# Patient Record
Sex: FEMALE | Race: WHITE | NOT HISPANIC OR LATINO | Employment: OTHER | URBAN - METROPOLITAN AREA
[De-identification: names, ages, dates, MRNs, and addresses within clinical notes are randomized per-mention and may not be internally consistent; named-entity substitution may affect disease eponyms.]

---

## 2019-01-11 ENCOUNTER — HOSPITAL ENCOUNTER (OUTPATIENT)
Dept: RADIOLOGY | Facility: MEDICAL CENTER | Age: 59
End: 2019-01-11

## 2019-01-23 ENCOUNTER — HOSPITAL ENCOUNTER (OUTPATIENT)
Dept: RADIOLOGY | Facility: MEDICAL CENTER | Age: 59
End: 2019-01-23
Attending: FAMILY MEDICINE
Payer: COMMERCIAL

## 2019-01-23 DIAGNOSIS — R92.8 ABNORMAL FINDING ON BREAST IMAGING: ICD-10-CM

## 2019-01-23 LAB — PATHOLOGY CONSULT NOTE: NORMAL

## 2019-01-23 PROCEDURE — 88305 TISSUE EXAM BY PATHOLOGIST: CPT

## 2019-01-23 PROCEDURE — 19083 BX BREAST 1ST LESION US IMAG: CPT

## 2019-01-23 PROCEDURE — 88360 TUMOR IMMUNOHISTOCHEM/MANUAL: CPT

## 2019-01-24 ENCOUNTER — TELEPHONE (OUTPATIENT)
Dept: RADIOLOGY | Facility: MEDICAL CENTER | Age: 59
End: 2019-01-24

## 2019-01-28 ENCOUNTER — HOSPITAL ENCOUNTER (OUTPATIENT)
Dept: RADIOLOGY | Facility: MEDICAL CENTER | Age: 59
End: 2019-01-28

## 2019-01-29 ENCOUNTER — TELEPHONE (OUTPATIENT)
Dept: HEMATOLOGY ONCOLOGY | Facility: MEDICAL CENTER | Age: 59
End: 2019-01-29

## 2019-01-29 ENCOUNTER — TELEPHONE (OUTPATIENT)
Dept: RADIOLOGY | Facility: MEDICAL CENTER | Age: 59
End: 2019-01-29

## 2019-01-29 NOTE — TELEPHONE ENCOUNTER
1st attempt to contact the patient- Left voicemail for patient requesting a return call to schedule New Oncology Genetic appointment. (Remind patient to bring a list/ or questionnaire, of her cancer related family history)  NP/[facility]/ Breast Ca/ Jorgito Marmolejo

## 2019-01-30 ENCOUNTER — PATIENT OUTREACH (OUTPATIENT)
Dept: OTHER | Facility: MEDICAL CENTER | Age: 59
End: 2019-01-30

## 2019-01-30 NOTE — LETTER
[facility]   [address]  [place] [address]  Phone: [phone] - Fax: [phone]              Address:  [place] [address]     Date: 01/30/19  Medical Record Number: [id number]    Dear Melanie,      It was nice talking with you today. As we discussed, I am your Cancer Nurse Navigator, a certified oncology nurse. My role is to assess any needs you may have and help with education, guidance and support. I am available to you and your family from diagnosis through your survivorship.       I am available to address your needs during your journey with the following services:     Care Coordination  I can assist you in facilitating communication between your cancer care treatment team to ensure timely treatment and follow-up.  I can also assist with transition of care back to your primary care provider, or other specialist, as needed.  My goal is to bridge gaps for you throughout the course of your active treatment.       Education Services  Understanding the recommended treatment course by your physician is key. I can provide educational resources personalized to your cancer diagnosis to help you understand your diagnosis and treatment. Please let me know if you would like to receive information about your diagnosis and treatment plan.  I am here to help.     Support Services/Resource Information  [facility] we offer a full scope of support services.  I can assist you with referral information to:  · Cancer Clinical Trials & Research  · Nutrition counseling  · Support groups  · Complementary Therapies such as Healing Touch and Mind-Body Techniques Meditation  · Patient Financial Advocates  ·   · [facility], an [employer] affiliate office, our volunteers can assist you with accessing our App Annieing library, support services information, head coverings and comfort items  · Community and national resources,  included eligibility based annamaria assistance and pharmaceutical access programs, if you are in need of additional information.     [facility] offers services that include:  · Behavioral Health  · Genetic counseling & testing  · Acupuncture  · Lymphedema prevention/treatment program  · Palliative care services.       Our care team includes a Survivorship Nurse Navigator, who meets with you after your treatment is completed to review your survivorship care plan and treatment summary.      I hope you have an excellent patient experience.  Please feel free to share with me your comments regarding the care you have received- we value your feedback.    Sincerely,     Hiwot Patiño RN, OCN, [facility]  Cancer Nurse Navigator    Office: [phone]   Direct line: [phone]  Email:  [email or web address]

## 2019-01-31 NOTE — PROGRESS NOTES
"Oncology nurse navigator telephone outreach.   DX: left breast invasive ductal carcinoma   POC: pending. She states she had a surgical consult with Dr. Marmolejo on 1/28/19 and will have an appointment for genetic counseling on 2/4/19.   BARRIERS ASSESSMENT:  TRANSPORTATION: Pt lives in [place]. She states she has a \"good car\" but it is a 5 hour drive each way. She states she can get chemo if needed close to home but is concerned about managing radiation therapy if needed.  FINANCIAL: pt denied barrier at this time  INSURANCE: [employer]   SUPPORT SYSTEM: her , adult children. She states her  and her son will be coming to support her during her appointments.    PSYCHOLOGICAL: pt states she is feeling overwhelmed and scared. She is anxious to find out what the plan of care will ultimately be.   COMMUNICATION: no barrier noted   FAMILY CARE: a new grandchild. Pt states she does not have care responsibilities.  Pt is retired .            INTERVENTION:  Introduced self and role of oncology nurse navigator. Support & services offered. Invited patient to Newly Diagnosed Breast Cancer Orientation Class. Explained how to sign up for classes virtually.      Arranged to meet pt when she is in [place] for her genetics appointment.      Mailed [facility] patient resource guide along with newly diagnosed breast cancer orientation class flyer and support services calendar. Pt will review the information and let me know if interested in any of the programs.    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 After speaking to pt, the ONN called Dr. Rothman's office and informed his MAKalpana.     Provided with ONN contact information and encouraged to call with needs or questions.  Will continue to follow as needed.  "

## 2019-02-01 NOTE — PROGRESS NOTES
"Prechart clinical data and referral information reviewed 2/1  Counseling and testing information prepared     \"I spent 30 minutes 2/1 from 0800 to 0830  reviewing past records, prior to  visit.\"          "

## 2019-02-04 ENCOUNTER — NON-PROVIDER VISIT (OUTPATIENT)
Dept: HEMATOLOGY ONCOLOGY | Facility: MEDICAL CENTER | Age: 59
End: 2019-02-04
Payer: COMMERCIAL

## 2019-02-04 ENCOUNTER — PATIENT OUTREACH (OUTPATIENT)
Dept: OTHER | Facility: MEDICAL CENTER | Age: 59
End: 2019-02-04

## 2019-02-04 ENCOUNTER — OFFICE VISIT (OUTPATIENT)
Dept: HEMATOLOGY ONCOLOGY | Facility: MEDICAL CENTER | Age: 59
End: 2019-02-04
Payer: COMMERCIAL

## 2019-02-04 VITALS
BODY MASS INDEX: 37.73 KG/M2 | HEART RATE: 93 BPM | HEIGHT: 64 IN | SYSTOLIC BLOOD PRESSURE: 122 MMHG | RESPIRATION RATE: 16 BRPM | OXYGEN SATURATION: 94 % | TEMPERATURE: 98.6 F | WEIGHT: 221 LBS | DIASTOLIC BLOOD PRESSURE: 84 MMHG

## 2019-02-04 VITALS
HEART RATE: 93 BPM | RESPIRATION RATE: 16 BRPM | HEIGHT: 64 IN | OXYGEN SATURATION: 94 % | TEMPERATURE: 98.6 F | WEIGHT: 221.12 LBS | BODY MASS INDEX: 37.75 KG/M2 | SYSTOLIC BLOOD PRESSURE: 122 MMHG | DIASTOLIC BLOOD PRESSURE: 84 MMHG

## 2019-02-04 DIAGNOSIS — C50.412 MALIGNANT NEOPLASM OF UPPER-OUTER QUADRANT OF LEFT FEMALE BREAST, UNSPECIFIED ESTROGEN RECEPTOR STATUS (HCC): ICD-10-CM

## 2019-02-04 DIAGNOSIS — Z80.43 FAMILY HISTORY OF TESTICULAR CANCER: ICD-10-CM

## 2019-02-04 DIAGNOSIS — Z80.0 FAMILY HISTORY OF MALIGNANT NEOPLASM OF GASTROINTESTINAL TRACT: ICD-10-CM

## 2019-02-04 DIAGNOSIS — Z15.01 BREAST CANCER GENETIC SUSCEPTIBILITY: ICD-10-CM

## 2019-02-04 DIAGNOSIS — Z80.0 FAMILY HISTORY OF COLON CANCER: ICD-10-CM

## 2019-02-04 DIAGNOSIS — Z80.3 FAMILY HISTORY OF MALIGNANT NEOPLASM OF BREAST: ICD-10-CM

## 2019-02-04 DIAGNOSIS — Z15.01 GENETIC SUSCEPTIBILITY TO MALIGNANT NEOPLASM OF BREAST: ICD-10-CM

## 2019-02-04 DIAGNOSIS — Z80.3 FAMILY HISTORY OF BREAST CANCER: ICD-10-CM

## 2019-02-04 DIAGNOSIS — Z80.43 FAMILY HISTORY OF MALIGNANT NEOPLASM OF TESTIS: ICD-10-CM

## 2019-02-04 PROCEDURE — 99000 SPECIMEN HANDLING OFFICE-LAB: CPT | Performed by: MEDICAL GENETICS

## 2019-02-04 PROCEDURE — 36415 COLL VENOUS BLD VENIPUNCTURE: CPT | Performed by: MEDICAL GENETICS

## 2019-02-04 PROCEDURE — 99358 PROLONG SERVICE W/O CONTACT: CPT | Performed by: MEDICAL GENETICS

## 2019-02-04 PROCEDURE — 99243 OFF/OP CNSLTJ NEW/EST LOW 30: CPT | Mod: 25 | Performed by: MEDICAL GENETICS

## 2019-02-04 RX ORDER — SPIRONOLACTONE 25 MG/1
25 TABLET ORAL EVERY MORNING
COMMUNITY
End: 2020-07-13

## 2019-02-04 ASSESSMENT — PAIN SCALES - GENERAL
PAINLEVEL: NO PAIN
PAINLEVEL: NO PAIN

## 2019-02-04 NOTE — PROGRESS NOTES
Met with pt, her  and her son after Dr. Owen consult. Pt states she has a lot of anxiety about what her treatment plan will be. She has had close friends and family members who were treated for breast cancer and is anxious to get started with some sort of treatment. Currently she states she is waiting for result of genetic test to determine extent of surgery. Pt lives in [place] and states she has reliable transportation but may need to stay in [place] periodically. Provided pt with lodging form. Pt states she has strong emotional support from family and friends. Discussed mind body techniques classes, healing touch, [employer] classes. Provided pt with list of rural counseling resources. Provided pt with list of coping tips and caregiver resources. Brought pt to Resource center and introduced her to staff, showed her what resources are available there.

## 2019-02-04 NOTE — NON-PROVIDER
Melanie Leyva is a 58 y.o. female here for a non-provider visit for: Lab Draws  on 2/4/2019 at 12:35 PM    Procedure Performed: Venipuncture     Anatomical site: Right Antecubital Area (AC)    Equipment used: 21g    Labs drawn: [place]ay    Ordering Provider: Dr. Jorgito Owen    Burt By: Liz Britt, Med Ass't   No complications.  was present the entire time the patients labs were bring drawn.

## 2019-02-04 NOTE — PROGRESS NOTES
GENETIC RISK ASSESSMENT    Melanie Leyva is a 58 y.o. year old patient who was referred by Jatin for cancer genetic risk assessment.    Chief Complaint:   Chief Complaint   Patient presents with   • New Patient      Breast Ca/ Jorgito Marmolejo       HPI: The patient was well until 1 month ago at which time a suspicious physical exam caused the patient to be referred for imaging). A suspicious (mammogram study identified a (left) breast mass. A biopsy  was performed and a specimen was submitted to pathology.     A diagnosis of  carcinoma was made.   The patient's family history is positive for multiple family members (5) with breast cancer (3 < age 50) and testicular and colon cancer  Review of personal and family histories suggested that a cancer genetic risk assessment was in order.    Review of Systems (ROS):  Constitutional normla   Eyes normal   ENT normal   Respiratory  Normal   Cardiovascular normal   Gastrointestinal normal   Genitourinary normal   Musculoskeletal normal   Skin normal   Neurological normla   Endocrine normal   Psychiatric normla  Hematologic/lymphatic normal   Allergic/Immunologic iodin3  All other systems reviewed and are negative.    History (Past/Family)  Family History:   No family history on file.   3 generation pedigree built   Yes   [place] empiric risk calculation No   Lyon II empiric risk calculation  No    Social History:       Social History     Social History   • Marital status:      Spouse name: N/A   • Number of children: N/A   • Years of education: N/A     Social History Main Topics   • Smoking status: Not on file   • Smokeless tobacco: Not on file   • Alcohol use Not on file   • Drug use: Unknown   • Sexual activity: Not on file     Other Topics Concern   • Not on file     Social History Narrative   • No narrative on file       Patient Past History:  No past medical history on file.        NCCN Testing Criteria Met                            Yes    Physical  Exam  Constitutional    There were no vitals taken for this visit.        General appearance: normal   Head Circumference:   ([place])  Eyes    Conjunctiva: normal   Pupils: reactive     ENMT    External inspection: normal    Assessment of Hearing: normal    Lips/cheeks/gums: no lesions  Neck   External exam: normal    Thyroid: no masses  Respiratory   Auscultation: clear    Cardiovascular   Auscultation: RRR    Edema : none  Chest   Breasts (exam): not done   Palpation:   GI   External exam and palpation: normal      Pelvic (female): not done   External exam (male):   Lymphatic   Palpation in 2 areas: normal     Musculoskeletal   Gait: normal    Digits and Nails: no lesions  Skin   Inspection: normal    Palpation: no masses                  Fibrofolliculoma: absent                  Trichodiscoma: absent                   Akrochordon: absent                   CAfe-au-lait:  absent                  Hypopigmentation: absent                  Mucosal freckling:  absent  Neurologic   Cranial nerves: intact   DTR’s: 2+       Assessment and Plan:  Patient with recent diagnosis of breast cancer and STRONG family history of breast, colon, testicular and lung cancer    40 Minutes (FACE TO FACE) >50% of time spent in Counseling and coordination of care discussing risks and benefits of DNA sequence evaluation    Ambry panel ordered

## 2019-02-13 ENCOUNTER — TELEPHONE (OUTPATIENT)
Dept: HEMATOLOGY ONCOLOGY | Facility: MEDICAL CENTER | Age: 59
End: 2019-02-13

## 2019-02-13 NOTE — TELEPHONE ENCOUNTER
Shani from [facility] requested Genetic testing results. Test performed on 02/04/19. Informed Shani takes up to a month to receive results back. Shani also pointed out that Dr. Marmolejo's name was spelled wrong on Dr. Owen's 02/04/2019 note. I pulled up the note and had Shani spell the last name for me . The spellings matched. Shani will go back into Epic and view  the spelling of the name again.

## 2019-03-12 ENCOUNTER — DOCUMENTATION (OUTPATIENT)
Dept: OTHER | Facility: MEDICAL CENTER | Age: 59
End: 2019-03-12

## 2019-03-12 NOTE — PROGRESS NOTES
Contacted Dr. Marmolejo's office to determine status of POC for breast cancer. Per BAUDILIO Acuna, nothing is currently scheduled, awaiting pt consult with plastic surgery. Kalpana states she will look into status of pt appointment with plastic surgery and will try to expedite combined surgery scheduling.

## 2019-03-19 ENCOUNTER — DOCUMENTATION (OUTPATIENT)
Dept: OTHER | Facility: MEDICAL CENTER | Age: 59
End: 2019-03-19

## 2019-03-19 ENCOUNTER — PREP FOR PROCEDURE (OUTPATIENT)
Dept: SCHEDULING | Facility: MEDICAL CENTER | Age: 59
End: 2019-03-19

## 2019-03-19 PROBLEM — C50.912 BREAST CANCER, LEFT BREAST (HCC): Status: ACTIVE | Noted: 2019-03-19

## 2019-03-26 ENCOUNTER — APPOINTMENT (OUTPATIENT)
Dept: ADMISSIONS | Facility: MEDICAL CENTER | Age: 59
End: 2019-03-26
Attending: SURGERY
Payer: COMMERCIAL

## 2019-03-26 RX ORDER — OMEPRAZOLE 40 MG/1
40 CAPSULE, DELAYED RELEASE ORAL EVERY MORNING
COMMUNITY
End: 2021-07-08

## 2019-03-26 RX ORDER — ATORVASTATIN CALCIUM 40 MG/1
40 TABLET, FILM COATED ORAL NIGHTLY
COMMUNITY

## 2019-03-26 RX ORDER — DILTIAZEM HYDROCHLORIDE 240 MG/1
240 CAPSULE, COATED, EXTENDED RELEASE ORAL EVERY MORNING
COMMUNITY
End: 2021-07-08

## 2019-03-27 ENCOUNTER — PREP FOR PROCEDURE (OUTPATIENT)
Dept: SCHEDULING | Facility: MEDICAL CENTER | Age: 59
End: 2019-03-27

## 2019-04-01 ENCOUNTER — PATIENT OUTREACH (OUTPATIENT)
Dept: OTHER | Facility: MEDICAL CENTER | Age: 59
End: 2019-04-01

## 2019-04-01 NOTE — PROGRESS NOTES
Incoming call from pt. She stated she would like to minimize trips from [place] as much as possible, asked about coordinating med onc consult with surgical follow up appt on 4/10/19, and scheduled appt with plastic surgeon with immunology appt on 4/25/19. This ONN contacted Kalpana at Dr. Marmolejo's office regarding med onc referral, she stated she will check with MD and call back. This ONN contacted Kathy at Dr. Fuentes's office and was able to arrange appt which did not conflict with immunology appt on 4/25. Notified pt.

## 2019-04-02 ENCOUNTER — ANESTHESIA EVENT (OUTPATIENT)
Dept: SURGERY | Facility: MEDICAL CENTER | Age: 59
End: 2019-04-02
Payer: COMMERCIAL

## 2019-04-02 ENCOUNTER — ANESTHESIA (OUTPATIENT)
Dept: SURGERY | Facility: MEDICAL CENTER | Age: 59
End: 2019-04-02
Payer: COMMERCIAL

## 2019-04-02 ENCOUNTER — HOSPITAL ENCOUNTER (OUTPATIENT)
Facility: MEDICAL CENTER | Age: 59
End: 2019-04-03
Attending: SURGERY | Admitting: SURGERY
Payer: COMMERCIAL

## 2019-04-02 ENCOUNTER — APPOINTMENT (OUTPATIENT)
Dept: RADIOLOGY | Facility: MEDICAL CENTER | Age: 59
End: 2019-04-02
Attending: SURGERY
Payer: COMMERCIAL

## 2019-04-02 DIAGNOSIS — C50.512 MALIGNANT NEOPLASM OF LOWER-OUTER QUADRANT OF LEFT FEMALE BREAST, UNSPECIFIED ESTROGEN RECEPTOR STATUS (HCC): ICD-10-CM

## 2019-04-02 PROBLEM — K21.9 GERD (GASTROESOPHAGEAL REFLUX DISEASE): Status: ACTIVE | Noted: 2019-04-02

## 2019-04-02 PROBLEM — I10 HYPERTENSION: Status: ACTIVE | Noted: 2019-04-02

## 2019-04-02 PROCEDURE — A6402 STERILE GAUZE <= 16 SQ IN: HCPCS | Performed by: SURGERY

## 2019-04-02 PROCEDURE — 160009 HCHG ANES TIME/MIN: Performed by: SURGERY

## 2019-04-02 PROCEDURE — 88307 TISSUE EXAM BY PATHOLOGIST: CPT | Mod: 59

## 2019-04-02 PROCEDURE — 500389 HCHG DRAIN, RESERVOIR SUCT JP 100CC: Performed by: SURGERY

## 2019-04-02 PROCEDURE — 160035 HCHG PACU - 1ST 60 MINS PHASE I: Performed by: SURGERY

## 2019-04-02 PROCEDURE — 700102 HCHG RX REV CODE 250 W/ 637 OVERRIDE(OP)

## 2019-04-02 PROCEDURE — 700105 HCHG RX REV CODE 258: Performed by: ANESTHESIOLOGY

## 2019-04-02 PROCEDURE — 160029 HCHG SURGERY MINUTES - 1ST 30 MINS LEVEL 4: Performed by: SURGERY

## 2019-04-02 PROCEDURE — 700101 HCHG RX REV CODE 250: Performed by: ANESTHESIOLOGY

## 2019-04-02 PROCEDURE — A9541 TC99M SULFUR COLLOID: HCPCS | Mod: LT

## 2019-04-02 PROCEDURE — 700111 HCHG RX REV CODE 636 W/ 250 OVERRIDE (IP)

## 2019-04-02 PROCEDURE — 700102 HCHG RX REV CODE 250 W/ 637 OVERRIDE(OP): Performed by: ANESTHESIOLOGY

## 2019-04-02 PROCEDURE — 700111 HCHG RX REV CODE 636 W/ 250 OVERRIDE (IP): Performed by: SURGERY

## 2019-04-02 PROCEDURE — 700101 HCHG RX REV CODE 250

## 2019-04-02 PROCEDURE — 96374 THER/PROPH/DIAG INJ IV PUSH: CPT

## 2019-04-02 PROCEDURE — 501838 HCHG SUTURE GENERAL: Performed by: SURGERY

## 2019-04-02 PROCEDURE — 700111 HCHG RX REV CODE 636 W/ 250 OVERRIDE (IP): Performed by: ANESTHESIOLOGY

## 2019-04-02 PROCEDURE — G0378 HOSPITAL OBSERVATION PER HR: HCPCS

## 2019-04-02 PROCEDURE — 502594 HCHG SCISSOR HANDLE, HARMONIC ACE: Performed by: SURGERY

## 2019-04-02 PROCEDURE — 160041 HCHG SURGERY MINUTES - EA ADDL 1 MIN LEVEL 4: Performed by: SURGERY

## 2019-04-02 PROCEDURE — 160048 HCHG OR STATISTICAL LEVEL 1-5: Performed by: SURGERY

## 2019-04-02 PROCEDURE — 700101 HCHG RX REV CODE 250: Performed by: SURGERY

## 2019-04-02 PROCEDURE — A9270 NON-COVERED ITEM OR SERVICE: HCPCS

## 2019-04-02 PROCEDURE — 88342 IMHCHEM/IMCYTCHM 1ST ANTB: CPT

## 2019-04-02 PROCEDURE — A9270 NON-COVERED ITEM OR SERVICE: HCPCS | Performed by: SURGERY

## 2019-04-02 PROCEDURE — 700102 HCHG RX REV CODE 250 W/ 637 OVERRIDE(OP): Performed by: SURGERY

## 2019-04-02 PROCEDURE — 160002 HCHG RECOVERY MINUTES (STAT): Performed by: SURGERY

## 2019-04-02 PROCEDURE — 500371 HCHG DRAIN, BLAKE 10MM: Performed by: SURGERY

## 2019-04-02 PROCEDURE — 502588 HCHG PACK, MINOR: Performed by: SURGERY

## 2019-04-02 PROCEDURE — 501445 HCHG STAPLER, SKIN DISP: Performed by: SURGERY

## 2019-04-02 PROCEDURE — 88331 PATH CONSLTJ SURG 1 BLK 1SPC: CPT | Mod: 91

## 2019-04-02 PROCEDURE — A9270 NON-COVERED ITEM OR SERVICE: HCPCS | Performed by: ANESTHESIOLOGY

## 2019-04-02 RX ORDER — OMEPRAZOLE 20 MG/1
20 CAPSULE, DELAYED RELEASE ORAL DAILY
Status: DISCONTINUED | OUTPATIENT
Start: 2019-04-03 | End: 2019-04-03 | Stop reason: HOSPADM

## 2019-04-02 RX ORDER — HYDROMORPHONE HYDROCHLORIDE 1 MG/ML
0.4 INJECTION, SOLUTION INTRAMUSCULAR; INTRAVENOUS; SUBCUTANEOUS
Status: DISCONTINUED | OUTPATIENT
Start: 2019-04-02 | End: 2019-04-02 | Stop reason: HOSPADM

## 2019-04-02 RX ORDER — OXYCODONE HCL 5 MG/5 ML
10 SOLUTION, ORAL ORAL
Status: COMPLETED | OUTPATIENT
Start: 2019-04-02 | End: 2019-04-02

## 2019-04-02 RX ORDER — ALPRAZOLAM 0.25 MG/1
TABLET ORAL
Status: COMPLETED
Start: 2019-04-02 | End: 2019-04-02

## 2019-04-02 RX ORDER — DILTIAZEM HYDROCHLORIDE 240 MG/1
240 CAPSULE, COATED, EXTENDED RELEASE ORAL
Status: DISCONTINUED | OUTPATIENT
Start: 2019-04-03 | End: 2019-04-03 | Stop reason: HOSPADM

## 2019-04-02 RX ORDER — MEPERIDINE HYDROCHLORIDE 25 MG/ML
12.5 INJECTION INTRAMUSCULAR; INTRAVENOUS; SUBCUTANEOUS
Status: DISCONTINUED | OUTPATIENT
Start: 2019-04-02 | End: 2019-04-02 | Stop reason: HOSPADM

## 2019-04-02 RX ORDER — DIPHENHYDRAMINE HYDROCHLORIDE 50 MG/ML
12.5 INJECTION INTRAMUSCULAR; INTRAVENOUS
Status: DISCONTINUED | OUTPATIENT
Start: 2019-04-02 | End: 2019-04-02 | Stop reason: HOSPADM

## 2019-04-02 RX ORDER — SODIUM CHLORIDE AND POTASSIUM CHLORIDE 150; 450 MG/100ML; MG/100ML
INJECTION, SOLUTION INTRAVENOUS CONTINUOUS
Status: DISCONTINUED | OUTPATIENT
Start: 2019-04-02 | End: 2019-04-03 | Stop reason: HOSPADM

## 2019-04-02 RX ORDER — LIDOCAINE AND PRILOCAINE 25; 25 MG/G; MG/G
CREAM TOPICAL
Status: COMPLETED
Start: 2019-04-02 | End: 2019-04-02

## 2019-04-02 RX ORDER — OXYCODONE HCL 5 MG/5 ML
SOLUTION, ORAL ORAL
Status: COMPLETED
Start: 2019-04-02 | End: 2019-04-02

## 2019-04-02 RX ORDER — ISOSULFAN BLUE 50 MG/5ML
INJECTION, SOLUTION SUBCUTANEOUS
Status: DISPENSED
Start: 2019-04-02 | End: 2019-04-03

## 2019-04-02 RX ORDER — KETOROLAC TROMETHAMINE 30 MG/ML
INJECTION, SOLUTION INTRAMUSCULAR; INTRAVENOUS PRN
Status: DISCONTINUED | OUTPATIENT
Start: 2019-04-02 | End: 2019-04-02 | Stop reason: SURG

## 2019-04-02 RX ORDER — OXYCODONE HYDROCHLORIDE AND ACETAMINOPHEN 5; 325 MG/1; MG/1
1-2 TABLET ORAL EVERY 4 HOURS PRN
Status: DISCONTINUED | OUTPATIENT
Start: 2019-04-02 | End: 2019-04-03 | Stop reason: HOSPADM

## 2019-04-02 RX ORDER — HYDROMORPHONE HYDROCHLORIDE 1 MG/ML
0.2 INJECTION, SOLUTION INTRAMUSCULAR; INTRAVENOUS; SUBCUTANEOUS
Status: DISCONTINUED | OUTPATIENT
Start: 2019-04-02 | End: 2019-04-02 | Stop reason: HOSPADM

## 2019-04-02 RX ORDER — SODIUM CHLORIDE, SODIUM LACTATE, POTASSIUM CHLORIDE, CALCIUM CHLORIDE 600; 310; 30; 20 MG/100ML; MG/100ML; MG/100ML; MG/100ML
INJECTION, SOLUTION INTRAVENOUS
Status: DISCONTINUED | OUTPATIENT
Start: 2019-04-02 | End: 2019-04-02 | Stop reason: SURG

## 2019-04-02 RX ORDER — MORPHINE SULFATE 4 MG/ML
3 INJECTION, SOLUTION INTRAMUSCULAR; INTRAVENOUS
Status: DISCONTINUED | OUTPATIENT
Start: 2019-04-02 | End: 2019-04-03 | Stop reason: HOSPADM

## 2019-04-02 RX ORDER — HYDROMORPHONE HYDROCHLORIDE 1 MG/ML
0.1 INJECTION, SOLUTION INTRAMUSCULAR; INTRAVENOUS; SUBCUTANEOUS
Status: DISCONTINUED | OUTPATIENT
Start: 2019-04-02 | End: 2019-04-02 | Stop reason: HOSPADM

## 2019-04-02 RX ORDER — ONDANSETRON 2 MG/ML
4 INJECTION INTRAMUSCULAR; INTRAVENOUS EVERY 4 HOURS PRN
Status: DISCONTINUED | OUTPATIENT
Start: 2019-04-02 | End: 2019-04-03 | Stop reason: HOSPADM

## 2019-04-02 RX ORDER — OXYCODONE HCL 5 MG/5 ML
5 SOLUTION, ORAL ORAL
Status: COMPLETED | OUTPATIENT
Start: 2019-04-02 | End: 2019-04-02

## 2019-04-02 RX ORDER — SCOLOPAMINE TRANSDERMAL SYSTEM 1 MG/1
1 PATCH, EXTENDED RELEASE TRANSDERMAL
Status: DISCONTINUED | OUTPATIENT
Start: 2019-04-02 | End: 2019-04-03 | Stop reason: HOSPADM

## 2019-04-02 RX ORDER — CEFAZOLIN SODIUM 1 G/3ML
INJECTION, POWDER, FOR SOLUTION INTRAMUSCULAR; INTRAVENOUS PRN
Status: DISCONTINUED | OUTPATIENT
Start: 2019-04-02 | End: 2019-04-02 | Stop reason: SURG

## 2019-04-02 RX ORDER — SODIUM CHLORIDE, SODIUM LACTATE, POTASSIUM CHLORIDE, CALCIUM CHLORIDE 600; 310; 30; 20 MG/100ML; MG/100ML; MG/100ML; MG/100ML
INJECTION, SOLUTION INTRAVENOUS CONTINUOUS
Status: DISCONTINUED | OUTPATIENT
Start: 2019-04-02 | End: 2019-04-02 | Stop reason: HOSPADM

## 2019-04-02 RX ORDER — ISOSULFAN BLUE 50 MG/5ML
INJECTION, SOLUTION SUBCUTANEOUS
Status: DISCONTINUED | OUTPATIENT
Start: 2019-04-02 | End: 2019-04-02 | Stop reason: HOSPADM

## 2019-04-02 RX ORDER — ONDANSETRON 2 MG/ML
4 INJECTION INTRAMUSCULAR; INTRAVENOUS
Status: COMPLETED | OUTPATIENT
Start: 2019-04-02 | End: 2019-04-02

## 2019-04-02 RX ORDER — SODIUM CHLORIDE, SODIUM LACTATE, POTASSIUM CHLORIDE, CALCIUM CHLORIDE 600; 310; 30; 20 MG/100ML; MG/100ML; MG/100ML; MG/100ML
INJECTION, SOLUTION INTRAVENOUS CONTINUOUS
Status: ACTIVE | OUTPATIENT
Start: 2019-04-02 | End: 2019-04-03

## 2019-04-02 RX ORDER — HALOPERIDOL 5 MG/ML
1 INJECTION INTRAMUSCULAR
Status: DISCONTINUED | OUTPATIENT
Start: 2019-04-02 | End: 2019-04-02 | Stop reason: HOSPADM

## 2019-04-02 RX ORDER — DEXAMETHASONE SODIUM PHOSPHATE 4 MG/ML
INJECTION, SOLUTION INTRA-ARTICULAR; INTRALESIONAL; INTRAMUSCULAR; INTRAVENOUS; SOFT TISSUE PRN
Status: DISCONTINUED | OUTPATIENT
Start: 2019-04-02 | End: 2019-04-02 | Stop reason: SURG

## 2019-04-02 RX ORDER — ONDANSETRON 2 MG/ML
INJECTION INTRAMUSCULAR; INTRAVENOUS PRN
Status: DISCONTINUED | OUTPATIENT
Start: 2019-04-02 | End: 2019-04-02 | Stop reason: SURG

## 2019-04-02 RX ADMIN — ONDANSETRON 4 MG: 2 INJECTION INTRAMUSCULAR; INTRAVENOUS at 18:28

## 2019-04-02 RX ADMIN — OXYCODONE HYDROCHLORIDE 5 MG: 5 SOLUTION ORAL at 19:03

## 2019-04-02 RX ADMIN — SCOPALAMINE 1 PATCH: 1 PATCH, EXTENDED RELEASE TRANSDERMAL at 21:36

## 2019-04-02 RX ADMIN — CEFAZOLIN 2 G: 330 INJECTION, POWDER, FOR SOLUTION INTRAMUSCULAR; INTRAVENOUS at 14:38

## 2019-04-02 RX ADMIN — FENTANYL CITRATE 50 MCG: 50 INJECTION, SOLUTION INTRAMUSCULAR; INTRAVENOUS at 15:10

## 2019-04-02 RX ADMIN — KETOROLAC TROMETHAMINE 30 MG: 30 INJECTION, SOLUTION INTRAMUSCULAR at 17:35

## 2019-04-02 RX ADMIN — FENTANYL CITRATE 50 MCG: 50 INJECTION, SOLUTION INTRAMUSCULAR; INTRAVENOUS at 17:20

## 2019-04-02 RX ADMIN — PROPOFOL 200 MG: 10 INJECTION, EMULSION INTRAVENOUS at 14:40

## 2019-04-02 RX ADMIN — ALPRAZOLAM 0.25 MG: 0.25 TABLET ORAL at 11:03

## 2019-04-02 RX ADMIN — MORPHINE SULFATE 3 MG: 4 INJECTION INTRAVENOUS at 21:33

## 2019-04-02 RX ADMIN — FENTANYL CITRATE 50 MCG: 50 INJECTION, SOLUTION INTRAMUSCULAR; INTRAVENOUS at 16:20

## 2019-04-02 RX ADMIN — LIDOCAINE AND PRILOCAINE 1 APPLICATION: 25; 25 CREAM TOPICAL at 11:02

## 2019-04-02 RX ADMIN — LIDOCAINE HYDROCHLORIDE 100 MG: 20 INJECTION, SOLUTION INTRAVENOUS at 14:40

## 2019-04-02 RX ADMIN — Medication 5 MG: at 19:03

## 2019-04-02 RX ADMIN — SODIUM CHLORIDE, SODIUM LACTATE, POTASSIUM CHLORIDE, CALCIUM CHLORIDE: 600; 310; 30; 20 INJECTION, SOLUTION INTRAVENOUS at 10:55

## 2019-04-02 RX ADMIN — OXYCODONE AND ACETAMINOPHEN 2 TABLET: 5; 325 TABLET ORAL at 23:05

## 2019-04-02 RX ADMIN — SODIUM CHLORIDE, POTASSIUM CHLORIDE, SODIUM LACTATE AND CALCIUM CHLORIDE: 600; 310; 30; 20 INJECTION, SOLUTION INTRAVENOUS at 14:40

## 2019-04-02 RX ADMIN — POTASSIUM CHLORIDE AND SODIUM CHLORIDE: 450; 150 INJECTION, SOLUTION INTRAVENOUS at 21:36

## 2019-04-02 RX ADMIN — DEXAMETHASONE SODIUM PHOSPHATE 8 MG: 4 INJECTION, SOLUTION INTRAMUSCULAR; INTRAVENOUS at 14:40

## 2019-04-02 RX ADMIN — FENTANYL CITRATE 50 MCG: 50 INJECTION, SOLUTION INTRAMUSCULAR; INTRAVENOUS at 16:50

## 2019-04-02 RX ADMIN — SODIUM CHLORIDE, POTASSIUM CHLORIDE, SODIUM LACTATE AND CALCIUM CHLORIDE: 600; 310; 30; 20 INJECTION, SOLUTION INTRAVENOUS at 16:30

## 2019-04-02 RX ADMIN — ONDANSETRON 4 MG: 2 INJECTION INTRAMUSCULAR; INTRAVENOUS at 17:35

## 2019-04-02 ASSESSMENT — COGNITIVE AND FUNCTIONAL STATUS - GENERAL
DRESSING REGULAR LOWER BODY CLOTHING: A LITTLE
MOVING TO AND FROM BED TO CHAIR: A LITTLE
MOBILITY SCORE: 18
TOILETING: A LITTLE
HELP NEEDED FOR BATHING: A LOT
MOVING FROM LYING ON BACK TO SITTING ON SIDE OF FLAT BED: A LITTLE
TURNING FROM BACK TO SIDE WHILE IN FLAT BAD: A LITTLE
DRESSING REGULAR UPPER BODY CLOTHING: A LOT
STANDING UP FROM CHAIR USING ARMS: A LITTLE
DAILY ACTIVITIY SCORE: 17
CLIMB 3 TO 5 STEPS WITH RAILING: A LITTLE
WALKING IN HOSPITAL ROOM: A LITTLE
PERSONAL GROOMING: A LITTLE
SUGGESTED CMS G CODE MODIFIER MOBILITY: CK
SUGGESTED CMS G CODE MODIFIER DAILY ACTIVITY: CK

## 2019-04-02 ASSESSMENT — PAIN SCALES - GENERAL: PAIN_LEVEL: 5

## 2019-04-02 ASSESSMENT — LIFESTYLE VARIABLES
EVER_SMOKED: NEVER
ALCOHOL_USE: NO

## 2019-04-02 ASSESSMENT — PATIENT HEALTH QUESTIONNAIRE - PHQ9
1. LITTLE INTEREST OR PLEASURE IN DOING THINGS: NOT AT ALL
SUM OF ALL RESPONSES TO PHQ9 QUESTIONS 1 AND 2: 0
2. FEELING DOWN, DEPRESSED, IRRITABLE, OR HOPELESS: NOT AT ALL

## 2019-04-02 NOTE — ANESTHESIA PREPROCEDURE EVALUATION
Relevant Problems   No relevant active problems       Physical Exam    Airway   Mallampati: II       Cardiovascular    Dental    Pulmonary    Abdominal   (+) obese     Neurological              Anesthesia Plan    ASA 3   ASA physical status 3 criteria: morbid obesity - BMI greater than or equal to 40    Plan - general       Airway plan will be LMA        Induction: intravenous    Postoperative Plan: Postoperative administration of opioids is intended.    Pertinent diagnostic labs and testing reviewed    Informed Consent:    Anesthetic plan and risks discussed with patient.

## 2019-04-02 NOTE — ANESTHESIA PROCEDURE NOTES
Airway  Date/Time: 4/2/2019 2:40 PM  Performed by: GANSERT, TOBEY B  Authorized by: GANSERT, TOBEY B     Location:  OR  Urgency:  Elective  Indications for Airway Management:  Anesthesia  Spontaneous Ventilation: absent    Sedation Level:  Deep  Preoxygenated: Yes    Final Airway Type:  Supraglottic airway  Final Supraglottic Airway:  Standard LMA  SGA Size:  4  Number of Attempts at Approach:  1

## 2019-04-03 VITALS
RESPIRATION RATE: 18 BRPM | TEMPERATURE: 98.3 F | OXYGEN SATURATION: 92 % | BODY MASS INDEX: 39.49 KG/M2 | WEIGHT: 222.88 LBS | SYSTOLIC BLOOD PRESSURE: 101 MMHG | HEIGHT: 63 IN | DIASTOLIC BLOOD PRESSURE: 66 MMHG | HEART RATE: 94 BPM

## 2019-04-03 LAB
ANION GAP SERPL CALC-SCNC: 9 MMOL/L (ref 0–11.9)
BUN SERPL-MCNC: 16 MG/DL (ref 8–22)
CALCIUM SERPL-MCNC: 8.2 MG/DL (ref 8.5–10.5)
CHLORIDE SERPL-SCNC: 99 MMOL/L (ref 96–112)
CO2 SERPL-SCNC: 25 MMOL/L (ref 20–33)
CREAT SERPL-MCNC: 1.04 MG/DL (ref 0.5–1.4)
GLUCOSE SERPL-MCNC: 162 MG/DL (ref 65–99)
PATHOLOGY CONSULT NOTE: NORMAL
POTASSIUM SERPL-SCNC: 4.1 MMOL/L (ref 3.6–5.5)
SODIUM SERPL-SCNC: 133 MMOL/L (ref 135–145)

## 2019-04-03 PROCEDURE — 700101 HCHG RX REV CODE 250: Performed by: SURGERY

## 2019-04-03 PROCEDURE — G0378 HOSPITAL OBSERVATION PER HR: HCPCS

## 2019-04-03 PROCEDURE — 700102 HCHG RX REV CODE 250 W/ 637 OVERRIDE(OP): Performed by: SURGERY

## 2019-04-03 PROCEDURE — 36415 COLL VENOUS BLD VENIPUNCTURE: CPT

## 2019-04-03 PROCEDURE — 80048 BASIC METABOLIC PNL TOTAL CA: CPT

## 2019-04-03 PROCEDURE — A9270 NON-COVERED ITEM OR SERVICE: HCPCS | Performed by: SURGERY

## 2019-04-03 RX ORDER — CEPHALEXIN 500 MG/1
500 CAPSULE ORAL 3 TIMES DAILY
Qty: 30 CAP | Refills: 0 | Status: ON HOLD | OUTPATIENT
Start: 2019-04-03 | End: 2019-04-16

## 2019-04-03 RX ORDER — OXYCODONE HYDROCHLORIDE AND ACETAMINOPHEN 5; 325 MG/1; MG/1
1-2 TABLET ORAL EVERY 4 HOURS PRN
Qty: 35 TAB | Refills: 0 | Status: SHIPPED | OUTPATIENT
Start: 2019-04-03 | End: 2019-04-09

## 2019-04-03 RX ORDER — CEPHALEXIN 500 MG/1
500 CAPSULE ORAL 3 TIMES DAILY
Qty: 30 CAP | Refills: 0 | Status: SHIPPED | OUTPATIENT
Start: 2019-04-03 | End: 2019-04-13

## 2019-04-03 RX ADMIN — OXYCODONE AND ACETAMINOPHEN 2 TABLET: 5; 325 TABLET ORAL at 03:12

## 2019-04-03 RX ADMIN — OMEPRAZOLE 20 MG: 20 CAPSULE, DELAYED RELEASE ORAL at 07:11

## 2019-04-03 RX ADMIN — OXYCODONE AND ACETAMINOPHEN 2 TABLET: 5; 325 TABLET ORAL at 07:11

## 2019-04-03 RX ADMIN — POTASSIUM CHLORIDE AND SODIUM CHLORIDE: 450; 150 INJECTION, SOLUTION INTRAVENOUS at 08:19

## 2019-04-03 ASSESSMENT — ENCOUNTER SYMPTOMS
VOMITING: 0
NAUSEA: 0

## 2019-04-03 NOTE — PROGRESS NOTES
Surgical Progress Note    Author: Jorgito Marmolejo Date & Time created: 4/3/2019   8:28 AM     Interval Events:  Pain adequately controlled.  Urine output not recorded, despite order.  Feels ready for discharge home.    Review of Systems   Gastrointestinal: Negative for nausea and vomiting.     Hemodynamics:  Temp (24hrs), Av.4 °C (97.6 °F), Min:36.2 °C (97.2 °F), Max:36.7 °C (98 °F)  Temperature: 36.7 °C (98 °F)  Pulse  Av.5  Min: 75  Max: 93   Blood Pressure: 108/65, NIBP: 142/70     Respiratory:    Respiration: 17, Pulse Oximetry: 92 %           Neuro:  GCS = 15       Fluids:    Intake/Output Summary (Last 24 hours) at 19 08  Last data filed at 19 0815   Gross per 24 hour   Intake             3130 ml   Output              391 ml   Net             2739 ml     Weight: 101.1 kg (222 lb 14.2 oz)  Current Diet Order   Procedures   • Diet Order Regular     Physical Exam   Constitutional: She is oriented to person, place, and time. She appears well-developed and well-nourished. No distress.   HENT:   Head: Normocephalic.   Eyes: No scleral icterus.   Cardiovascular: Normal rate, regular rhythm and normal heart sounds.    Pulmonary/Chest: Effort normal and breath sounds normal. No respiratory distress. She exhibits tenderness (at mastectomy sites).   ERON drains each recorded with 80 ml out post op   Abdominal: Soft. Bowel sounds are normal. There is no tenderness.   Neurological: She is alert and oriented to person, place, and time.   Skin: Skin is warm and dry.   Psychiatric: She has a normal mood and affect. Her behavior is normal.     Labs:  Recent Results (from the past 24 hour(s))   Histology Request    Collection Time: 19  7:54 AM   Result Value Ref Range    Pathology Request Sent to Histo      Medical Decision Making, by Problem:  Active Hospital Problems    Diagnosis   • Hypertension [I10]   • GERD (gastroesophageal reflux disease) [K21.9]   • Breast cancer, left breast (HCC)  [C50.912]     Added automatically from request for surgery [id number]       Plan:  Home today.  Patient and family to be taught drain care and recording prior to discharge home.    Quality Measures:  Quality-Core Measures   Reviewed items::  Medications reviewed  Carlin catheter::  No Carlin  DVT prophylaxis pharmacological::  Enoxaparin (Lovenox)  Ulcer Prophylaxis::  Yes      Discussed patient condition with RN and Patient

## 2019-04-03 NOTE — PROGRESS NOTES
Pt discharged to home via car with relative.  Pt is AO x 4  IV pulled out  Reviewed discharge instructions with the pt. Pt verbalized understanding  Prescriptions were given prior to surgery per MD and educated pt on drain care, pt demonstrated understanding.  Personal belongings with the pt upon leaving.

## 2019-04-03 NOTE — CARE PLAN
Problem: Communication  Goal: The ability to communicate needs accurately and effectively will improve  Outcome: PROGRESSING AS EXPECTED  Report given before transfer. Pt updated on plan of care. Pt verbalizes understanding. Pt encouraged to voice needs and concerns. Communication board in use. Call light within reach. Pt calls appropriately. Hourly rounding in place. Pt has no needs or concerns at this time.     Problem: Knowledge Deficit  Goal: Knowledge of disease process/condition, treatment plan, diagnostic tests, and medications will improve  Outcome: PROGRESSING AS EXPECTED  Reviewed POC including safety, mobility, pain management, medication administration, DVT prophylaxis, and discharge.

## 2019-04-03 NOTE — DISCHARGE INSTRUCTIONS
Discharge Instructions    Discharged to home by car with relative. Discharged via wheelchair, hospital escort: Yes.  Special equipment needed: Not Applicable    Be sure to schedule a follow-up appointment with your primary care doctor or any specialists as instructed.     Discharge Plan:   Diet Plan: Discussed  Activity Level: Discussed  Confirmed Follow up Appointment: Appointment Scheduled  Confirmed Symptoms Management: Discussed  Medication Reconciliation Updated: Yes  Influenza Vaccine Indication: Not indicated: Previously immunized this influenza season and > 8 years of age    I understand that a diet low in cholesterol, fat, and sodium is recommended for good health. Unless I have been given specific instructions below for another diet, I accept this instruction as my diet prescription.   Other diet: Regular    Special Instructions: None    · Is patient discharged on Warfarin / Coumadin?   Mastectomy With or Without Reconstruction  Care After  Please read the instructions outlined below and refer to this sheet in the next few weeks. These discharge instructions provide you with general information on caring for yourself after you leave the hospital. Your caregiver may also give you specific instructions. While your treatment has been planned according to the most current medical practices available, unavoidable complications occasionally occur. If you have any problems or questions after discharge, please call your caregiver.  POST-OPERATIVE EXERCISES  · Lie in bed with your arm at your side. Raise your arm straight up and back, as if reaching for the headboard.  · Lying in bed, clasp your hands behind your head and push your elbows into the mattress.  · Raise your shoulders and rotate them forward, down, and back in a circular motion to loosen your chest, shoulder, and upper back muscles.  · Lying with your elbow bent and your arm on the bed at a 90 degree angle to your body, rotate your shoulder forward and  "bring your forearm down toward your feet. Then bring your forearm back up.  · With your arm raised parallel to the floor, clench and unclench your fist.  · Standing with your palm flat against a wall, \"walk\" your fingers up the wall.  SEEK MEDICAL CARE IF:   · There is redness, swelling, or increasing pain in the wound.  · There is pus coming from the wound.  · There is drainage from a wound lasting longer than one day.  · An unexplained oral temperature above 102° F (38.9° C) develops.  · You notice a foul smell coming from the wound or dressing.  · There is a breaking open of a wound (edges not staying together) after the sutures have been removed.  · You develop dizzy episodes or fainting while standing.  · You develop persistent nausea or vomiting.  SEEK IMMEDIATE MEDICAL CARE IF:   · You develop a rash.  · You have difficulty breathing, or develop any reaction or side effects to medications given.  Document Released: 07/07/2006 Document Revised: 03/11/2013 Document Reviewed: 11/19/2008  ExitCare® Patient Information ©2014 [employer], [facility].      Bulb Drain Home Care  A bulb drain consists of a thin rubber tube and a soft, round bulb that creates a gentle suction. The rubber tube is placed in the area where you had surgery. A bulb is attached to the end of the tube that is outside the body. The bulb drain removes excess fluid that normally builds up in a surgical wound after surgery. The color and amount of fluid will vary. Immediately after surgery, the fluid is bright red and is a little thicker than water. It may gradually change to a yellow or pink color and become more thin and water-like. When the amount decreases to about 1 or 2 tbsp in 24 hours, your health care provider will usually remove it.  DAILY CARE  · Keep the bulb flat (compressed) at all times, except while emptying it. The flatness creates suction. You can flatten the bulb by squeezing it firmly in the middle and then closing the cap.  · Keep " sites where the tube enters the skin dry and covered with a bandage (dressing).  · Secure the tube 1-2 in (2.5-5.1 cm) below the insertion sites to keep it from pulling on your stitches. The tube is stitched in place and will not slip out.  · Secure the bulb as directed by your health care provider.  · For the first 3 days after surgery, there usually is more fluid in the bulb. Empty the bulb whenever it becomes half full because the bulb does not create enough suction if it is too full. The bulb could also overflow. Write down how much fluid you remove each time you empty your drain. Add up the amount removed in 24 hours.  · Empty the bulb at the same time every day once the amount of fluid decreases and you only need to empty it once a day. Write down the amounts and the 24-hour totals to give to your health care provider. This helps your health care provider know when the tubes can be removed.  EMPTYING THE BULB DRAIN  Before emptying the bulb, get a measuring cup, a piece of paper and a pen, and wash your hands.  · Gently run your fingers down the tube (stripping) to empty any drainage from the tubing into the bulb. This may need to be done several times a day to clear the tubing of clots and tissue.  · Open the bulb cap to release suction, which causes it to inflate. Do not touch the inside of the cap.  · Gently run your fingers down the tube (stripping) to empty any drainage from the tubing into the bulb.  · Hold the cap out of the way, and pour fluid into the measuring cup.    · Squeeze the bulb to provide suction.   · Replace the cap.    · Check the tape that holds the tube to your skin. If it is becoming loose, you can remove the loose piece of tape and apply a new one. Then, pin the bulb to your shirt.    · Write down the amount of fluid you emptied out. Write down the date and each time you emptied your bulb drain. (If there are 2 bulbs, note the amount of drainage from each bulb and keep the totals  separate. Your health care provider will want to know the total amounts for each drain and which tube is draining more.)    · Flush the fluid down the toilet and wash your hands.    · Call your health care provider once you have less than 2 tbsp of fluid collecting in the bulb drain every 24 hours.  If there is drainage around the tube site, change dressings and keep the area dry. Cleanse around tube with sterile saline and place dry gauze around site. This gauze should be changed when it is soiled. If it stays clean and unsoiled, it should still be changed daily.   SEEK MEDICAL CARE IF:  · Your drainage has a bad smell or is cloudy.    · You have a fever.    · Your drainage is increasing instead of decreasing.    · Your tube fell out.    · You have redness or swelling around the tube site.    · You have drainage from a surgical wound.    · Your bulb drain will not stay flat after you empty it.    MAKE SURE YOU:   · Understand these instructions.  · Will watch your condition.  · Will get help right away if you are not doing well or get worse.     This information is not intended to replace advice given to you by your health care provider. Make sure you discuss any questions you have with your health care provider.     Document Released: 12/15/2001 Document Revised: 01/08/2016 Document Reviewed: 05/22/2013  [employer] Patient Education ©2016 [employer]        Depression / Suicide Risk    As you are discharged from this [facility] facility, it is important to learn how to keep safe from harming yourself.    Recognize the warning signs:  · Abrupt changes in personality, positive or negative- including increase in energy   · Giving away possessions  · Change in eating patterns- significant weight changes-  positive or negative  · Change in sleeping patterns- unable to sleep or sleeping all the time   · Unwillingness or inability to communicate  · Depression  · Unusual sadness, discouragement and  loneliness  · Talk of wanting to die  · Neglect of personal appearance   · Rebelliousness- reckless behavior  · Withdrawal from people/activities they love  · Confusion- inability to concentrate     If you or a loved one observes any of these behaviors or has concerns about self-harm, here's what you can do:  · Talk about it- your feelings and reasons for harming yourself  · Remove any means that you might use to hurt yourself (examples: pills, rope, extension cords, firearm)  · Get professional help from the community (Mental Health, Substance Abuse, psychological counseling)  · Do not be alone:Call your Safe Contact- someone whom you trust who will be there for you.  · Call your local CRISIS HOTLINE 696-1696 or 379-539-4774  · Call your local Children's Mobile Crisis Response Team [place] (457) 525-1684 or www.NurseGrid  · Call the toll free National Suicide Prevention Hotlines   · National Suicide Prevention Lifeline 573-827-GGFY (9467)  · The [place] Hope Line Network 800-SUICIDE (910-4255)      ACTIVITY: Rest and take it easy for the first 24 hours.  A responsible adult is recommended to remain with you during that time.  It is normal to feel sleepy.  We encourage you to not do anything that requires balance, judgment or coordination.    MILD FLU-LIKE SYMPTOMS ARE NORMAL. YOU MAY EXPERIENCE GENERALIZED MUSCLE ACHES, THROAT IRRITATION, HEADACHE AND/OR SOME NAUSEA.    FOR 24 HOURS DO NOT:  Drive, operate machinery or run household appliances.  Drink beer or alcoholic beverages.   Make important decisions or sign legal documents.    SPECIAL INSTRUCTIONS:     DIET: To avoid nausea, slowly advance diet as tolerated, avoiding spicy or greasy foods for the first day.  Add more substantial food to your diet according to your physician's instructions.  Babies can be fed formula or breast milk as soon as they are hungry.  INCREASE FLUIDS AND FIBER TO AVOID CONSTIPATION.    SURGICAL DRESSING/BATHING:     REON  drain care when discharged home:   Record drain output from each drain at least two times per day.   Call [facility] ([phone]) for a nursing appointment to remove drain(s) when the output from either or both drains is less than or equal to 30cc per day for two consecutive days.   No showers or baths until both drains out.   Continue prescribed antibiotics until both drains out.   No lifting restrictionss.   Use over the counter laxative of choice if constipated.   Remove dressing(s) on 4/5/19.   Follow up with Dr. Marmolejo ([facility], [phone]) as previously appointed, or in 10-14 days.    FOLLOW-UP APPOINTMENT:  A follow-up appointment should be arranged with your doctor; call to schedule.    You should CALL YOUR PHYSICIAN if you develop:  Fever greater than 101 degrees F.  Pain not relieved by medication, or persistent nausea or vomiting.  Excessive bleeding (blood soaking through dressing) or unexpected drainage from the wound.  Extreme redness or swelling around the incision site, drainage of pus or foul smelling drainage.  Inability to urinate or empty your bladder within 8 hours.  Problems with breathing or chest pain.    You should call 911 if you develop problems with breathing or chest pain.  If you are unable to contact your doctor or surgical center, you should go to the nearest emergency room or urgent care center.    Physician's telephone #: Dr Marmolejo [phone]    If any questions arise, call your doctor.  If your doctor is not available, please feel free to call the Surgical Center at [phone].  The Center is open Monday through Friday from 7AM to 7PM.  You can also call the [email or web address] HOTLINE open 24 hours/day, 7 days/week and speak to a nurse at [phone], or toll free at [phone].    A registered nurse may call you a few days after your surgery to see how you are doing after your procedure.    MEDICATIONS: Resume taking daily medication.  Take  prescribed pain medication with food.  If no medication is prescribed, you may take non-aspirin pain medication if needed.  PAIN MEDICATION CAN BE VERY CONSTIPATING.  Take a stool softener or laxative such as senokot, pericolace, or milk of magnesia if needed.    Prescription given for percocet.      If your physician has prescribed pain medication that includes Acetaminophen (Tylenol), do not take additional Acetaminophen (Tylenol) while taking the prescribed medication.

## 2019-04-03 NOTE — CARE PLAN
Problem: Communication  Goal: The ability to communicate needs accurately and effectively will improve  Outcome: PROGRESSING AS EXPECTED    Intervention: Educate patient and significant other/support system about the plan of care, procedures, treatments, medications and allow for questions  Updated pt about POC, answered questions. Pt verbalized understanding.      Problem: Safety  Goal: Will remain free from falls  Outcome: PROGRESSING AS EXPECTED    Intervention: Implement fall precautions  Educated pt about use of call light when needing to get OOB. Pt verbalized understanding. Bed locked and in lowest position, pt wearing threaded footwear.

## 2019-04-03 NOTE — OR SURGEON
Immediate Post OP Note    PreOp Diagnosis: Left breast invasive ductal carcinoma    PostOp Diagnosis: Left breast invasive ductal carcinoma    Procedure(s):  1.  Right simple mastectomy  2.  Left mastectomy with left axillary sentinel lymph node biopsy      Wound class clean    Surgeon(s):  Jorgito Marmolejo M.D.    Anesthesiologist/Type of Anesthesia:  Anesthesiologist: Tobey Gansert, M.D./General    Surgical Staff:  Assistant: CADY Tyler  Circulator: Shad Friedman R.N.  Scrub Person: Sohail Yeung    Specimens removed if any:  ID Type Source Tests Collected by Time Destination   A : Right Breast , short stitch super, long stitch lateral Tissue Breast PATHOLOGY SPECIMEN Jorgito Marmolejo M.D. 4/2/2019  3:53 PM    B : Left Breast, short stitch superior, long stitch lateral Tissue Breast PATHOLOGY SPECIMEN Jorgito Marmolejo M.D. 4/2/2019  5:17 PM    C : [place] node # 1 Other Other PATHOLOGY SPECIMEN Jorigto Marmolejo M.D. 4/2/2019  5:16 PM    D :  sentinel node # 2 Other Other PATHOLOGY SPECIMEN Jorgito Marmolejo M.D. 4/2/2019  5:20 PM    E : sentinel node # 3 Other Other PATHOLOGY SPECIMEN Jorgito Marmolejo M.D. 4/2/2019  5:20 PM        Estimated Blood Loss: 100 ml    Findings: Obesity.  [place] lymph nodes 1-4 without evidence of metastatic disease by pathologist report to me in OR    Complications: None        4/2/2019 6:06 PM Jorgito Marmolejo M.D.

## 2019-04-03 NOTE — ANESTHESIA POSTPROCEDURE EVALUATION
Patient: Melanie Leyva    Procedure Summary     Date:  04/02/19 Room / Location:  [facility] ROOM 22 / SURGERY SAME DAY [facility]    Anesthesia Start:  1438 Anesthesia Stop:  1808    Procedures:       MASTECTOMY (Bilateral Breast)      BIOPSY, LYMPH NODE, SENTINEL- POSSIBLE COMPLETION (Left Breast) Diagnosis:       Breast cancer, left breast (HCC)      (Breast cancer, left breast (HCC) [[id number]])    Surgeon:  Jorgito Marmolejo M.D. Responsible Provider:  Tobey Gansert, M.D.    Anesthesia Type:  general ASA Status:  3          Final Anesthesia Type: general  Last vitals  BP   NIBP: 142/70    Temp   36.6 °C (97.8 °F)    Pulse   Pulse: 76   Resp   16    SpO2   93 %      Anesthesia Post Evaluation    Patient location during evaluation: PACU  Patient participation: complete - patient participated  Level of consciousness: awake and alert  Pain score: 5    Airway patency: patent  Anesthetic complications: no  Cardiovascular status: hemodynamically stable  Respiratory status: acceptable  Hydration status: euvolemic    PONV: none           Nurse Pain Score: 5 (NPRS)

## 2019-04-03 NOTE — PROGRESS NOTES
2 RN skin check complete with Deanna PIERCE.  SCDs in place   Skin assessed under devices  No confirmed pressure ulcers found   Surgical dressing to bilateral chest. Skin intact.

## 2019-04-03 NOTE — ANESTHESIA TIME REPORT
Anesthesia Start and Stop Event Times     Date Time Event    4/2/2019 1438 Anesthesia Start     1808 Anesthesia Stop        Responsible Staff  04/02/19    Name Role Begin End    Tobey Gansert, M.D. Anesth 1438 1808        Preop Diagnosis (Free Text):  Pre-op Diagnosis     Breast cancer, left breast (HCC) [[id number]]        Preop Diagnosis (Codes):  Diagnosis Information     Diagnosis Code(s): Breast cancer, left breast (HCC) [C50.912]        Post op Diagnosis  Breast cancer (HCC)      Premium Reason  A. 3PM - 7AM    Comments:

## 2019-04-03 NOTE — OR NURSING
1808  Pt arrived to PACU from OR asleep on 6L mask asleep.  VS stable.  Drsg C,D&I no drainage noted.    1822 Pt given Oxycodone for pain 5/10.  Taking PO, vs stable on 2L NC.   talking with Dr Marmolejo.      1824  , Chong brought to bs. Pt threw up oxycodone.      1828  zofran given for nausea.  Aromatherapy given to pt as well as ice pack applied to chest.    1835  Fentanyl 50mcg given for pain    1840  Dr Marmolejo talking with pt and  at bs    1850  PT oob to br, able to void.  Denies nausea now and pain tolerable    1900   Pt back in bed.  pt given 50mcg fentanyl    1903 Pt given 5mg oxycodone.  Taking ginger ale.      1918  VS stable on 2L NC.  Pt sleeping intermittently    1950  Report given to STAN Zaman on ortho floor.  Waiting for transport, pt meets PACU d/c criteria.    2010  Pt transported to room with  and son.  Her belongings went with her.

## 2019-04-05 ENCOUNTER — PATIENT OUTREACH (OUTPATIENT)
Dept: OTHER | Facility: MEDICAL CENTER | Age: 59
End: 2019-04-05

## 2019-04-05 NOTE — PROGRESS NOTES
Oncology Nurse Navigator telephone outreach.States did not receive camisole, she went and bought one. Pt states she has been in moderately high amounts of pain, she rated it 5/10. She stated she is taking her pain medications as directed but is not able to sleep. Denied fever, states drainage is slightly decreased. States she is resting with arms elevated on pillows has also been using bags of frozen peas as cold therapy.  Encouraged pt to call surgeon's office to report persistent pain. Pt agreed.  This ONN also called Kalpana at [facility] to report symptoms, LM.

## 2019-04-16 ENCOUNTER — ANESTHESIA (OUTPATIENT)
Dept: SURGERY | Facility: MEDICAL CENTER | Age: 59
DRG: 857 | End: 2019-04-16
Payer: COMMERCIAL

## 2019-04-16 ENCOUNTER — ANESTHESIA EVENT (OUTPATIENT)
Dept: SURGERY | Facility: MEDICAL CENTER | Age: 59
DRG: 857 | End: 2019-04-16
Payer: COMMERCIAL

## 2019-04-16 ENCOUNTER — HOSPITAL ENCOUNTER (INPATIENT)
Facility: MEDICAL CENTER | Age: 59
LOS: 6 days | DRG: 857 | End: 2019-04-22
Attending: SURGERY | Admitting: SURGERY
Payer: COMMERCIAL

## 2019-04-16 DIAGNOSIS — T81.49XA WOUND INFECTION AFTER SURGERY: ICD-10-CM

## 2019-04-16 LAB
ANION GAP SERPL CALC-SCNC: 9 MMOL/L (ref 0–11.9)
BASOPHILS # BLD AUTO: 0.4 % (ref 0–1.8)
BASOPHILS # BLD: 0.04 K/UL (ref 0–0.12)
BUN SERPL-MCNC: 19 MG/DL (ref 8–22)
CALCIUM SERPL-MCNC: 8.8 MG/DL (ref 8.5–10.5)
CHLORIDE SERPL-SCNC: 98 MMOL/L (ref 96–112)
CO2 SERPL-SCNC: 25 MMOL/L (ref 20–33)
CREAT SERPL-MCNC: 1.19 MG/DL (ref 0.5–1.4)
EOSINOPHIL # BLD AUTO: 0.16 K/UL (ref 0–0.51)
EOSINOPHIL NFR BLD: 1.5 % (ref 0–6.9)
ERYTHROCYTE [DISTWIDTH] IN BLOOD BY AUTOMATED COUNT: 47.2 FL (ref 35.9–50)
GLUCOSE SERPL-MCNC: 107 MG/DL (ref 65–99)
HCT VFR BLD AUTO: 35.6 % (ref 37–47)
HGB BLD-MCNC: 11.8 G/DL (ref 12–16)
IMM GRANULOCYTES # BLD AUTO: 0.06 K/UL (ref 0–0.11)
IMM GRANULOCYTES NFR BLD AUTO: 0.6 % (ref 0–0.9)
LYMPHOCYTES # BLD AUTO: 1.3 K/UL (ref 1–4.8)
LYMPHOCYTES NFR BLD: 12.1 % (ref 22–41)
MCH RBC QN AUTO: 29.3 PG (ref 27–33)
MCHC RBC AUTO-ENTMCNC: 33.1 G/DL (ref 33.6–35)
MCV RBC AUTO: 88.3 FL (ref 81.4–97.8)
MONOCYTES # BLD AUTO: 1.57 K/UL (ref 0–0.85)
MONOCYTES NFR BLD AUTO: 14.6 % (ref 0–13.4)
NEUTROPHILS # BLD AUTO: 7.64 K/UL (ref 2–7.15)
NEUTROPHILS NFR BLD: 70.8 % (ref 44–72)
NRBC # BLD AUTO: 0 K/UL
NRBC BLD-RTO: 0 /100 WBC
PLATELET # BLD AUTO: 431 K/UL (ref 164–446)
PMV BLD AUTO: 9.4 FL (ref 9–12.9)
POTASSIUM SERPL-SCNC: 5.6 MMOL/L (ref 3.6–5.5)
RBC # BLD AUTO: 4.03 M/UL (ref 4.2–5.4)
SODIUM SERPL-SCNC: 132 MMOL/L (ref 135–145)
WBC # BLD AUTO: 10.8 K/UL (ref 4.8–10.8)

## 2019-04-16 PROCEDURE — 80048 BASIC METABOLIC PNL TOTAL CA: CPT

## 2019-04-16 PROCEDURE — 160048 HCHG OR STATISTICAL LEVEL 1-5: Performed by: SURGERY

## 2019-04-16 PROCEDURE — 306543 TUBING,VASO CONTAINER 60IN VAC: Performed by: SURGERY

## 2019-04-16 PROCEDURE — 700111 HCHG RX REV CODE 636 W/ 250 OVERRIDE (IP): Performed by: ANESTHESIOLOGY

## 2019-04-16 PROCEDURE — 700111 HCHG RX REV CODE 636 W/ 250 OVERRIDE (IP)

## 2019-04-16 PROCEDURE — 700105 HCHG RX REV CODE 258: Performed by: SURGERY

## 2019-04-16 PROCEDURE — 501445 HCHG STAPLER, SKIN DISP: Performed by: SURGERY

## 2019-04-16 PROCEDURE — 87205 SMEAR GRAM STAIN: CPT

## 2019-04-16 PROCEDURE — 160002 HCHG RECOVERY MINUTES (STAT): Performed by: SURGERY

## 2019-04-16 PROCEDURE — 87186 SC STD MICRODIL/AGAR DIL: CPT | Mod: 91

## 2019-04-16 PROCEDURE — 0HBTXZZ EXCISION OF RIGHT BREAST, EXTERNAL APPROACH: ICD-10-PCS | Performed by: SURGERY

## 2019-04-16 PROCEDURE — 85025 COMPLETE CBC W/AUTO DIFF WBC: CPT

## 2019-04-16 PROCEDURE — 500440 HCHG DRESSING, STERILE ROLL (KERLIX): Performed by: SURGERY

## 2019-04-16 PROCEDURE — 160028 HCHG SURGERY MINUTES - 1ST 30 MINS LEVEL 3: Performed by: SURGERY

## 2019-04-16 PROCEDURE — 700105 HCHG RX REV CODE 258: Performed by: ANESTHESIOLOGY

## 2019-04-16 PROCEDURE — 87070 CULTURE OTHR SPECIMN AEROBIC: CPT

## 2019-04-16 PROCEDURE — 160035 HCHG PACU - 1ST 60 MINS PHASE I: Performed by: SURGERY

## 2019-04-16 PROCEDURE — 160039 HCHG SURGERY MINUTES - EA ADDL 1 MIN LEVEL 3: Performed by: SURGERY

## 2019-04-16 PROCEDURE — 160036 HCHG PACU - EA ADDL 30 MINS PHASE I: Performed by: SURGERY

## 2019-04-16 PROCEDURE — 500423 HCHG DRESSING, ABD COMBINE: Performed by: SURGERY

## 2019-04-16 PROCEDURE — 302129 PCA PLUS: Performed by: SURGERY

## 2019-04-16 PROCEDURE — 160009 HCHG ANES TIME/MIN: Performed by: SURGERY

## 2019-04-16 PROCEDURE — 770006 HCHG ROOM/CARE - MED/SURG/GYN SEMI*

## 2019-04-16 PROCEDURE — 700101 HCHG RX REV CODE 250: Performed by: SURGERY

## 2019-04-16 PROCEDURE — 700101 HCHG RX REV CODE 250

## 2019-04-16 PROCEDURE — 87075 CULTR BACTERIA EXCEPT BLOOD: CPT

## 2019-04-16 PROCEDURE — 700102 HCHG RX REV CODE 250 W/ 637 OVERRIDE(OP): Performed by: ANESTHESIOLOGY

## 2019-04-16 PROCEDURE — 87077 CULTURE AEROBIC IDENTIFY: CPT | Mod: 91

## 2019-04-16 PROCEDURE — 700101 HCHG RX REV CODE 250: Performed by: ANESTHESIOLOGY

## 2019-04-16 PROCEDURE — A9270 NON-COVERED ITEM OR SERVICE: HCPCS | Performed by: ANESTHESIOLOGY

## 2019-04-16 PROCEDURE — 700111 HCHG RX REV CODE 636 W/ 250 OVERRIDE (IP): Performed by: SURGERY

## 2019-04-16 PROCEDURE — 501838 HCHG SUTURE GENERAL: Performed by: SURGERY

## 2019-04-16 RX ORDER — MEPERIDINE HYDROCHLORIDE 25 MG/ML
INJECTION INTRAMUSCULAR; INTRAVENOUS; SUBCUTANEOUS PRN
Status: DISCONTINUED | OUTPATIENT
Start: 2019-04-16 | End: 2019-04-16 | Stop reason: SURG

## 2019-04-16 RX ORDER — ONDANSETRON 2 MG/ML
INJECTION INTRAMUSCULAR; INTRAVENOUS PRN
Status: DISCONTINUED | OUTPATIENT
Start: 2019-04-16 | End: 2019-04-16 | Stop reason: SURG

## 2019-04-16 RX ORDER — DILTIAZEM HYDROCHLORIDE 120 MG/1
240 CAPSULE, COATED, EXTENDED RELEASE ORAL
Status: DISCONTINUED | OUTPATIENT
Start: 2019-04-16 | End: 2019-04-22 | Stop reason: HOSPADM

## 2019-04-16 RX ORDER — ONDANSETRON 2 MG/ML
4 INJECTION INTRAMUSCULAR; INTRAVENOUS EVERY 4 HOURS PRN
Status: DISCONTINUED | OUTPATIENT
Start: 2019-04-16 | End: 2019-04-22 | Stop reason: HOSPADM

## 2019-04-16 RX ORDER — METOPROLOL TARTRATE 1 MG/ML
1 INJECTION, SOLUTION INTRAVENOUS
Status: DISCONTINUED | OUTPATIENT
Start: 2019-04-16 | End: 2019-04-16 | Stop reason: HOSPADM

## 2019-04-16 RX ORDER — OMEPRAZOLE 20 MG/1
20 CAPSULE, DELAYED RELEASE ORAL DAILY
Status: DISCONTINUED | OUTPATIENT
Start: 2019-04-16 | End: 2019-04-22 | Stop reason: HOSPADM

## 2019-04-16 RX ORDER — SODIUM CHLORIDE, SODIUM LACTATE, POTASSIUM CHLORIDE, CALCIUM CHLORIDE 600; 310; 30; 20 MG/100ML; MG/100ML; MG/100ML; MG/100ML
INJECTION, SOLUTION INTRAVENOUS
Status: DISCONTINUED | OUTPATIENT
Start: 2019-04-16 | End: 2019-04-16 | Stop reason: SURG

## 2019-04-16 RX ORDER — MIDAZOLAM HYDROCHLORIDE 1 MG/ML
1 INJECTION INTRAMUSCULAR; INTRAVENOUS
Status: DISCONTINUED | OUTPATIENT
Start: 2019-04-16 | End: 2019-04-16 | Stop reason: HOSPADM

## 2019-04-16 RX ORDER — ONDANSETRON 2 MG/ML
4 INJECTION INTRAMUSCULAR; INTRAVENOUS
Status: DISCONTINUED | OUTPATIENT
Start: 2019-04-16 | End: 2019-04-16 | Stop reason: HOSPADM

## 2019-04-16 RX ORDER — MORPHINE SULFATE 4 MG/ML
2 INJECTION, SOLUTION INTRAMUSCULAR; INTRAVENOUS
Status: DISCONTINUED | OUTPATIENT
Start: 2019-04-16 | End: 2019-04-17

## 2019-04-16 RX ORDER — MEPERIDINE HYDROCHLORIDE 25 MG/ML
25 INJECTION INTRAMUSCULAR; INTRAVENOUS; SUBCUTANEOUS
Status: COMPLETED | OUTPATIENT
Start: 2019-04-16 | End: 2019-04-16

## 2019-04-16 RX ORDER — SODIUM CHLORIDE, SODIUM LACTATE, POTASSIUM CHLORIDE, CALCIUM CHLORIDE 600; 310; 30; 20 MG/100ML; MG/100ML; MG/100ML; MG/100ML
INJECTION, SOLUTION INTRAVENOUS CONTINUOUS
Status: DISCONTINUED | OUTPATIENT
Start: 2019-04-16 | End: 2019-04-16 | Stop reason: HOSPADM

## 2019-04-16 RX ORDER — BUPIVACAINE HYDROCHLORIDE AND EPINEPHRINE 5; 5 MG/ML; UG/ML
INJECTION, SOLUTION EPIDURAL; INTRACAUDAL; PERINEURAL
Status: DISPENSED
Start: 2019-04-16 | End: 2019-04-17

## 2019-04-16 RX ORDER — SPIRONOLACTONE 50 MG/1
25 TABLET, FILM COATED ORAL
Status: DISCONTINUED | OUTPATIENT
Start: 2019-04-16 | End: 2019-04-22 | Stop reason: HOSPADM

## 2019-04-16 RX ORDER — OXYCODONE HCL 5 MG/5 ML
5 SOLUTION, ORAL ORAL
Status: COMPLETED | OUTPATIENT
Start: 2019-04-16 | End: 2019-04-16

## 2019-04-16 RX ORDER — DIPHENHYDRAMINE HYDROCHLORIDE 50 MG/ML
12.5 INJECTION INTRAMUSCULAR; INTRAVENOUS
Status: DISCONTINUED | OUTPATIENT
Start: 2019-04-16 | End: 2019-04-16 | Stop reason: HOSPADM

## 2019-04-16 RX ORDER — DEXTROSE MONOHYDRATE, SODIUM CHLORIDE, AND POTASSIUM CHLORIDE 50; 1.49; 4.5 G/1000ML; G/1000ML; G/1000ML
INJECTION, SOLUTION INTRAVENOUS CONTINUOUS
Status: DISCONTINUED | OUTPATIENT
Start: 2019-04-16 | End: 2019-04-17

## 2019-04-16 RX ORDER — OXYCODONE HCL 5 MG/5 ML
10 SOLUTION, ORAL ORAL
Status: COMPLETED | OUTPATIENT
Start: 2019-04-16 | End: 2019-04-16

## 2019-04-16 RX ORDER — HALOPERIDOL 5 MG/ML
1 INJECTION INTRAMUSCULAR
Status: DISCONTINUED | OUTPATIENT
Start: 2019-04-16 | End: 2019-04-16 | Stop reason: HOSPADM

## 2019-04-16 RX ORDER — CEFAZOLIN SODIUM 1 G/3ML
INJECTION, POWDER, FOR SOLUTION INTRAMUSCULAR; INTRAVENOUS PRN
Status: DISCONTINUED | OUTPATIENT
Start: 2019-04-16 | End: 2019-04-16 | Stop reason: SURG

## 2019-04-16 RX ADMIN — PROPOFOL 20 MG: 10 INJECTION, EMULSION INTRAVENOUS at 16:11

## 2019-04-16 RX ADMIN — ROCURONIUM BROMIDE 5 MG: 10 INJECTION, SOLUTION INTRAVENOUS at 16:11

## 2019-04-16 RX ADMIN — MORPHINE SULFATE 2 MG: 4 INJECTION INTRAVENOUS at 12:25

## 2019-04-16 RX ADMIN — POTASSIUM CHLORIDE, DEXTROSE MONOHYDRATE AND SODIUM CHLORIDE: 150; 5; 450 INJECTION, SOLUTION INTRAVENOUS at 20:37

## 2019-04-16 RX ADMIN — CEFAZOLIN 2 G: 330 INJECTION, POWDER, FOR SOLUTION INTRAMUSCULAR; INTRAVENOUS at 16:10

## 2019-04-16 RX ADMIN — POTASSIUM CHLORIDE, DEXTROSE MONOHYDRATE AND SODIUM CHLORIDE: 150; 5; 450 INJECTION, SOLUTION INTRAVENOUS at 13:46

## 2019-04-16 RX ADMIN — OXYCODONE HYDROCHLORIDE 5 MG: 5 SOLUTION ORAL at 17:06

## 2019-04-16 RX ADMIN — AMPICILLIN SODIUM AND SULBACTAM SODIUM 3 G: 2; 1 INJECTION, POWDER, FOR SOLUTION INTRAMUSCULAR; INTRAVENOUS at 13:46

## 2019-04-16 RX ADMIN — MEPERIDINE HYDROCHLORIDE 25 MG: 25 INJECTION INTRAMUSCULAR; INTRAVENOUS; SUBCUTANEOUS at 17:43

## 2019-04-16 RX ADMIN — MEPERIDINE HYDROCHLORIDE 25 MG: 25 INJECTION INTRAMUSCULAR; INTRAVENOUS; SUBCUTANEOUS at 16:38

## 2019-04-16 RX ADMIN — SUCCINYLCHOLINE CHLORIDE 60 MG: 20 INJECTION, SOLUTION INTRAMUSCULAR; INTRAVENOUS at 16:13

## 2019-04-16 RX ADMIN — ALFENTANIL HYDROCHLORIDE 1000 MCG: 500 INJECTION, SOLUTION INTRAVENOUS at 16:11

## 2019-04-16 RX ADMIN — SODIUM CHLORIDE, POTASSIUM CHLORIDE, SODIUM LACTATE AND CALCIUM CHLORIDE: 600; 310; 30; 20 INJECTION, SOLUTION INTRAVENOUS at 16:10

## 2019-04-16 RX ADMIN — PROPOFOL 100 MG: 10 INJECTION, EMULSION INTRAVENOUS at 16:13

## 2019-04-16 RX ADMIN — MORPHINE SULFATE: 50 INJECTION, SOLUTION, CONCENTRATE INTRAVENOUS at 20:18

## 2019-04-16 RX ADMIN — ONDANSETRON 4 MG: 2 INJECTION INTRAMUSCULAR; INTRAVENOUS at 16:38

## 2019-04-16 RX ADMIN — AMPICILLIN SODIUM AND SULBACTAM SODIUM 3 G: 2; 1 INJECTION, POWDER, FOR SOLUTION INTRAMUSCULAR; INTRAVENOUS at 23:22

## 2019-04-16 ASSESSMENT — COGNITIVE AND FUNCTIONAL STATUS - GENERAL
SUGGESTED CMS G CODE MODIFIER DAILY ACTIVITY: CJ
MOBILITY SCORE: 23
DRESSING REGULAR UPPER BODY CLOTHING: A LITTLE
DAILY ACTIVITIY SCORE: 21
HELP NEEDED FOR BATHING: A LITTLE
SUGGESTED CMS G CODE MODIFIER MOBILITY: CI
DRESSING REGULAR LOWER BODY CLOTHING: A LITTLE
TURNING FROM BACK TO SIDE WHILE IN FLAT BAD: A LITTLE

## 2019-04-16 ASSESSMENT — LIFESTYLE VARIABLES
EVER_SMOKED: YES
ALCOHOL_USE: NO

## 2019-04-16 ASSESSMENT — PATIENT HEALTH QUESTIONNAIRE - PHQ9
2. FEELING DOWN, DEPRESSED, IRRITABLE, OR HOPELESS: NOT AT ALL
1. LITTLE INTEREST OR PLEASURE IN DOING THINGS: NOT AT ALL
SUM OF ALL RESPONSES TO PHQ9 QUESTIONS 1 AND 2: 0

## 2019-04-16 ASSESSMENT — PAIN SCALES - GENERAL: PAIN_LEVEL: 0

## 2019-04-16 NOTE — ANESTHESIA PROCEDURE NOTES
Airway  Date/Time: 4/16/2019 4:14 PM  Performed by: VIANEY MCCORMACK  Authorized by: VIANYE MCCORMACK     Location:  OR  Urgency:  Elective  Indications for Airway Management:  Anesthesia  Spontaneous Ventilation: absent    Sedation Level:  Deep  Preoxygenated: Yes    Patient Position:  Sniffing  Final Airway Type:  Supraglottic airway  SGA Size:  4  Number of Attempts at Approach:  1

## 2019-04-16 NOTE — PROGRESS NOTES
Pt to pre op holding via [facility] with transport. Surgical consent signed and on chart.

## 2019-04-16 NOTE — ANESTHESIA TIME REPORT
Anesthesia Start and Stop Event Times     Date Time Event    4/16/2019 1610 Anesthesia Start     1646 Anesthesia Stop        Responsible Staff  04/16/19    Name Role Begin End    Lance Yi M.D. Anesth 1610 1646        Preop Diagnosis (Free Text):  Pre-op Diagnosis     POST OPERATIVE WOUND INFECTION RIGHT MASTECTOMY SITE        Preop Diagnosis (Codes):  Diagnosis Information     Diagnosis Code(s):         Post op Diagnosis  Post op infection      Premium Reason  A. 3PM - 7AM    Comments: I & D , washout breast right only

## 2019-04-16 NOTE — ANESTHESIA QCDR
2019 [facility] Clinical Data Registry (for Quality Improvement)     Postoperative nausea/vomiting risk protocol (Adult = 18 yrs and Pediatric 3-17 yrs)- (430 and 463)  General inhalation anesthetic (NOT TIVA) with PONV risk factors: Yes  Provision of anti-emetic therapy with at least 2 different classes of agents: Yes   Patient DID NOT receive anti-emetic therapy and reason is documented in Medical Record:  N/A    Multimodal Pain Management- (AQI59)  Patient undergoing Elective Surgery (i.e. Outpatient, or ASC, or Prescheduled Surgery prior to Hospital Admission): No  Use of Multimodal Pain Management, two or more drugs and/or interventions, NOT including systemic opioids: N/A  Exception: Documented allergy to multiple classes of analgesics: N/A    PACU assessment of acute postoperative pain prior to Anesthesia Care End- Applies to Patients Age = 18- (ABG7)  Initial PACU pain score is which of the following: < 7/10  Patient unable to report pain score: N/A    Post-anesthetic transfer of care checklist/protocol to PACU/ICU- (426 and 427)  Upon conclusion of case, patient transferred to which of the following locations: PACU/Non-ICU  Use of transfer checklist/protocol: Yes  Exclusion: Service Performed in Patient Hospital Room (and thus did not require transfer): N/A    PACU Reintubation- (AQI31)  General anesthesia requiring endotracheal intubation (ETT) along with subsequent extubation in OR or PACU: No  Required reintubation in the PACU: N/A  Extubation was a planned trial documented in the medical record prior to removal of the original airway device: N/A    Unplanned admission to ICU related to anesthesia service up through end of PACU care- (MD51)  Unplanned admission to ICU (not initially anticipated at anesthesia start time): No

## 2019-04-16 NOTE — ANESTHESIA PREPROCEDURE EVALUATION
Relevant Problems   (+) GERD (gastroesophageal reflux disease)   (+) Hypertension       Physical Exam    Airway   Mallampati: II  TM distance: >3 FB  Neck ROM: full       Cardiovascular - normal exam  Rhythm: regular  Rate: normal  (-) murmur     Dental - normal exam         Pulmonary - normal exam  Breath sounds clear to auscultation     Abdominal    Neurological - normal exam                 Anesthesia Plan    ASA 2       Plan - general             Induction: intravenous    Postoperative Plan: Postoperative administration of opioids is intended.    Pertinent diagnostic labs and testing reviewed    Informed Consent:    Anesthetic plan and risks discussed with patient.    Use of blood products discussed with: patient whom consented to blood products.

## 2019-04-16 NOTE — PROGRESS NOTES
2 RN skin check:   Left breast with scabbed healing incision.  Right breast with large dark and hard area.  All other bony prominences are intact.

## 2019-04-16 NOTE — PROGRESS NOTES
Pt received as direct admit from Dr. Marmolejo's office. Plan of care for shift discussed with pt and her . Oriented to unit. IS provided. Instructions given on use. Will obtain scd machine. Call light within reach.

## 2019-04-16 NOTE — OR SURGEON
Immediate Post OP Note    PreOp Diagnosis: Post operative wound infection, right simple mastectomy site    PostOp Diagnosis:Post operative wound infection, right simple mastectomy site     Procedure(s):  Incision, drainage, and debridement of right mastectomy site wound infection - Wound Class: Dirty    Surgeon(s):  Jorgito Marmolejo M.D.    Anesthesiologist/Type of Anesthesia:  Anesthesiologist: Lance Yi M.D./General    Surgical Staff:  Circulator: Teressa Ruvalcaba R.N.; Mayra Santana R.N.  Scrub Person: Sohail Yeung    Specimens removed if any:  ID Type Source Tests Collected by Time Destination   1 : Right Mastectomy Site Other Other AEROBIC/ANAEROBIC CULTURE (SURGERY) Jorgito Marmolejo M.D. 4/16/2019  4:26 PM        Estimated Blood Loss: < 5 ml    Findings: Necrotic tissue, likely old blood, and pus within the right mastectomy site.  Necrotic skin superior aspect of mastectomy incision.    Complications: None        4/16/2019 4:43 PM Jorgito Marmolejo M.D.

## 2019-04-16 NOTE — ANESTHESIA POSTPROCEDURE EVALUATION
Patient: Melanie Leyva    Procedure Summary     Date:  04/16/19 Room / Location:  [facility] ROOM 21 / SURGERY SAME DAY [facility]    Anesthesia Start:  1610 Anesthesia Stop:  1646    Procedure:  INCISION AND DRAINAGE - MASTECTOMY SITE (Breast) Diagnosis:  (POST OPERATIVE WOUND INFECTION RIGHT MASTECTOMY SITE)    Surgeon:  Jorgito Marmolejo M.D. Responsible Provider:  Lance Yi M.D.    Anesthesia Type:  general ASA Status:  2          Final Anesthesia Type: general  Last vitals  BP   Blood Pressure: 108/62    Temp   37.1 °C (98.8 °F)    Pulse   Pulse: 89   Resp   18    SpO2   96 %      Anesthesia Post Evaluation    Patient location during evaluation: PACU  Patient participation: complete - patient participated  Level of consciousness: awake and alert  Pain score: 0    Airway patency: patent  Anesthetic complications: no  Cardiovascular status: hemodynamically stable  Respiratory status: acceptable  Hydration status: euvolemic    PONV: none           Nurse Pain Score: 4 (NPRS)

## 2019-04-16 NOTE — CARE PLAN
Problem: Safety  Goal: Will remain free from injury  Pt oriented to unit and plan.     Problem: Infection  Goal: Will remain free from infection  CHG wipes completed prior to surgery and pt brushed her teeth

## 2019-04-16 NOTE — OR NURSING
1645  Pt arrived to PACU from OR    1700 Dr Marmolejo at bs talking with pt and .  Pt states she is allergic to tape on current bandage.  Dr Marmolejo states ok to remove bandage now and replace with ace bandage    1707  Pt given 5mg oxycodone for pain.    1715  Bandage removed per Dr Marmolejo, packing and kerlix remaining - bandage replaced with ace wrap.  Skin around wound C,D&I.  VS stable on RA.  Pt states pain is tolerable, meets d/c criteria.    1743  Pt given demerol 25 mg for pain.    1750  Report given to STAN Menendez on GSU.  Pt states pain is now only 3/10 and tolerable.  VS stable on RA.    1828  PT transported to room

## 2019-04-16 NOTE — PROGRESS NOTES
Direct admit from MD office. Accepted by Dr. Marmolejo for post op wound infection.  ADT signed & held, needs to be released upon pt arrival.  Written orders received, faxed to unit and scanned to media tab.  Pt coming by POV.

## 2019-04-17 LAB
GRAM STN SPEC: NORMAL
SIGNIFICANT IND 70042: NORMAL
SITE SITE: NORMAL
SOURCE SOURCE: NORMAL

## 2019-04-17 PROCEDURE — 700111 HCHG RX REV CODE 636 W/ 250 OVERRIDE (IP): Performed by: SURGERY

## 2019-04-17 PROCEDURE — 700105 HCHG RX REV CODE 258: Performed by: SURGERY

## 2019-04-17 PROCEDURE — 700105 HCHG RX REV CODE 258

## 2019-04-17 PROCEDURE — 700101 HCHG RX REV CODE 250: Performed by: SURGERY

## 2019-04-17 PROCEDURE — 770006 HCHG ROOM/CARE - MED/SURG/GYN SEMI*

## 2019-04-17 PROCEDURE — 700102 HCHG RX REV CODE 250 W/ 637 OVERRIDE(OP): Performed by: SURGERY

## 2019-04-17 PROCEDURE — A9270 NON-COVERED ITEM OR SERVICE: HCPCS | Performed by: SURGERY

## 2019-04-17 RX ORDER — MORPHINE SULFATE 4 MG/ML
2-4 INJECTION, SOLUTION INTRAMUSCULAR; INTRAVENOUS
Status: DISCONTINUED | OUTPATIENT
Start: 2019-04-17 | End: 2019-04-22 | Stop reason: HOSPADM

## 2019-04-17 RX ORDER — OXYCODONE HYDROCHLORIDE AND ACETAMINOPHEN 5; 325 MG/1; MG/1
1-2 TABLET ORAL EVERY 4 HOURS PRN
Status: DISCONTINUED | OUTPATIENT
Start: 2019-04-17 | End: 2019-04-22 | Stop reason: HOSPADM

## 2019-04-17 RX ORDER — SODIUM CHLORIDE 9 MG/ML
INJECTION, SOLUTION INTRAVENOUS
Status: COMPLETED
Start: 2019-04-17 | End: 2019-04-17

## 2019-04-17 RX ADMIN — OXYCODONE HYDROCHLORIDE AND ACETAMINOPHEN 2 TABLET: 5; 325 TABLET ORAL at 20:57

## 2019-04-17 RX ADMIN — OXYCODONE HYDROCHLORIDE AND ACETAMINOPHEN 2 TABLET: 5; 325 TABLET ORAL at 14:54

## 2019-04-17 RX ADMIN — SPIRONOLACTONE 25 MG: 50 TABLET ORAL at 05:34

## 2019-04-17 RX ADMIN — AMPICILLIN SODIUM AND SULBACTAM SODIUM 3 G: 2; 1 INJECTION, POWDER, FOR SOLUTION INTRAMUSCULAR; INTRAVENOUS at 16:42

## 2019-04-17 RX ADMIN — AMPICILLIN SODIUM AND SULBACTAM SODIUM 3 G: 2; 1 INJECTION, POWDER, FOR SOLUTION INTRAMUSCULAR; INTRAVENOUS at 11:07

## 2019-04-17 RX ADMIN — MORPHINE SULFATE 4 MG: 4 INJECTION INTRAVENOUS at 22:32

## 2019-04-17 RX ADMIN — OXYCODONE HYDROCHLORIDE AND ACETAMINOPHEN 2 TABLET: 5; 325 TABLET ORAL at 09:27

## 2019-04-17 RX ADMIN — MORPHINE SULFATE 4 MG: 4 INJECTION INTRAVENOUS at 16:42

## 2019-04-17 RX ADMIN — POTASSIUM CHLORIDE, DEXTROSE MONOHYDRATE AND SODIUM CHLORIDE: 150; 5; 450 INJECTION, SOLUTION INTRAVENOUS at 05:34

## 2019-04-17 RX ADMIN — SODIUM CHLORIDE 500 ML: 9 INJECTION, SOLUTION INTRAVENOUS at 10:37

## 2019-04-17 RX ADMIN — DILTIAZEM HYDROCHLORIDE 240 MG: 120 CAPSULE, COATED, EXTENDED RELEASE ORAL at 05:34

## 2019-04-17 RX ADMIN — OMEPRAZOLE 20 MG: 20 CAPSULE, DELAYED RELEASE ORAL at 05:34

## 2019-04-17 RX ADMIN — AMPICILLIN SODIUM AND SULBACTAM SODIUM 3 G: 2; 1 INJECTION, POWDER, FOR SOLUTION INTRAMUSCULAR; INTRAVENOUS at 05:34

## 2019-04-17 RX ADMIN — AMPICILLIN SODIUM AND SULBACTAM SODIUM 3 G: 2; 1 INJECTION, POWDER, FOR SOLUTION INTRAMUSCULAR; INTRAVENOUS at 23:38

## 2019-04-17 ASSESSMENT — ENCOUNTER SYMPTOMS
VOMITING: 0
NAUSEA: 0
ABDOMINAL PAIN: 0

## 2019-04-17 NOTE — FACE TO FACE
Face to Face Supporting Documentation - Home Health    The encounter with this patient was in whole or in part the primary reason for home health admission.    Date of encounter:   Patient:                    MRN:                       YOB: 2021  Melanie Leyva  [id number]  1960     Home health to see patient for:  Skilled Nursing care for assessment, interventions & education and Wound Care    Skilled need for:  Surgical Aftercare Wound vac for right mastectomy site    Skilled nursing interventions to include:  Wound Care    Homebound status evidenced by:  Have a condition such that leaving his or her home is medically contraindicated. Leaving home requires a considerable and taxing effort. There is a normal inability to leave the home.    Community Physician to provide follow up care: Satinder Mora D.O.     Optional Interventions? No      I certify the face to face encounter for this home health care referral meets the CMS requirements and the encounter/clinical assessment with the patient was, in whole, or in part, for the medical condition(s) listed above, which is the primary reason for home health care. Based on my clinical findings: the service(s) are medically necessary, support the need for home health care, and the homebound criteria are met.  I certify that this patient has had a face to face encounter by myself.  Jorgito Marmolejo M.D. - NPI: [id number]

## 2019-04-17 NOTE — PROGRESS NOTES
Dressing changed to right breast wound. Dressing removed and then pt allowed to shower. Wound then packed with saline moistened kerlix, abd pads to top of wound and ace wrap. Pt tolerated procedure well. Pt provided percocet prior to dressing change. Pain well controlled at this time.

## 2019-04-17 NOTE — PROGRESS NOTES
Pt aox 4. No visible signs of distress. VSS.  Tolerating medication regimen without any signs or symptoms of adverse reactions.  Pt reports 3/10 pain, medicated per MAR.  Tolerating diet without any n/v. + BS, - BM  Ambulatory standby assist.  Surgical dressing became saturated x1 this shift, ACE wrap and roll gauze changed with ABD placed beneath.  On room air, no complaints of SOB.  Bed locked and in low position.  Call light within reach and able to make all needs be known.  Will continue to monitor.

## 2019-04-17 NOTE — OP REPORT
DATE OF SERVICE:  04/16/2019    PREOPERATIVE DIAGNOSES:  Postoperative wound infection, right simple   mastectomy site.    POSTOPERATIVE DIAGNOSES:  Postoperative wound infection, right simple   mastectomy site.    INDICATION FOR SURGERY:  This is a 58-year-old female who underwent a right   simple mastectomy and a left mastectomy with sentinel lymph node biopsy for   invasive ductal carcinoma of the left breast on 04/02/2019.  She was seen in   my office 3 days ago and had both of her drains removed.  At that time, she   had no evidence of an infection.  Two days ago; however, she spiked a fever   and yesterday she began draining purulent material out of the right breast   mastectomy incision.  She was also noted to have some necrotic skin at that   site as well.  She was seen in my office today and a large amount of purulent   material was drained out in the office, but it was felt that she needed an   incision and drainage and debridement of her right mastectomy site for   definitive treatment and was brought to the operating room this evening for   that procedure.    PROCEDURE PERFORMED:  Incision, drainage and debridement of right mastectomy   site wound infection.    SURGEON:  Jorgito Marmolejo MD    ASSISTANT:  None.    ANESTHESIOLOGIST:  Lance Yi MD    ANESTHESIA:  General anesthesia with LMA.    FINDINGS:  Necrotic tissue consistent with old degenerating blood in the right   mastectomy site.  Purulent fluid within the right mastectomy site.  Necrotic   skin on the superior aspect of the mastectomy incision.    PROCEDURE NARRATIVE:  Signed informed consent was on the chart at the time of   the procedure.  The patient was brought to the operating room and placed in   the supine position.  General anesthesia with LMA was induced.  A timeout was   performed.  The patient was noted to be receiving Unasyn as a scheduled   antibiotic.  The skin over both the right and left mastectomy sites was   prepped  and draped in a sterile fashion.  Using a heavy scissor, the   mastectomy site was opened by cutting the sutures.  This was only opened at   the right mastectomy site.  There was purulent fluid filling the lateral   aspect of the wound.  This was sampled and sent for Gram stain, culture and   sensitivity.  The purulent fluid and the necrotic tissue, which was consistent   with old degenerating blood was suctioned out and manually removed from the   right mastectomy site.  Three liters of warm normal saline were used with a   Pulsavac to clean the wound.  Some very minimal bleeding from the already   budding granulation tissue was addressed with careful Bovie cautery.  Once I   was satisfied that the wound was cleaned up, I did address the necrotic skin   on the superior aspect of the incision.  This was carefully sharply debrided   off of the surrounding healthy tissue.  Hemostasis was obtained with very   judicious use of the Bovie cautery.  The wound was reinspected and no further   bleeding was appreciated.  The wound was packed with 2 separate Kerlix gauzes   that had been moistened with normal saline and run almost completely dry.    This was then covered with an ABD pad and held in place with a small amount of   tape.    FLUIDS:  200 mL crystalloid.    ESTIMATED BLOOD LOSS:  Less than 5 mL    DRAINS:  None.    SPECIMENS:  Mastectomy site fluid to the lab for Gram stain, culture and   sensitivity.    COMPLICATIONS:  None.    DISPOSITION:  The patient was in stable condition to the postanesthesia care   unit.       ____________________________________     MD JON Rodriguez / LIOR    DD:  04/16/2019 17:00:44  DT:  04/16/2019 17:17:00    D#:  [id number]  Job#:  [id number]

## 2019-04-17 NOTE — PROGRESS NOTES
Surgical Progress Note    Author: Jorgito Marmolejo Date & Time created: 2019   7:20 AM     Interval Events:  Pain well controlled.  Ambulating.  Tolerating diet.  Dressing reinforced once last night    Review of Systems   Gastrointestinal: Negative for abdominal pain, nausea and vomiting.     Hemodynamics:  Temp (24hrs), Av.8 °C (98.3 °F), Min:36.6 °C (97.9 °F), Max:37.1 °C (98.8 °F)  Temperature: 36.7 °C (98 °F)  Pulse  Av.2  Min: 74  Max: 96   Blood Pressure: 127/65, NIBP: 112/80     Respiratory:    Respiration: 18, Pulse Oximetry: 93 %           Neuro:  GCS = 15       Fluids:    Intake/Output Summary (Last 24 hours) at 19 0720  Last data filed at 19 0538   Gross per 24 hour   Intake           2179.5 ml   Output             1125 ml   Net           1054.5 ml     Weight: 97.1 kg (214 lb 1.1 oz)  Current Diet Order   Procedures   • Diet Order Regular     Physical Exam   Constitutional: She is oriented to person, place, and time. She appears well-developed and well-nourished. No distress.   HENT:   Head: Normocephalic.   Eyes: Pupils are equal, round, and reactive to light.   Cardiovascular: Normal rate, regular rhythm and normal heart sounds.    Pulmonary/Chest: Effort normal and breath sounds normal. No respiratory distress. She exhibits tenderness.   Right mastectomy site packing in place, clean   Abdominal: Soft. There is no tenderness.   Neurological: She is alert and oriented to person, place, and time.   Skin: Skin is warm and dry.   Psychiatric: She has a normal mood and affect. Her behavior is normal.     Labs:  Recent Results (from the past 24 hour(s))   CBC WITH DIFFERENTIAL    Collection Time: 19 12:18 PM   Result Value Ref Range    WBC 10.8 4.8 - 10.8 K/uL    RBC 4.03 (L) 4.20 - 5.40 M/uL    Hemoglobin 11.8 (L) 12.0 - 16.0 g/dL    Hematocrit 35.6 (L) 37.0 - 47.0 %    MCV 88.3 81.4 - 97.8 fL    MCH 29.3 27.0 - 33.0 pg    MCHC 33.1 (L) 33.6 - 35.0 g/dL    RDW 47.2 35.9 -  50.0 fL    Platelet Count 431 164 - 446 K/uL    MPV 9.4 9.0 - 12.9 fL    Neutrophils-Polys 70.80 44.00 - 72.00 %    Lymphocytes 12.10 (L) 22.00 - 41.00 %    Monocytes 14.60 (H) 0.00 - 13.40 %    Eosinophils 1.50 0.00 - 6.90 %    Basophils 0.40 0.00 - 1.80 %    Immature Granulocytes 0.60 0.00 - 0.90 %    Nucleated RBC 0.00 /100 WBC    Neutrophils (Absolute) 7.64 (H) 2.00 - 7.15 K/uL    Lymphs (Absolute) 1.30 1.00 - 4.80 K/uL    Monos (Absolute) 1.57 (H) 0.00 - 0.85 K/uL    Eos (Absolute) 0.16 0.00 - 0.51 K/uL    Baso (Absolute) 0.04 0.00 - 0.12 K/uL    Immature Granulocytes (abs) 0.06 0.00 - 0.11 K/uL    NRBC (Absolute) 0.00 K/uL   Basic Metabolic Panel    Collection Time: 04/16/19 12:18 PM   Result Value Ref Range    Sodium 132 (L) 135 - 145 mmol/L    Potassium 5.6 (H) 3.6 - 5.5 mmol/L    Chloride 98 96 - 112 mmol/L    Co2 25 20 - 33 mmol/L    Glucose 107 (H) 65 - 99 mg/dL    Bun 19 8 - 22 mg/dL    Creatinine 1.19 0.50 - 1.40 mg/dL    Calcium 8.8 8.5 - 10.5 mg/dL    Anion Gap 9.0 0.0 - 11.9   ESTIMATED GFR    Collection Time: 04/16/19 12:18 PM   Result Value Ref Range    GFR If  56 (A) >60 mL/min/1.73 m 2    GFR If Non  46 (A) >60 mL/min/1.73 m 2   GRAM STAIN    Collection Time: 04/16/19  4:26 PM   Result Value Ref Range    Significant Indicator .     Source WND     Site right mastectomy     Gram Stain Result Many WBCs.  Rare Gram negative rods.        Medical Decision Making, by Problem:  Post op wound infection, right mastectomy site - now status post drainage and debridement of right mastectomy site, doing well.  Immunoglobulin deficiency  GERD  HTN    Plan:  Start moist to dry dressing changes today, then consider wound vac tomorrow.  Discharge planning for home wound vac later this week.    Quality Measures:  Quality-Core Measures   Reviewed items::  Medications reviewed  Carlin catheter::  No Carlin  DVT prophylaxis pharmacological::  Not indicated at this time, ambulatory  DVT  prophylaxis - mechanical:  SCDs  Ulcer Prophylaxis::  Not indicated  Antibiotics:  Treating active infection/contamination beyond 24 hours perioperative coverage      Discussed patient condition with RN and Patient

## 2019-04-18 ENCOUNTER — PATIENT OUTREACH (OUTPATIENT)
Dept: OTHER | Facility: MEDICAL CENTER | Age: 59
End: 2019-04-18

## 2019-04-18 PROCEDURE — 770006 HCHG ROOM/CARE - MED/SURG/GYN SEMI*

## 2019-04-18 PROCEDURE — 700111 HCHG RX REV CODE 636 W/ 250 OVERRIDE (IP): Performed by: SURGERY

## 2019-04-18 PROCEDURE — 700105 HCHG RX REV CODE 258: Performed by: SURGERY

## 2019-04-18 PROCEDURE — 700102 HCHG RX REV CODE 250 W/ 637 OVERRIDE(OP): Performed by: SURGERY

## 2019-04-18 PROCEDURE — A9270 NON-COVERED ITEM OR SERVICE: HCPCS | Performed by: SURGERY

## 2019-04-18 PROCEDURE — 97606 NEG PRS WND THER DME>50 SQCM: CPT

## 2019-04-18 PROCEDURE — 700105 HCHG RX REV CODE 258: Performed by: INTERNAL MEDICINE

## 2019-04-18 PROCEDURE — 700111 HCHG RX REV CODE 636 W/ 250 OVERRIDE (IP): Performed by: INTERNAL MEDICINE

## 2019-04-18 RX ORDER — IMMUNE GLOBULIN 50 MG/ML
10 SOLUTION INTRAVENOUS ONCE
Status: COMPLETED | OUTPATIENT
Start: 2019-04-18 | End: 2019-04-18

## 2019-04-18 RX ORDER — IMMUNE GLOBULIN 50 MG/ML
10 SOLUTION INTRAVENOUS ONCE
Status: DISCONTINUED | OUTPATIENT
Start: 2019-04-18 | End: 2019-04-18

## 2019-04-18 RX ORDER — METHYLPREDNISOLONE SODIUM SUCCINATE 125 MG/2ML
125 INJECTION, POWDER, LYOPHILIZED, FOR SOLUTION INTRAMUSCULAR; INTRAVENOUS ONCE
Status: COMPLETED | OUTPATIENT
Start: 2019-04-18 | End: 2019-04-18

## 2019-04-18 RX ORDER — BISACODYL 10 MG
10 SUPPOSITORY, RECTAL RECTAL DAILY
Status: DISCONTINUED | OUTPATIENT
Start: 2019-04-18 | End: 2019-04-22 | Stop reason: HOSPADM

## 2019-04-18 RX ORDER — IMMUNE GLOBULIN 50 MG/ML
30 SOLUTION INTRAVENOUS ONCE
Status: DISCONTINUED | OUTPATIENT
Start: 2019-04-18 | End: 2019-04-18

## 2019-04-18 RX ORDER — DIPHENHYDRAMINE HYDROCHLORIDE 50 MG/ML
25 INJECTION INTRAMUSCULAR; INTRAVENOUS ONCE
Status: COMPLETED | OUTPATIENT
Start: 2019-04-18 | End: 2019-04-18

## 2019-04-18 RX ADMIN — OXYCODONE HYDROCHLORIDE AND ACETAMINOPHEN 2 TABLET: 5; 325 TABLET ORAL at 21:38

## 2019-04-18 RX ADMIN — DIPHENHYDRAMINE HYDROCHLORIDE 25 MG: 50 INJECTION INTRAMUSCULAR; INTRAVENOUS at 15:48

## 2019-04-18 RX ADMIN — AMPICILLIN SODIUM AND SULBACTAM SODIUM 3 G: 2; 1 INJECTION, POWDER, FOR SOLUTION INTRAMUSCULAR; INTRAVENOUS at 05:40

## 2019-04-18 RX ADMIN — BISACODYL 10 MG: 10 SUPPOSITORY RECTAL at 21:42

## 2019-04-18 RX ADMIN — MORPHINE SULFATE 4 MG: 4 INJECTION INTRAVENOUS at 10:45

## 2019-04-18 RX ADMIN — AMPICILLIN SODIUM AND SULBACTAM SODIUM 3 G: 2; 1 INJECTION, POWDER, FOR SOLUTION INTRAMUSCULAR; INTRAVENOUS at 10:45

## 2019-04-18 RX ADMIN — DILTIAZEM HYDROCHLORIDE 240 MG: 120 CAPSULE, COATED, EXTENDED RELEASE ORAL at 05:40

## 2019-04-18 RX ADMIN — OXYCODONE HYDROCHLORIDE AND ACETAMINOPHEN 2 TABLET: 5; 325 TABLET ORAL at 05:40

## 2019-04-18 RX ADMIN — IMMUNE GLOBULIN 10 G: 50 SOLUTION INTRAVENOUS at 15:55

## 2019-04-18 RX ADMIN — IMMUNE GLOBULIN 10 G: 50 SOLUTION INTRAVENOUS at 19:01

## 2019-04-18 RX ADMIN — SPIRONOLACTONE 25 MG: 50 TABLET ORAL at 05:40

## 2019-04-18 RX ADMIN — PIPERACILLIN AND TAZOBACTAM 4.5 G: 4; .5 INJECTION, POWDER, LYOPHILIZED, FOR SOLUTION INTRAVENOUS; PARENTERAL at 11:45

## 2019-04-18 RX ADMIN — METHYLPREDNISOLONE SODIUM SUCCINATE 125 MG: 125 INJECTION, POWDER, FOR SOLUTION INTRAMUSCULAR; INTRAVENOUS at 15:48

## 2019-04-18 RX ADMIN — OXYCODONE HYDROCHLORIDE AND ACETAMINOPHEN 2 TABLET: 5; 325 TABLET ORAL at 17:23

## 2019-04-18 RX ADMIN — OMEPRAZOLE 20 MG: 20 CAPSULE, DELAYED RELEASE ORAL at 05:40

## 2019-04-18 RX ADMIN — PIPERACILLIN AND TAZOBACTAM 4.5 G: 4; .5 INJECTION, POWDER, LYOPHILIZED, FOR SOLUTION INTRAVENOUS; PARENTERAL at 21:28

## 2019-04-18 RX ADMIN — IMMUNE GLOBULIN 10 G: 50 SOLUTION INTRAVENOUS at 18:01

## 2019-04-18 RX ADMIN — PIPERACILLIN AND TAZOBACTAM 4.5 G: 4; .5 INJECTION, POWDER, LYOPHILIZED, FOR SOLUTION INTRAVENOUS; PARENTERAL at 13:16

## 2019-04-18 ASSESSMENT — ENCOUNTER SYMPTOMS
VOMITING: 0
CONSTIPATION: 1
NAUSEA: 0

## 2019-04-18 NOTE — DISCHARGE PLANNING
Agency/Facility Name: [facility]   Spoke To: Hussein  Outcome: Patient declined due to insufficient staffing.     Agency/Facility Name: [facility]   Spoke To: Vicki  Outcome: Unable to accept as they do not contract with patients insurance. Private pay would be $180/visit.    CECILIA Flores notified.

## 2019-04-18 NOTE — PROGRESS NOTES
Report received from NOC shift RN.   A/O X 4. Room air.   VSS.     No current labs available.     Right upper chest dressing is saturated this am.   Wound consult in place and patient to have wound vac placed at 1200 today.   Pt to shower and get pain medication prior to wound vac placement.     Pt does not report pain at this time.     BS hypoactive X 4.   -BM. -flatus. +void.     PIV on right hand, flushed, patent. SL.     Pt is up self.     Call light and  at bedside.   No additional needs.

## 2019-04-18 NOTE — WOUND TEAM
"[facility]  Inpatient Services  Initial Wound and Skin Care Evaluation    Admission Date: 4/16/2019     Consult Date: 04/18/2019 @ 1135   [facility], [facility], SH: Reviewed    Unit where seen by Wound Team: [id number]     WOUND CONSULT RELATED TO:  Placement of NPWT dressing     SUBJECTIVE:  \"I live in [place]\"      Self Report / Pain Level:  4/10 with IV pre-medication       OBJECTIVE:  Patient lying supine in bed, dressing had just fallen out in shower.    WOUND TYPE, LOCATION, CHARACTERISTICS (Pressure Injuries: location, stage, POA or date identified)             Incision 04/02/19 (Active)     Negative Pressure Wound Therapy Breast Right (Active)   NPWT Pump Mode / Pressure Setting Continuous;125 mmHg    Dressing Type Medium;Black foam    Number of Foam Pieces Used 5      Wound 04/16/19 Full Thickness Wound Breast right, complicated open surgical (Active)   Wound Image      Site Assessment Brown;Red;Yellow    Rin-wound Assessment Red;Intact    Margins Unattached edges;Defined edges    Wound Length (cm) 4 cm    Wound Width (cm) 17.5 cm    Wound Depth (cm) 4 cm    Wound Surface Area (cm^2) 70 cm^2    Tunneling 0 cm    Undermining 5 cm    Closure Secondary intention    Drainage Amount Small    Drainage Description Serosanguineous    Non-staged Wound Description Full thickness    Treatments Cleansed    Cleansing Approved Wound Cleanser    Periwound Protectant Benzoin;Drape    Dressing Options Wound Vac    Dressing Cleansing/Solutions Not Applicable    Dressing Changed New    Dressing Status Clean;Dry;Intact    Dressing Change Frequency Tuesday, Thursday, Saturday    NEXT Dressing Change  04/20/19    NEXT Weekly Photo (Inpatient Only) 04/25/19    Odor None    Exposed Structures None    Tissue Type and Percentage 80% red, pink, 20% yellow, brown      Vascular: n/a    Lab Values:    WBC:       WBC   Date/Time Value Ref Range Status   04/16/2019 12:18 PM 10.8 4.8 - 10.8 K/uL Final     A1C:    No results found for: HBA1C "        Culture: Obtained 04/16/2019, Results:    Significant Indicator  (Positive)   POS (POS)    Source   WND    Site   right mastectomy    Culture Result Wound  (Abnormal)   -     Gram Stain Result   Many WBCs.   Rare Gram negative rods.     Culture Result Wound  (Abnormal)      Escherichia coli   Light growth     Culture Result Wound  (Abnormal)      Klebsiella pneumoniae   Light growth     Culture Result Wound  (Abnormal)      Group D Enterococcus species   Light growth   Combination therapy with ampicillin, penicillin, or   vancomycin (for susceptible strains) plus an aminoglycoside   is usually indicated for serious enterococcal infections,   such as endocarditis unless high-level resistance to both   gentamicin and streptomycin is documented; such combinations   are predicted to result in synergistic killing of the   Enterococcus.         Anaerobic Culture (Order [id number]) - Reflex for Order [id number]   Component Results     Component   Significant Indicator   NEG    Source   WND    Site   right mastectomy    Anaerobic Culture, Culture Res   Culture in progress.          INTERVENTIONS BY WOUND TEAM:  Wound cleansed with wound cleanser and gauze. Cipriano wound with moist wash cloth, wound photographed and measured. Benzoin and drape to cipriano wound. 5 pieces of black foam to fill undermined area and depth of wound, trac pad applied and NPWT started at 125 mm Hg continuous, no leaks noted.    Dressing selection:  NPWT         Interdisciplinary consultation: Patient, Bedside RN (James), Dr. Marmolejo, per surgeon, cancer was only found in left breast, with clear margins, no cancer found in right.     EVALUATION: Patient is a 59y/o female admitted with post op wound infection of right mastectomy site. Surgeon had debrided wound on 04/16 in the OR, patient had been having wet to dry gauze dressing changes. Requested to place NPWT today. Wound should improve with NPWT to promote granular tissue growth and manage  exudate.    Factors affecting wound healing: obesity, previous cancer diagnosis, per patient she is immunosuppressed and receives Ig G treatements    Goals: Steady decrease in wound area and depth weekly.    NURSING PLAN OF CARE ORDERS (X):    Dressing changes: See Dressing Care orders: X  Skin care: See Skin Care orders:   Rectal tube care: See Rectal Tube Care orders:   Other orders:    RSKIN: CURRENT (X) ORDERED (O):   Q shift Silvino:  X  Q shift pressure point assessments:  X  Pressure redistribution mattress   X         SEAN          Bariatric SEAN         Bariatric foam           Heel float boots patient is independent with bed mobility         Float Heels off Bed with Pillows               Barrier wipes         Barrier Cream         Barrier paste          Sacral silicone dressing         Silicone O2 tubing         Anchorfast         Cannula fixation Device (Tender )          Gray Foam Ear protectors           Trach with Optifoam split foam                 Waffle cushion        Waffle Overlay         Rectal tube or BMS         Antifungal tx      Interdry          Reposition q 2 hours  See above      Up to chair        Ambulate      PT/OT        Dietician        Diabetes Education      PO  X   TF     TPN     NPO   # days   Other        WOUND TEAM PLAN OF CARE (X):   NPWT change 3 x week:  X      Dressing changes by wound team:       Follow up as needed:       Other (explain):     Anticipated discharge plans (X):   SNF:           Home Care:           Outpatient Wound Center:            Self Care:            Other:  Case management setting up NPWT dressing changes in [place], possibly at [facility]

## 2019-04-18 NOTE — CONSULTS
DATE OF SERVICE:  04/18/2019    INFECTIOUS DISEASE CONSULTATION    REFERRING PHYSICIAN:  Jorgito Marmolejo MD    REASON FOR CONSULTATION:  Evaluation and antibiotic management of a   58-year-old female with known common variable immune deficiency and   postoperative right breast abscess following bilateral mastectomy for breast   cancer.    HISTORY OF PRESENT ILLNESS:  The patient is a 58-year-old female who underwent   bilateral mastectomy for invasive ductal carcinoma of the left breast on   04/02.  She was discharged home on 04/03 with Keflex 500 mg p.o. t.i.d.  She   was seen last week in followup and the drains were removed.  She states that   the drains were removed and she was told at that time to stop all antibiotics.    She was doing well until Sunday when she began having more drainage from the   right breast, was seen in the emergency room early in [place] and was then   advised to come to [place].  She saw Dr. Marmolejo that Monday and he   subsequently admitted her and took her to surgery that evening, underwent an   incision and drainage of the right breast.  She was placed on cefazolin and   then subsequently placed on Unasyn postoperatively.  [facility] Infectious   Diseases has been asked to assist with antibiotic management.  Cultures are   currently growing lactose fermenting gram-negative higinio and group D   enterococcus.    PAST MEDICAL HISTORY AND PAST SURGICAL HISTORY:  History of GERD,   hypertension, common variable immune deficiency.  She has been on monthly IVIG   since 2011.  She was due to have her next dose on Tuesday.    CURRENT MEDICATIONS:  Include Unasyn and subsequently changed to meropenem   this morning, diltiazem, morphine, Prilosec, ondansetron, and spironolactone.    ALLERGIES:  She is not allergic to any antibiotics.  Her  is at his   bedside.    PHYSICAL EXAMINATION:  VITAL SIGNS:  Her temperature is currently 36.9, blood pressure is 114/86,   heart rate of 75, respiratory  rate of 16.  HEENT:  Extraocular movements are intact.  Pupils are equal, round and   reactive to light.  Oropharynx is clear.  NECK:  Supple.  HEART:  Regular rate.  CHEST:  She has surgical incisions on bilateral breasts.  There is a wound VAC   on the right side.  Very minimal fluid is in the VAC canister.  ABDOMEN:  Moderately obese, but soft, nontender.  EXTREMITIES:  Without any edema.    LABORATORY DATA:  Her white count is currently 10.8, hemoglobin of 11.8,   hematocrit of 35.6, platelets of 431.  Sodium is 133, potassium 4.1, chloride   of 99, CO2 of 25, BUN of 16, creatinine of 1.04, glucose of 162.  Cultures are   growing 2 lactose fermenting gram-negative rods.  Further identification is   pending as well as a group D enterococcus.  Susceptibilities and final   identification is still pending.    IMPRESSION:  1.  Right-sided breast abscess following mastectomy, acute polymicrobial with   2 gram-negative rods and group D enterococcus.  Further identification is   pending.  2.  Invasive ductal carcinoma of the left breast, status post bilateral   mastectomies.  3.  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  4.  Gastroesophageal reflux disease.  5.  Hypertension.    DISCUSSION:  At this point, the patient has been changed from Unasyn to Zosyn.    She has got 2 lactose fermenting gram-negative rods.  I have expanded her   negative coverage until the final identification is pending.  She has a wound   VAC in place and pending further identification, will remain on IV antibiotics   until we can see if she can be transitioned to an oral medication.  She has a   wound VAC in place and according to the case coordinator, we are still   awaiting authorization.  She lives out in [place], so there are home health   agencies available as well as the local hospital, [facility].    Thank you for allowing us to assist in the management of this patient.    Greater than 55  minutes was spent obtaining history, performing physical exam,   reviewing laboratories, discussing with microbiology, case coordination with   Dr. Marmolejo.  Greater than 50% of the time was spent face-to-face with the   patient and her  coordinating care as well as counseling.       ____________________________________     MD BRE CONWAY / LIOR    DD:  04/18/2019 13:07:04  DT:  04/18/2019 13:38:39    D#:  [id number]  Job#:  [id number]    cc: Jorgito Marmolejo MD, CANDELARIO GREGORIO DO

## 2019-04-18 NOTE — PROGRESS NOTES
Surgical Progress Note    Author: Jorgito Marmolejo Date & Time created: 2019   8:18 AM     Interval Events:  Pain well controlled.  No BM since admit.  Tolerating moist to dry dressing changes    Review of Systems   Gastrointestinal: Positive for constipation. Negative for nausea and vomiting.     Hemodynamics:  Temp (24hrs), Av.5 °C (97.7 °F), Min:36.1 °C (96.9 °F), Max:36.9 °C (98.4 °F)  Temperature: 36.9 °C (98.4 °F)  Pulse  Av.5  Min: 74  Max: 99   Blood Pressure: 138/80     Respiratory:    Respiration: 16, Pulse Oximetry: 95 %           Neuro:  GCS = 15       Fluids:    Intake/Output Summary (Last 24 hours) at 19 0818  Last data filed at 19 0520   Gross per 24 hour   Intake             2040 ml   Output             1450 ml   Net              590 ml        Current Diet Order   Procedures   • Diet Order Regular     Physical Exam   Constitutional: She is oriented to person, place, and time. She appears well-developed and well-nourished. No distress.   Cardiovascular: Normal rate.    Pulmonary/Chest: Effort normal. No respiratory distress.   Right mastectomy site wound packing clean.  No surrounding erythema, or induration at recent surgical site.  No erythema at left mastectomy site.      Wound culture shows  1.  Lactose fermenting Gram negative higinio   Light growth       Group D Enterococcus species   Light growth          Abdominal: Soft. She exhibits no distension. There is no tenderness.   Neurological: She is alert and oriented to person, place, and time.   Skin: Skin is warm and dry.   Psychiatric: She has a normal mood and affect. Her behavior is normal.     Labs:  No results found for this or any previous visit (from the past 24 hour(s)).  Medical Decision Making, by Problem:  Post op wound infection, right mastectomy site - now status post drainage and debridement of right mastectomy site, doing well, POD # 2  Immunoglobulin deficiency  GERD  HTN    Plan:  Will consult ID for  their recommendations for antibiotic therapy going forward.  Will have wound vac placed today.\  AM labs tomorrow to include CBC with dif, CMP  Dulcolax suppository this morning  Consider home tomorrow    Quality Measures:  Quality-Core Measures    Discussed patient condition with Family, RN and Patient

## 2019-04-18 NOTE — PROGRESS NOTES
Pt aox 4. No visible signs of distress. VSS.  Tolerating medication regimen without any signs or symptoms of adverse reactions.  Pt reports 8/10 pain, medicated per MAR.  Tolerating diet without any n/v. + BS, - BM  Ambulatory self, steady gait.  Breast dressing changed with wet to dry dressing.  On room air, no complaints of SOB.  Bed locked and in low position.  Call light within reach and able to make all needs be known.

## 2019-04-18 NOTE — PROGRESS NOTES
Med Rec Updated and Complete per Pt at bedside with permission to do so in front of family/visitor  Allergies Reviewed  No PO ABX last 30 days.    Pt knows medication HX well.    Revisions were made to the Med Rec regarding the following medications:    Atorvastatin and Omeprazole.    PharmD notified.

## 2019-04-18 NOTE — PROGRESS NOTES
Oncology Nurse Navigator in person outreach. Pt currently inpatient following I&D procedure. Provided emotional support. Provided treatment bag.

## 2019-04-18 NOTE — CARE PLAN
Problem: Communication  Goal: The ability to communicate needs accurately and effectively will improve    Intervention: [place] patient and significant other/support system to call light to alert staff of needs  Pt oriented to unit, call light, and room. All questions and concerns addressed at this time.      Problem: Venous Thromboembolism (VTW)/Deep Vein Thrombosis (DVT) Prevention:  Goal: Patient will participate in Venous Thrombosis (VTE)/Deep Vein Thrombosis (DVT)Prevention Measures  Outcome: PROGRESSING AS EXPECTED  SCDs on at all times while in bed.

## 2019-04-18 NOTE — DISCHARGE PLANNING
Anticipated Discharge Disposition: Home with HH and wound vac    Action: SW met with pt at bedside.  Pt is agreeable to use [facility] which services the [place] are and is contracted with her insurance.     Barriers to Discharge: HH acceptance and wound vac    Plan: F/U on HH.  Send wound vac application

## 2019-04-18 NOTE — DISCHARGE PLANNING
Received Choice form at 0997  Agency/Facility Name: [facility]   Referral sent per Choice form @ 2381     Patient second choice is [facility].

## 2019-04-18 NOTE — DISCHARGE PLANNING
Anticipated Discharge Disposition: Home with out pt wound care    Action: Pt has been declined by [facility].  Pt is agreeable to do out pt wound care at [facility] in [place].  KAYY spoke to the  at [facility], she is requesting that the MD write an order for dressing changes and wound care, 2-3 per week for 12 weeks.  As well as the diagnosis code.  KAYY spoke to MD's .     KAYY spoke to COLLETTE Iraheta, cx are still pending.  Uncertain if pt will be orals versus IV ABX.    Barriers to Discharge: Order for out pt wound care and type of ABX.     Plan: F/U with order for out pt wound care and type of ABX.

## 2019-04-19 LAB
ALBUMIN SERPL BCP-MCNC: 3.2 G/DL (ref 3.2–4.9)
ALBUMIN/GLOB SERPL: 0.8 G/DL
ALP SERPL-CCNC: 101 U/L (ref 30–99)
ALT SERPL-CCNC: 37 U/L (ref 2–50)
ANION GAP SERPL CALC-SCNC: 8 MMOL/L (ref 0–11.9)
AST SERPL-CCNC: 22 U/L (ref 12–45)
BACTERIA SPEC ANAEROBE CULT: NORMAL
BACTERIA WND AEROBE CULT: ABNORMAL
BASOPHILS # BLD AUTO: 0.4 % (ref 0–1.8)
BASOPHILS # BLD: 0.03 K/UL (ref 0–0.12)
BILIRUB SERPL-MCNC: 0.3 MG/DL (ref 0.1–1.5)
BUN SERPL-MCNC: 11 MG/DL (ref 8–22)
CALCIUM SERPL-MCNC: 9.1 MG/DL (ref 8.5–10.5)
CHLORIDE SERPL-SCNC: 101 MMOL/L (ref 96–112)
CO2 SERPL-SCNC: 27 MMOL/L (ref 20–33)
CREAT SERPL-MCNC: 1 MG/DL (ref 0.5–1.4)
EOSINOPHIL # BLD AUTO: 0 K/UL (ref 0–0.51)
EOSINOPHIL NFR BLD: 0 % (ref 0–6.9)
ERYTHROCYTE [DISTWIDTH] IN BLOOD BY AUTOMATED COUNT: 46.7 FL (ref 35.9–50)
GLOBULIN SER CALC-MCNC: 3.9 G/DL (ref 1.9–3.5)
GLUCOSE SERPL-MCNC: 165 MG/DL (ref 65–99)
GRAM STN SPEC: ABNORMAL
HCT VFR BLD AUTO: 36 % (ref 37–47)
HGB BLD-MCNC: 11.2 G/DL (ref 12–16)
IMM GRANULOCYTES # BLD AUTO: 0.11 K/UL (ref 0–0.11)
IMM GRANULOCYTES NFR BLD AUTO: 1.5 % (ref 0–0.9)
LYMPHOCYTES # BLD AUTO: 0.64 K/UL (ref 1–4.8)
LYMPHOCYTES NFR BLD: 9 % (ref 22–41)
MCH RBC QN AUTO: 27.7 PG (ref 27–33)
MCHC RBC AUTO-ENTMCNC: 31.1 G/DL (ref 33.6–35)
MCV RBC AUTO: 89.1 FL (ref 81.4–97.8)
MONOCYTES # BLD AUTO: 0.42 K/UL (ref 0–0.85)
MONOCYTES NFR BLD AUTO: 5.9 % (ref 0–13.4)
NEUTROPHILS # BLD AUTO: 5.93 K/UL (ref 2–7.15)
NEUTROPHILS NFR BLD: 83.2 % (ref 44–72)
NRBC # BLD AUTO: 0 K/UL
NRBC BLD-RTO: 0 /100 WBC
PLATELET # BLD AUTO: 541 K/UL (ref 164–446)
PMV BLD AUTO: 8.8 FL (ref 9–12.9)
POTASSIUM SERPL-SCNC: 4.3 MMOL/L (ref 3.6–5.5)
PROT SERPL-MCNC: 7.1 G/DL (ref 6–8.2)
RBC # BLD AUTO: 4.04 M/UL (ref 4.2–5.4)
SIGNIFICANT IND 70042: ABNORMAL
SIGNIFICANT IND 70042: NORMAL
SITE SITE: ABNORMAL
SITE SITE: NORMAL
SODIUM SERPL-SCNC: 136 MMOL/L (ref 135–145)
SOURCE SOURCE: ABNORMAL
SOURCE SOURCE: NORMAL
WBC # BLD AUTO: 7.1 K/UL (ref 4.8–10.8)

## 2019-04-19 PROCEDURE — 770006 HCHG ROOM/CARE - MED/SURG/GYN SEMI*

## 2019-04-19 PROCEDURE — 700105 HCHG RX REV CODE 258: Performed by: INTERNAL MEDICINE

## 2019-04-19 PROCEDURE — 85025 COMPLETE CBC W/AUTO DIFF WBC: CPT

## 2019-04-19 PROCEDURE — 700111 HCHG RX REV CODE 636 W/ 250 OVERRIDE (IP): Performed by: SURGERY

## 2019-04-19 PROCEDURE — 36415 COLL VENOUS BLD VENIPUNCTURE: CPT

## 2019-04-19 PROCEDURE — 700102 HCHG RX REV CODE 250 W/ 637 OVERRIDE(OP): Performed by: SURGERY

## 2019-04-19 PROCEDURE — 80053 COMPREHEN METABOLIC PANEL: CPT

## 2019-04-19 PROCEDURE — 700111 HCHG RX REV CODE 636 W/ 250 OVERRIDE (IP): Performed by: INTERNAL MEDICINE

## 2019-04-19 PROCEDURE — A9270 NON-COVERED ITEM OR SERVICE: HCPCS | Performed by: SURGERY

## 2019-04-19 RX ADMIN — PIPERACILLIN AND TAZOBACTAM 4.5 G: 4; .5 INJECTION, POWDER, LYOPHILIZED, FOR SOLUTION INTRAVENOUS; PARENTERAL at 05:24

## 2019-04-19 RX ADMIN — SPIRONOLACTONE 25 MG: 50 TABLET ORAL at 05:23

## 2019-04-19 RX ADMIN — OXYCODONE HYDROCHLORIDE AND ACETAMINOPHEN 2 TABLET: 5; 325 TABLET ORAL at 23:14

## 2019-04-19 RX ADMIN — PIPERACILLIN AND TAZOBACTAM 4.5 G: 4; .5 INJECTION, POWDER, LYOPHILIZED, FOR SOLUTION INTRAVENOUS; PARENTERAL at 13:51

## 2019-04-19 RX ADMIN — MORPHINE SULFATE 2 MG: 4 INJECTION INTRAVENOUS at 05:23

## 2019-04-19 RX ADMIN — OXYCODONE HYDROCHLORIDE AND ACETAMINOPHEN 2 TABLET: 5; 325 TABLET ORAL at 02:10

## 2019-04-19 RX ADMIN — DILTIAZEM HYDROCHLORIDE 240 MG: 120 CAPSULE, COATED, EXTENDED RELEASE ORAL at 05:23

## 2019-04-19 RX ADMIN — PIPERACILLIN AND TAZOBACTAM 4.5 G: 4; .5 INJECTION, POWDER, LYOPHILIZED, FOR SOLUTION INTRAVENOUS; PARENTERAL at 22:47

## 2019-04-19 RX ADMIN — OMEPRAZOLE 20 MG: 20 CAPSULE, DELAYED RELEASE ORAL at 05:23

## 2019-04-19 RX ADMIN — OXYCODONE HYDROCHLORIDE AND ACETAMINOPHEN 2 TABLET: 5; 325 TABLET ORAL at 10:57

## 2019-04-19 RX ADMIN — OXYCODONE HYDROCHLORIDE AND ACETAMINOPHEN 2 TABLET: 5; 325 TABLET ORAL at 19:10

## 2019-04-19 ASSESSMENT — ENCOUNTER SYMPTOMS
HEADACHES: 0
FEVER: 0
CHILLS: 0
NAUSEA: 0
MYALGIAS: 0
SORE THROAT: 1
COUGH: 0
DIZZINESS: 0
VOMITING: 0
SHORTNESS OF BREATH: 0
DIAPHORESIS: 0
DIARRHEA: 0

## 2019-04-19 NOTE — DISCHARGE PLANNING
Anticipated Discharge Disposition: TBD    Action: KAYY received order from Dr. Marmolejo for out pt wound care.  KAYY faxed it to [facility].     Pt's wound vac has been delivered and in the room.     KAYY spoke to Dr. Marmolejo, and per ID, pt won't be able to go until Monday.  ABX is still pending.     Barriers to Discharge: ABX and wound care appointment    Plan: F/U on Monday on pt's abx plan.

## 2019-04-19 NOTE — PROGRESS NOTES
Update received from NOC shift RN.   A/O X 4. Room air.   VSS.     Labs reviewed.   GFR up to 56.     Wound cultures:   E-Coli  Klebsiella Pneumoniae  Group D Enterococcus    Wound vac in place over right upper chest and axilla. Seal is patent. WV set to 125mmHg. 50mL of serosanguinous output in reservoir.   Pt reports 5/10 pain level but denies pain medicatin at this time.     20g PIV on right FA running 4hr ABX.     Pt ambulates with standby assist from .     Portable wound vac ordered.     Awaiting more culture reports for ABX.     Per CW dc more likely for Monday.     Call light and  at bedside.

## 2019-04-19 NOTE — PROGRESS NOTES
Surgical Progress Note    Author: Jorgito Marmolejo Date & Time created: 2019   8:33 AM     Interval Events:  Wound vac placed yesterday, now set up for wound vac changes at [facility]  Seen by COLLETTE Waller yesterday    Review of Systems   Constitutional: Negative for fever.   Gastrointestinal: Negative for nausea and vomiting.     Hemodynamics:  Temp (24hrs), Av.6 °C (97.8 °F), Min:36.1 °C (97 °F), Max:37 °C (98.6 °F)  Temperature: 36.1 °C (97 °F)  Pulse  Av.3  Min: 68  Max: 99   Blood Pressure: 101/65     Respiratory:    Respiration: 18, Pulse Oximetry: 93 %           Neuro:  GCS = 15       Fluids:    Intake/Output Summary (Last 24 hours) at 19 0833  Last data filed at 19 0745   Gross per 24 hour   Intake              830 ml   Output             2050 ml   Net            -1220 ml        Current Diet Order   Procedures   • Diet Order Regular     Physical Exam   Constitutional: She is oriented to person, place, and time. She appears well-developed and well-nourished. No distress.   HENT:   Head: Normocephalic.   Eyes: Pupils are equal, round, and reactive to light. No scleral icterus.   Cardiovascular: Normal rate, regular rhythm and normal heart sounds.    Pulmonary/Chest: Effort normal and breath sounds normal. No respiratory distress.   Wound vac in place, right chest.  No surrounding erythema there, and no sign of infection ant left mastectomy site.  Wound vac with good seal and suction   Abdominal: Soft. Bowel sounds are normal. There is no tenderness.   Neurological: She is alert and oriented to person, place, and time.   Skin: Skin is warm and dry.   Psychiatric: She has a normal mood and affect. Her behavior is normal.     Labs:  Recent Results (from the past 24 hour(s))   CBC WITH DIFFERENTIAL    Collection Time: 19  4:04 AM   Result Value Ref Range    WBC 7.1 4.8 - 10.8 K/uL    RBC 4.04 (L) 4.20 - 5.40 M/uL    Hemoglobin 11.2 (L) 12.0 - 16.0 g/dL    Hematocrit 36.0 (L)  37.0 - 47.0 %    MCV 89.1 81.4 - 97.8 fL    MCH 27.7 27.0 - 33.0 pg    MCHC 31.1 (L) 33.6 - 35.0 g/dL    RDW 46.7 35.9 - 50.0 fL    Platelet Count 541 (H) 164 - 446 K/uL    MPV 8.8 (L) 9.0 - 12.9 fL    Neutrophils-Polys 83.20 (H) 44.00 - 72.00 %    Lymphocytes 9.00 (L) 22.00 - 41.00 %    Monocytes 5.90 0.00 - 13.40 %    Eosinophils 0.00 0.00 - 6.90 %    Basophils 0.40 0.00 - 1.80 %    Immature Granulocytes 1.50 (H) 0.00 - 0.90 %    Nucleated RBC 0.00 /100 WBC    Neutrophils (Absolute) 5.93 2.00 - 7.15 K/uL    Lymphs (Absolute) 0.64 (L) 1.00 - 4.80 K/uL    Monos (Absolute) 0.42 0.00 - 0.85 K/uL    Eos (Absolute) 0.00 0.00 - 0.51 K/uL    Baso (Absolute) 0.03 0.00 - 0.12 K/uL    Immature Granulocytes (abs) 0.11 0.00 - 0.11 K/uL    NRBC (Absolute) 0.00 K/uL   Comp Metabolic Panel    Collection Time: 04/19/19  4:04 AM   Result Value Ref Range    Sodium 136 135 - 145 mmol/L    Potassium 4.3 3.6 - 5.5 mmol/L    Chloride 101 96 - 112 mmol/L    Co2 27 20 - 33 mmol/L    Anion Gap 8.0 0.0 - 11.9    Glucose 165 (H) 65 - 99 mg/dL    Bun 11 8 - 22 mg/dL    Creatinine 1.00 0.50 - 1.40 mg/dL    Calcium 9.1 8.5 - 10.5 mg/dL    AST(SGOT) 22 12 - 45 U/L    ALT(SGPT) 37 2 - 50 U/L    Alkaline Phosphatase 101 (H) 30 - 99 U/L    Total Bilirubin 0.3 0.1 - 1.5 mg/dL    Albumin 3.2 3.2 - 4.9 g/dL    Total Protein 7.1 6.0 - 8.2 g/dL    Globulin 3.9 (H) 1.9 - 3.5 g/dL    A-G Ratio 0.8 g/dL   ESTIMATED GFR    Collection Time: 04/19/19  4:04 AM   Result Value Ref Range    GFR If African American >60 >60 mL/min/1.73 m 2    GFR If Non  56 (A) >60 mL/min/1.73 m 2     Medical Decision Making, by Problem:  Post op wound infection, right mastectomy site - POD #3, status post drainage and debridement of right mastectomy site.  She is doing well, and is set up for wound vac care and changes in [place].  Will await ID final recommendations about antibiotics when discharged.   Immunoglobulin deficiency   GERD   HTN      Plan:  Follow up ID  recommendations today.  Possible home later today    Quality Measures:  Quality-Core Measures   Reviewed items::  Labs reviewed and Medications reviewed  Carlin catheter::  No Carlin  DVT prophylaxis pharmacological::  Not indicated at this time, ambulatory  DVT prophylaxis - mechanical:  SCDs  Ulcer Prophylaxis::  Yes  Antibiotics:  Treating active infection/contamination beyond 24 hours perioperative coverage      Discussed patient condition with RN and Patient

## 2019-04-19 NOTE — PROGRESS NOTES
Infectious Disease Progress Note    Author: DANIELA Starks Date & Time created: 4/19/2019  12:29 PM    Interval History:  Rx: Piperacillin-tazobactam, day #1.  Previously ampicillin-sulbactam 4/16-4/18.  POD #3  Feeling much better. No nausea or diarrhea. Right chest wall is sore.  Afebrile since admission. WBC 7100.  Culture of abscess has grown a Klebsiella pneumoniae resistant only to ampicillin, pan sensitive E.coli, and ampicillin sensitive E.faecalis.   Labs Reviewed, Medications Reviewed, Wound Reviewed and Fluids Reviewed    Review of Systems:  Review of Systems   Constitutional: Negative for chills, diaphoresis, fever and malaise/fatigue.   HENT: Positive for sore throat.    Respiratory: Negative for cough and shortness of breath.    Cardiovascular: Positive for chest pain.   Gastrointestinal: Negative for diarrhea, nausea and vomiting.   Genitourinary: Negative for dysuria.   Musculoskeletal: Negative for myalgias.   Skin: Negative for rash.   Neurological: Negative for dizziness and headaches.     Physical Exam:  Physical Exam   Constitutional: She is oriented to person, place, and time. She appears well-developed and well-nourished. No distress.   HENT:   No temporal wasting or alopecia. No malar or other facial rash. No conjunctival injection or petechiae. No intraoral ulcers, nodules or thrush. No cervical lymphadenopathy or JVD.   Cardiovascular: Normal rate and regular rhythm.  Exam reveals no gallop.    No murmur heard.  Pulmonary/Chest: Effort normal and breath sounds normal. No respiratory distress. She has no wheezes. She has no rales.   VAC in right chest mastectomy wound with surrounding tenderness. Left chest mastectomy wound intact, crusted with no erythema or discharge.   Abdominal: Soft. Bowel sounds are normal. She exhibits no distension. There is no tenderness.   Musculoskeletal: She exhibits no edema.   Neurological: She is alert and oriented to person, place, and time.   Skin: Skin is  warm and dry. No rash noted. She is not diaphoretic.   Psychiatric: She has a normal mood and affect.   Nursing note and vitals reviewed.    Labs:  Recent Results (from the past 24 hour(s))   CBC WITH DIFFERENTIAL    Collection Time: 04/19/19  4:04 AM   Result Value Ref Range    WBC 7.1 4.8 - 10.8 K/uL    RBC 4.04 (L) 4.20 - 5.40 M/uL    Hemoglobin 11.2 (L) 12.0 - 16.0 g/dL    Hematocrit 36.0 (L) 37.0 - 47.0 %    MCV 89.1 81.4 - 97.8 fL    MCH 27.7 27.0 - 33.0 pg    MCHC 31.1 (L) 33.6 - 35.0 g/dL    RDW 46.7 35.9 - 50.0 fL    Platelet Count 541 (H) 164 - 446 K/uL    MPV 8.8 (L) 9.0 - 12.9 fL    Neutrophils-Polys 83.20 (H) 44.00 - 72.00 %    Lymphocytes 9.00 (L) 22.00 - 41.00 %    Monocytes 5.90 0.00 - 13.40 %    Eosinophils 0.00 0.00 - 6.90 %    Basophils 0.40 0.00 - 1.80 %    Immature Granulocytes 1.50 (H) 0.00 - 0.90 %    Nucleated RBC 0.00 /100 WBC    Neutrophils (Absolute) 5.93 2.00 - 7.15 K/uL    Lymphs (Absolute) 0.64 (L) 1.00 - 4.80 K/uL    Monos (Absolute) 0.42 0.00 - 0.85 K/uL    Eos (Absolute) 0.00 0.00 - 0.51 K/uL    Baso (Absolute) 0.03 0.00 - 0.12 K/uL    Immature Granulocytes (abs) 0.11 0.00 - 0.11 K/uL    NRBC (Absolute) 0.00 K/uL   Comp Metabolic Panel    Collection Time: 04/19/19  4:04 AM   Result Value Ref Range    Sodium 136 135 - 145 mmol/L    Potassium 4.3 3.6 - 5.5 mmol/L    Chloride 101 96 - 112 mmol/L    Co2 27 20 - 33 mmol/L    Anion Gap 8.0 0.0 - 11.9    Glucose 165 (H) 65 - 99 mg/dL    Bun 11 8 - 22 mg/dL    Creatinine 1.00 0.50 - 1.40 mg/dL    Calcium 9.1 8.5 - 10.5 mg/dL    AST(SGOT) 22 12 - 45 U/L    ALT(SGPT) 37 2 - 50 U/L    Alkaline Phosphatase 101 (H) 30 - 99 U/L    Total Bilirubin 0.3 0.1 - 1.5 mg/dL    Albumin 3.2 3.2 - 4.9 g/dL    Total Protein 7.1 6.0 - 8.2 g/dL    Globulin 3.9 (H) 1.9 - 3.5 g/dL    A-G Ratio 0.8 g/dL   ESTIMATED GFR    Collection Time: 04/19/19  4:04 AM   Result Value Ref Range    GFR If African American >60 >60 mL/min/1.73 m 2    GFR If Non  56  (A) >60 mL/min/1.73 m 2     Results     Procedure Component Value Units Date/Time    CULTURE WOUND W/ GRAM STAIN [[id number]]  (Abnormal)  (Susceptibility) Collected:  04/16/19 1626    Order Status:  Completed Specimen:  Wound Updated:  04/19/19 1019     Significant Indicator POS (POS)     Source WND     Site right mastectomy     Culture Result Wound - (A)     Gram Stain Result Many WBCs.  Rare Gram negative rods.       Culture Result Wound Escherichia coli  Light growth      Klebsiella pneumoniae  Light growth      Enterococcus faecalis  Light growth    Culture & Susceptibility     ENTEROCOCCUS FAECALIS     Antibiotic Sensitivity Microscan Unit Status    Ampicillin Sensitive <=2 mcg/mL Final    Method: RICKY    Daptomycin Sensitive 2 mcg/mL Final    Method: RICKY    Gent Synergy Sensitive <=500 mcg/mL Final    Method: RICKY    Penicillin Sensitive 2 mcg/mL Final    Method: RICKY    Vancomycin Sensitive 2 mcg/mL Final    Method: RICKY              ESCHERICHIA COLI     Antibiotic Sensitivity Microscan Unit Status    Ampicillin Sensitive <=8 mcg/mL Final    Method: RICKY    Cefepime Sensitive <=8 mcg/mL Final    Method: RICKY    Cefotaxime Sensitive <=2 mcg/mL Final    Method: RICKY    Cefotetan Sensitive <=16 mcg/mL Final    Method: RICKY    Ceftazidime Sensitive <=1 mcg/mL Final    Method: RICKY    Ceftriaxone Sensitive <=8 mcg/mL Final    Method: RICKY    Cefuroxime Sensitive <=4 mcg/mL Final    Method: RICKY    Ciprofloxacin Sensitive <=1 mcg/mL Final    Method: RICKY    Ertapenem Sensitive <=1 mcg/mL Final    Method: RICKY    Gentamicin Sensitive <=4 mcg/mL Final    Method: RICKY    Pip/Tazobactam Sensitive <=16 mcg/mL Final    Method: RICKY    Tigecycline Sensitive <=2 mcg/mL Final    Method: RICKY    Tobramycin Sensitive <=4 mcg/mL Final    Method: RICKY    Trimeth/Sulfa Sensitive <=2/38 mcg/mL Final    Method: RICKY              KLEBSIELLA PNEUMONIAE     Antibiotic Sensitivity Microscan Unit Status    Ampicillin Resistant >16 mcg/mL Final     Method: RICKY    Cefepime Sensitive <=8 mcg/mL Final    Method: RICKY    Cefotaxime Sensitive <=2 mcg/mL Final    Method: RICKY    Cefotetan Sensitive <=16 mcg/mL Final    Method: RICKY    Ceftazidime Sensitive <=1 mcg/mL Final    Method: RICKY    Ceftriaxone Sensitive <=8 mcg/mL Final    Method: RICKY    Cefuroxime Sensitive <=4 mcg/mL Final    Method: RICKY    Ciprofloxacin Sensitive <=1 mcg/mL Final    Method: RICKY    Ertapenem Sensitive <=1 mcg/mL Final    Method: RICKY    Gentamicin Sensitive <=4 mcg/mL Final    Method: RICKY    Pip/Tazobactam Sensitive <=16 mcg/mL Final    Method: RICKY    Tigecycline Sensitive <=2 mcg/mL Final    Method: RICKY    Tobramycin Sensitive <=4 mcg/mL Final    Method: RICKY    Trimeth/Sulfa Sensitive <=2/38 mcg/mL Final    Method: RICKY                       Anaerobic Culture [[id number]] Collected:  19    Order Status:  Completed Specimen:  Wound Updated:  19 1019     Significant Indicator NEG     Source WND     Site right mastectomy     Anaerobic Culture, Culture Res Culture in progress.    GRAM STAIN [[id number]] Collected:  19    Order Status:  Completed Specimen:  Wound Updated:  19 0023     Significant Indicator .     Source WND     Site right mastectomy     Gram Stain Result Many WBCs.  Rare Gram negative rods.          Hemodynamics:  Temp (24hrs), Av.5 °C (97.7 °F), Min:36.1 °C (97 °F), Max:36.9 °C (98.5 °F)  Temperature: 36.1 °C (97 °F)  Pulse  Av.3  Min: 68  Max: 99   Blood Pressure: 101/65     Peripheral IV 19 20 G Right Hand (Active)   Site Assessment Clean;Dry;Intact 2019  8:35 AM     Wound:  Wound 19 Full Thickness Wound Breast right, complicated open surgical (Active)   Wound Image   2019 11:35 AM   Site Assessment DEVAN 2019  8:32 AM   Rin-wound Assessment DEVAN 2019  8:32 AM   Margins DEVAN 2019  8:32 AM   Wound Length (cm) 4 cm 2019 11:35 AM   Wound Width (cm) 17.5 cm 2019 11:35 AM   Wound Depth (cm) 4 cm  4/18/2019 11:35 AM        Fluids:  Intake/Output       04/17/19 0700 - 04/18/19 0659 04/18/19 0700 - 04/19/19 0659 04/19/19 0700 - 04/20/19 0659      9074-5819 9960-4938 Total 8980-2057 1831-4696 Total 3851-6176 2491-6522 Total       Intake    P.O.  1120  600 1720  360  470 830  240  -- 240    I.V.  500  -- 500  --  -- --  --  -- --    IV Piggyback  220  -- 220  --  -- --  --  -- --    Total Intake 5913 214 1948 360 470 830 240 -- 240       Output    Urine  1050  800 1850  750  1100 1850  400  -- 400    Total Output 1894 246 4728 750 1150 1900 450 -- 450       Net I/O     790 -200 590 -390 -680 -1070 -210 -- -210      Medications:  Current Facility-Administered Medications   Medication Last Dose   • piperacillin-tazobactam (ZOSYN) 4.5 g in  mL IVPB Stopped at 04/19/19 0924   • DILTIAZem CD (CARDIZEM CD) capsule 240 mg 240 mg at 04/19/19 0523   • omeprazole (PRILOSEC) capsule 20 mg 20 mg at 04/19/19 0523   • spironolactone (ALDACTONE) tablet 25 mg 25 mg at 04/19/19 0523     Medical Decision Making, by Problem:  IMPRESSION:  1.  Right-sided breast abscess following mastectomy, acute polymicrobial with   E. coli, Klebsiella pneumoniae, and group D enterococcus.  Further identification is   pending.  2.  Invasive ductal carcinoma of the left breast, status post bilateral   mastectomies.  3.  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  4.  Gastroesophageal reflux disease.  5.  Hypertension.    Plan:  1. Continue IV piperacillin-tazobactam over the weekend and decide on Monday whether she should go home on   Augmentin plus cefdinir or Augmentin plus IV ceftriaxone at the [facility] ER/infusion center.  2. Monitor wound.  Discussed with patient, her spouse, Dr. Marmolejo, and RN.  >35 minutes, >50% in direct care and coordination.

## 2019-04-19 NOTE — PROGRESS NOTES
"Pt A&O x 4.     Vitals: /75   Pulse 76   Temp 36.1 °C (97 °F) (Temporal)   Resp 18   Ht 1.6 m (5' 3\")   Wt 97.1 kg (214 lb 1.1 oz)   LMP  (LMP Unknown)   SpO2 94%   Breastfeeding? No   BMI 37.92 kg/m²      Pt rates pain 6 out of 10. Medicated per MAR.      Neuro: CALDWELL. Denies new onset of numbness/ tingling.     Cardiac: Denies new onset of chest pain.     Vascular: Pulses 2+ BUE, BLE. No edema noted.     Respiratory: Lungs sound clear/diminished to auscultation. On room air.  on, satting in 90's. Denies SOB.     GI: Abdomen soft, non-tender. Normoactive bowel sounds, + flatus, + BM. Denies nausea/ vomiting.     : Pt voiding adequately.      MSK: Pt up to bathroom up self, tolerating well.     Integumentary: Wound vac to R breast, no leaks noted, scant drainage.     Labs noted.     Fall precautions in place: Bed locked in lowest position, Upper bed rails up, treaded socks in place, personal belongings within reach, call light within reach, appropriate mobility signs in place, - bed alarm. Pt calls appropriately.      Pt updated on POC.    "

## 2019-04-20 PROCEDURE — A9270 NON-COVERED ITEM OR SERVICE: HCPCS | Performed by: SURGERY

## 2019-04-20 PROCEDURE — 770006 HCHG ROOM/CARE - MED/SURG/GYN SEMI*

## 2019-04-20 PROCEDURE — 97606 NEG PRS WND THER DME>50 SQCM: CPT

## 2019-04-20 PROCEDURE — 700111 HCHG RX REV CODE 636 W/ 250 OVERRIDE (IP): Performed by: INTERNAL MEDICINE

## 2019-04-20 PROCEDURE — 700102 HCHG RX REV CODE 250 W/ 637 OVERRIDE(OP): Performed by: SURGERY

## 2019-04-20 PROCEDURE — 700105 HCHG RX REV CODE 258

## 2019-04-20 PROCEDURE — 700105 HCHG RX REV CODE 258: Performed by: INTERNAL MEDICINE

## 2019-04-20 PROCEDURE — 700111 HCHG RX REV CODE 636 W/ 250 OVERRIDE (IP): Performed by: SURGERY

## 2019-04-20 RX ORDER — SODIUM CHLORIDE 9 MG/ML
INJECTION, SOLUTION INTRAVENOUS
Status: COMPLETED
Start: 2019-04-20 | End: 2019-04-20

## 2019-04-20 RX ADMIN — PIPERACILLIN AND TAZOBACTAM 4.5 G: 4; .5 INJECTION, POWDER, LYOPHILIZED, FOR SOLUTION INTRAVENOUS; PARENTERAL at 12:22

## 2019-04-20 RX ADMIN — SODIUM CHLORIDE 500 ML: 9 INJECTION, SOLUTION INTRAVENOUS at 12:23

## 2019-04-20 RX ADMIN — OXYCODONE HYDROCHLORIDE AND ACETAMINOPHEN 2 TABLET: 5; 325 TABLET ORAL at 16:38

## 2019-04-20 RX ADMIN — OXYCODONE HYDROCHLORIDE AND ACETAMINOPHEN 2 TABLET: 5; 325 TABLET ORAL at 06:11

## 2019-04-20 RX ADMIN — MORPHINE SULFATE 4 MG: 4 INJECTION INTRAVENOUS at 15:23

## 2019-04-20 RX ADMIN — PIPERACILLIN AND TAZOBACTAM 4.5 G: 4; .5 INJECTION, POWDER, LYOPHILIZED, FOR SOLUTION INTRAVENOUS; PARENTERAL at 06:11

## 2019-04-20 RX ADMIN — PIPERACILLIN AND TAZOBACTAM 4.5 G: 4; .5 INJECTION, POWDER, LYOPHILIZED, FOR SOLUTION INTRAVENOUS; PARENTERAL at 20:52

## 2019-04-20 RX ADMIN — SPIRONOLACTONE 25 MG: 50 TABLET ORAL at 06:11

## 2019-04-20 RX ADMIN — DILTIAZEM HYDROCHLORIDE 240 MG: 120 CAPSULE, COATED, EXTENDED RELEASE ORAL at 06:11

## 2019-04-20 RX ADMIN — OXYCODONE HYDROCHLORIDE AND ACETAMINOPHEN 2 TABLET: 5; 325 TABLET ORAL at 12:21

## 2019-04-20 RX ADMIN — OMEPRAZOLE 20 MG: 20 CAPSULE, DELAYED RELEASE ORAL at 06:11

## 2019-04-20 ASSESSMENT — ENCOUNTER SYMPTOMS
SORE THROAT: 1
FEVER: 0
DIAPHORESIS: 0
DIARRHEA: 0
CHILLS: 0
VOMITING: 0
NAUSEA: 0
MYALGIAS: 0
DIZZINESS: 0
SHORTNESS OF BREATH: 0
COUGH: 0
HEADACHES: 0

## 2019-04-20 NOTE — PROGRESS NOTES
Infectious Disease Progress Note    Author: Vanessa Iraheta M.D. Date & Time created: 4/20/2019  2:44 PM    Interval History:  Rx: Piperacillin-tazobactam, day #2.  Previously ampicillin-sulbactam 4/16-4/18.  POD #4    4/20: VAC in place.  Can hear leak, but sponge still compressed.  Tried adding another piece of adhesive, but still can hear leak.  Wound care team due to change dressing today.  Tolerating IV therapy.    Culture of abscess has grown a Klebsiella pneumoniae resistant only to ampicillin, pan sensitive E.coli, and ampicillin sensitive E.faecalis.   Labs Reviewed, Medications Reviewed, Wound Reviewed and Fluids Reviewed    Review of Systems:  Review of Systems   Constitutional: Negative for chills, diaphoresis, fever and malaise/fatigue.   HENT: Positive for sore throat.    Respiratory: Negative for cough and shortness of breath.    Cardiovascular: Positive for chest pain.   Gastrointestinal: Negative for diarrhea, nausea and vomiting.   Genitourinary: Negative for dysuria.   Musculoskeletal: Negative for myalgias.   Skin: Negative for rash.   Neurological: Negative for dizziness and headaches.     Physical Exam:  Physical Exam   Constitutional: She is oriented to person, place, and time. She appears well-developed and well-nourished. No distress.   Cardiovascular: Normal rate and regular rhythm.  Exam reveals no gallop.    No murmur heard.  Pulmonary/Chest: Effort normal and breath sounds normal. No respiratory distress. She has no wheezes. She has no rales.   VAC in right chest mastectomy wound with surrounding tenderness.  Can hear whistling around VAC track pad. Left chest mastectomy wound intact, crusted with no erythema or discharge.   Abdominal: Soft. Bowel sounds are normal. She exhibits no distension. There is no tenderness.   Musculoskeletal: She exhibits no edema.   Neurological: She is alert and oriented to person, place, and time.   Skin: Skin is warm and dry. No rash noted. She is not  diaphoretic.   Psychiatric: She has a normal mood and affect.   Nursing note and vitals reviewed.    Labs:  No results found for this or any previous visit (from the past 24 hour(s)).  Results     Procedure Component Value Units Date/Time    CULTURE WOUND W/ GRAM STAIN [[id number]]  (Abnormal)  (Susceptibility) Collected:  04/16/19 1626    Order Status:  Completed Specimen:  Wound Updated:  04/19/19 1019     Significant Indicator POS (POS)     Source WND     Site right mastectomy     Culture Result Wound - (A)     Gram Stain Result Many WBCs.  Rare Gram negative rods.       Culture Result Wound Escherichia coli  Light growth      Klebsiella pneumoniae  Light growth      Enterococcus faecalis  Light growth    Culture & Susceptibility     ENTEROCOCCUS FAECALIS     Antibiotic Sensitivity Microscan Unit Status    Ampicillin Sensitive <=2 mcg/mL Final    Method: RICKY    Daptomycin Sensitive 2 mcg/mL Final    Method: RICKY    Gent Synergy Sensitive <=500 mcg/mL Final    Method: RICKY    Penicillin Sensitive 2 mcg/mL Final    Method: RICKY    Vancomycin Sensitive 2 mcg/mL Final    Method: RICKY              ESCHERICHIA COLI     Antibiotic Sensitivity Microscan Unit Status    Ampicillin Sensitive <=8 mcg/mL Final    Method: RICKY    Cefepime Sensitive <=8 mcg/mL Final    Method: RICKY    Cefotaxime Sensitive <=2 mcg/mL Final    Method: RICKY    Cefotetan Sensitive <=16 mcg/mL Final    Method: RICKY    Ceftazidime Sensitive <=1 mcg/mL Final    Method: RICKY    Ceftriaxone Sensitive <=8 mcg/mL Final    Method: RICKY    Cefuroxime Sensitive <=4 mcg/mL Final    Method: RICKY    Ciprofloxacin Sensitive <=1 mcg/mL Final    Method: RICKY    Ertapenem Sensitive <=1 mcg/mL Final    Method: RICKY    Gentamicin Sensitive <=4 mcg/mL Final    Method: RICKY    Pip/Tazobactam Sensitive <=16 mcg/mL Final    Method: RICKY    Tigecycline Sensitive <=2 mcg/mL Final    Method: RICKY    Tobramycin Sensitive <=4 mcg/mL Final    Method: RICKY    Trimeth/Sulfa Sensitive <=2/38  mcg/mL Final    Method: RICKY              KLEBSIELLA PNEUMONIAE     Antibiotic Sensitivity Microscan Unit Status    Ampicillin Resistant >16 mcg/mL Final    Method: RICKY    Cefepime Sensitive <=8 mcg/mL Final    Method: RICKY    Cefotaxime Sensitive <=2 mcg/mL Final    Method: RICKY    Cefotetan Sensitive <=16 mcg/mL Final    Method: RICKY    Ceftazidime Sensitive <=1 mcg/mL Final    Method: RICKY    Ceftriaxone Sensitive <=8 mcg/mL Final    Method: RICKY    Cefuroxime Sensitive <=4 mcg/mL Final    Method: RICKY    Ciprofloxacin Sensitive <=1 mcg/mL Final    Method: RICKY    Ertapenem Sensitive <=1 mcg/mL Final    Method: RICKY    Gentamicin Sensitive <=4 mcg/mL Final    Method: RICKY    Pip/Tazobactam Sensitive <=16 mcg/mL Final    Method: RICKY    Tigecycline Sensitive <=2 mcg/mL Final    Method: RICKY    Tobramycin Sensitive <=4 mcg/mL Final    Method: RICKY    Trimeth/Sulfa Sensitive <=2/38 mcg/mL Final    Method: RICKY                       Anaerobic Culture [[id number]] Collected:  19    Order Status:  Completed Specimen:  Wound Updated:  19 1019     Significant Indicator NEG     Source WND     Site right mastectomy     Anaerobic Culture, Culture Res Culture in progress.    GRAM STAIN [[id number]] Collected:  19    Order Status:  Completed Specimen:  Wound Updated:  19 0023     Significant Indicator .     Source WND     Site right mastectomy     Gram Stain Result Many WBCs.  Rare Gram negative rods.          Hemodynamics:  Temp (24hrs), Av.6 °C (97.8 °F), Min:36.3 °C (97.3 °F), Max:36.7 °C (98.1 °F)  Temperature: 36.5 °C (97.7 °F)  Pulse  Av.4  Min: 63  Max: 99   Blood Pressure: 146/76     Peripheral IV 19 20 G Right Hand (Active)   Site Assessment Clean;Dry;Intact 2019  8:35 AM     Wound:  Wound 19 Full Thickness Wound Breast right, complicated open surgical (Active)   Wound Image   2019 11:35 AM   Site Assessment DEVAN 2019  8:32 AM   Rin-wound Assessment DEVAN 2019   8:32 AM   Margins DEVAN 4/19/2019  8:32 AM   Wound Length (cm) 4 cm 4/18/2019 11:35 AM   Wound Width (cm) 17.5 cm 4/18/2019 11:35 AM   Wound Depth (cm) 4 cm 4/18/2019 11:35 AM        Fluids:  Intake/Output       04/17/19 0700 - 04/18/19 0659 04/18/19 0700 - 04/19/19 0659 04/19/19 0700 - 04/20/19 0659      [id number] 4524-6699 Total 4674-6379 8601-8427 Total 7485-4675 2878-3054 Total       Intake    P.O.  1120  600 1720  360  470 830  240  -- 240    I.V.  500  -- 500  --  -- --  --  -- --    IV Piggyback  220  -- 220  --  -- --  --  -- --    Total Intake 7470 064 3030 360 470 830 240 -- 240       Output    Urine  1050  800 1850  750  1100 1850  400  -- 400    Total Output 6419 635 8319 750 1150 1900 450 -- 450       Net I/O     790 -200 590 -390 -680 -1070 -210 -- -210      Medications:  Current Facility-Administered Medications   Medication Last Dose   • piperacillin-tazobactam (ZOSYN) 4.5 g in  mL IVPB Stopped at 04/19/19 0924   • DILTIAZem CD (CARDIZEM CD) capsule 240 mg 240 mg at 04/19/19 0523   • omeprazole (PRILOSEC) capsule 20 mg 20 mg at 04/19/19 0523   • spironolactone (ALDACTONE) tablet 25 mg 25 mg at 04/19/19 0523     Medical Decision Making, by Problem:  IMPRESSION:  1.  Right-sided breast abscess following mastectomy, acute polymicrobial with   E. coli, Klebsiella pneumoniae, and group D enterococcus.  Further identification is   pending.  2.  Invasive ductal carcinoma of the left breast, status post bilateral   mastectomies.  3.  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  4.  Gastroesophageal reflux disease.  5.  Hypertension.    Plan:  1. Continue IV piperacillin-tazobactam over the weekend and decide on Monday whether she should go home on   Augmentin plus cefdinir or Augmentin plus IV ceftriaxone at the [facility] ER/infusion center.  2. Monitor wound.  3. Wound VAC to be changed today.    Discussed with patient, her , and RN..  States she has appts  set up for oncology and plastic surgery next Wed and  Thursday.  She had an appt with my office to follow up her CVID.  She does not need this appt.  Do not think she will need follow up for abscess with my office.  She has FU with surgery already.  Think will likely be able to go home on oral therapy as she should have good blood flow to area.     >35 minutes, >50% in face to face contact coordinating care and counseling..

## 2019-04-20 NOTE — PROGRESS NOTES
Surgical Progress Note    Author: Jorgito aMrmolejo Date & Time created: 2019   11:21 AM     Interval Events:  No change, no new complaint.    Review of Systems   Gastrointestinal: Negative for nausea and vomiting.     Hemodynamics:  Temp (24hrs), Av.6 °C (97.8 °F), Min:36.3 °C (97.3 °F), Max:36.7 °C (98.1 °F)  Temperature: 36.5 °C (97.7 °F)  Pulse  Av.4  Min: 63  Max: 99   Blood Pressure: 146/76     Respiratory:    Respiration: 16, Pulse Oximetry: 95 %           Neuro:  GCS = 15       Fluids:    Intake/Output Summary (Last 24 hours) at 19 1121  Last data filed at 19 0800   Gross per 24 hour   Intake              540 ml   Output             1325 ml   Net             -785 ml        Current Diet Order   Procedures   • Diet Order Regular     Physical Exam   Constitutional: She is oriented to person, place, and time. She appears well-developed and well-nourished. No distress.   HENT:   Head: Normocephalic.   Eyes: Pupils are equal, round, and reactive to light. No scleral icterus.   Cardiovascular: Normal rate, regular rhythm and normal heart sounds.    Pulmonary/Chest: Effort normal and breath sounds normal. No respiratory distress.    Wound vac in place, right upper chest, with good seal and suction.  No erythema noted in left or right chest   Abdominal: Soft. Bowel sounds are normal. She exhibits no distension. There is no tenderness. There is no rebound.   Neurological: She is alert and oriented to person, place, and time.   Skin: Skin is warm and dry.   Psychiatric: She has a normal mood and affect. Her behavior is normal.     Labs:  No results found for this or any previous visit (from the past 24 hour(s)).  Medical Decision Making, by Problem:    Post op wound infection, right mastectomy site - POD #4, status post drainage and debridement of right mastectomy site.  She is doing well, and is set up for wound vac care and changes in [place].  Will await ID final recommendations about  antibiotics on Monday.   Immunoglobulin deficiency   GERD   HTN    Plan:  Continue IV antibiotics until Monday, reevaluate at that time.    Quality Measures:  Quality-Core Measures   Reviewed items::  Labs reviewed and Medications reviewed  Carlin catheter::  No Carlin  DVT prophylaxis pharmacological::  Not indicated at this time, ambulatory  Ulcer Prophylaxis::  Yes  Antibiotics:  Treating active infection/contamination beyond 24 hours perioperative coverage      Discussed patient condition with Family and Patient

## 2019-04-20 NOTE — PROGRESS NOTES
"Assumed care of patient from RN at 0700.     Reviewed VS, labs, orders, and notes.     Pt sitting up in bed with family member at bedside . A&Ox 4.    /76   Pulse 63   Temp 36.5 °C (97.7 °F) (Temporal)   Resp 16   Ht 1.6 m (5' 3\")   Wt 97.1 kg (214 lb 1.1 oz)   LMP  (LMP Unknown)   SpO2 95%   Breastfeeding? No   BMI 37.92 kg/m² . MEWS Score: 0.     On room air. Denies SOB.    Moves all extremities, denies numbness or tingling.     Skin assessed over bony prominences and under medical devices.     Voiding, hyperactive BS, + flatus, LBM 4/20.     Regular diet, tolerating well, denies nausea/ vomiting.     Wound(s): bilateral upper chest surgical incisions left side scabbed EFRAÍN; right side wound vac in place and operational.     Patient reports 4 /10 pain in right upper chest, declined medication.    Pt. is Up ad ykuo assist with steady gait.    Fall prevention education reinforced with pt. Pt is wearing treaded slipper socks, IV pole is on the same side as the bathroom, lower bedrails are down.    Discussed POC, all questions answered at this time. Bed is locked and in the lowest position, call light within reach, Q 1 hour rounding in place. All needs met at this time.   "

## 2019-04-21 PROCEDURE — 700111 HCHG RX REV CODE 636 W/ 250 OVERRIDE (IP): Performed by: INTERNAL MEDICINE

## 2019-04-21 PROCEDURE — 700102 HCHG RX REV CODE 250 W/ 637 OVERRIDE(OP): Performed by: SURGERY

## 2019-04-21 PROCEDURE — A9270 NON-COVERED ITEM OR SERVICE: HCPCS | Performed by: SURGERY

## 2019-04-21 PROCEDURE — 700105 HCHG RX REV CODE 258: Performed by: INTERNAL MEDICINE

## 2019-04-21 PROCEDURE — 770006 HCHG ROOM/CARE - MED/SURG/GYN SEMI*

## 2019-04-21 RX ADMIN — OXYCODONE HYDROCHLORIDE AND ACETAMINOPHEN 2 TABLET: 5; 325 TABLET ORAL at 20:08

## 2019-04-21 RX ADMIN — SPIRONOLACTONE 25 MG: 50 TABLET ORAL at 05:03

## 2019-04-21 RX ADMIN — PIPERACILLIN AND TAZOBACTAM 4.5 G: 4; .5 INJECTION, POWDER, LYOPHILIZED, FOR SOLUTION INTRAVENOUS; PARENTERAL at 12:52

## 2019-04-21 RX ADMIN — PIPERACILLIN AND TAZOBACTAM 4.5 G: 4; .5 INJECTION, POWDER, LYOPHILIZED, FOR SOLUTION INTRAVENOUS; PARENTERAL at 20:08

## 2019-04-21 RX ADMIN — OMEPRAZOLE 20 MG: 20 CAPSULE, DELAYED RELEASE ORAL at 05:04

## 2019-04-21 RX ADMIN — OXYCODONE HYDROCHLORIDE AND ACETAMINOPHEN 2 TABLET: 5; 325 TABLET ORAL at 03:28

## 2019-04-21 RX ADMIN — DILTIAZEM HYDROCHLORIDE 240 MG: 120 CAPSULE, COATED, EXTENDED RELEASE ORAL at 05:04

## 2019-04-21 RX ADMIN — OXYCODONE HYDROCHLORIDE AND ACETAMINOPHEN 2 TABLET: 5; 325 TABLET ORAL at 11:04

## 2019-04-21 RX ADMIN — PIPERACILLIN AND TAZOBACTAM 4.5 G: 4; .5 INJECTION, POWDER, LYOPHILIZED, FOR SOLUTION INTRAVENOUS; PARENTERAL at 05:04

## 2019-04-21 ASSESSMENT — ENCOUNTER SYMPTOMS
FEVER: 0
HEADACHES: 0
MYALGIAS: 0
DIAPHORESIS: 0
NAUSEA: 0
DIARRHEA: 0
CHILLS: 0
COUGH: 0
VOMITING: 0
DIZZINESS: 0
SORE THROAT: 1
SHORTNESS OF BREATH: 0

## 2019-04-21 NOTE — CARE PLAN
Problem: Knowledge Deficit  Goal: Knowledge of the prescribed therapeutic regimen will improve  Outcome: PROGRESSING AS EXPECTED  Medications explained prior to administration. Patient verbalizes understanding.    Problem: Fluid Volume:  Goal: Will maintain balanced intake and output  Outcome: PROGRESSING AS EXPECTED  Patient drinking fluids and having adequate urine output

## 2019-04-21 NOTE — PROGRESS NOTES
"Assumed care of patient from RN at 0700.      Reviewed VS, labs, orders, and notes.      Pt sitting up in bed with family member at bedside . A&Ox 4.     /77   Pulse 70   Temp 36.8 °C (98.2 °F) (Temporal)   Resp 16   Ht 1.6 m (5' 3\")   Wt 97.1 kg (214 lb 1.1 oz)   LMP  (LMP Unknown)   SpO2 95%   Breastfeeding? No   BMI 37.92 kg/m² . MEWS Score: 0.      On room air. Denies SOB.     Moves all extremities, denies numbness or tingling.      Skin assessed over bony prominences and under medical devices.      Voiding, normoactiveX4 BS, + flatus, LBM 4/20.      Regular diet, tolerating well, denies nausea/ vomiting.      Wound(s): bilateral upper chest surgical incisions left side scabbed EFRAÍN; right side wound vac in place and operational.      Patient reports 4 /10 pain in right upper chest, declined medication.     Pt. is up ad yuko assist with steady gait.     Fall prevention education reinforced with pt. Pt is wearing treaded slipper socks, IV pole is on the same side as the bathroom, lower bedrails are down.     Discussed POC, all questions answered at this time. Bed is locked and in the lowest position, call light within reach, Q 1 hour rounding in place. All needs met at this time.   "

## 2019-04-21 NOTE — WOUND TEAM
[facility]  Inpatient Services  Wound and Skin Care Progress Note    Admission Date:  4/16/2019   HPI, PMH, SH: Reviewed  Unit where seen by Wound Team: [id number]    WOUND TEAM FOLLOW UP:  VAC change     SUBJECTIVE:  States she wishes she can go home right now.       Self Report / Pain Level:  Premedicated IV by RN       OBJECTIVE:      WOUND TYPE, LOCATION, CHARACTERISTICS (Pressure ulcers: location, stage, POA or date identified)     Negative Pressure Wound Therapy Breast Right (Active)   NPWT Pump Mode / Pressure Setting 125 mmHg;Continuous    Dressing Type Medium;Black foam    Number of Foam Pieces Used 1    Canister Changed No      Wound 04/16/19 Full Thickness Wound Breast right, complicated open surgical (Active)   Wound Image   4/18/2019   Site Assessment Red    Rin-wound Assessment Intact    Margins Defined edges    Wound Length (cm) 4 cm Measured 4/18/2019   Wound Width (cm) 17.5 cm    Wound Depth (cm) 4 cm    Wound Surface Area (cm^2) 70 cm^2    Undermining 5 cm    Closure None    Drainage Amount Small    Drainage Description Serosanguineous    Non-staged Wound Description Full thickness    Treatments Cleansed    Cleansing Approved Wound Cleanser    Periwound Protectant Benzoin;Drape    Dressing Options Wound Vac    Dressing Changed Changed    Dressing Status Clean;Dry;Intact    Dressing Change Frequency Monday, Wednesday, Friday    NEXT Dressing Change  04/22/19    NEXT Weekly Photo (Inpatient Only) 04/24/19    WOUND NURSE ONLY - Odor None    WOUND NURSE ONLY - Exposed Structures None    WOUND NURSE ONLY - Tissue Type and Percentage red 90 yellow 10      Lab Values:    WBC:       WBC   Date/Time Value Ref Range Status   04/19/2019 04:04 AM 7.1 4.8 - 10.8 K/uL Final     AIC:    No results found for: HBA1C      Culture:   Completed 4/16/19    INTERVENTIONS BY WOUND TEAM:  Presoaked black foam with NS prior to removal. Cleaned wound with wound cleanser. Benzoin and drape to periwound skin.  Adpatic to wound bed. One black foam into wound bed and under the edges. VAC resumed at 125 mmHg continuous    Dressing selection:  VAC         Interdisciplinary consultation:  RN, patient      EVALUATION:  Wound beginning to granulate. Some yellow noted under undermining. Will be getting VAC changes done at hospital in [place] when she goes home. Plan for next change Monday prior to going home      Factors affecting wound healing:  Invasive ductal cancer, incisional infection  Goals:  Steady decrease in wound area and depth weekly     NURSING PLAN OF CARE ORDERS (X):    Dressing changes: See Dressing Care orders:  X   Skin care: See Skin Care orders:   Rectal tube care: See Rectal Tube Care orders:   Other orders:      WOUND TEAM PLAN OF CARE (X):   NPWT change 3 x week:   X     Dressing changes by wound team:       Follow up as needed:       Other (explain):    Anticipated discharge plans (X):  SNF:           Home Care:           Outpatient Wound Center:   [facility]         Self Care:            Other:

## 2019-04-21 NOTE — PROGRESS NOTES
Received bedside report and assumed care at 1900    Pt is A&O x4  Pain 3/10, declines intervention  Denies nausea  Tolerating regular diet  + Urine Output  + Flatus  + BM   Wound vac to right breast  Up self  Bed in lowest position and locked.  Reviewed plan of care with patient, pt resting comfortably now, call light within reach, all needs met at this time

## 2019-04-21 NOTE — PROGRESS NOTES
Surgical Progress Note    Author: Jorgito Marmolejo Date & Time created: 2019   10:41 AM     Interval Events:  One loose stool this AM.  Wound vac changed yesterday    ROS  Hemodynamics:  Temp (24hrs), Av.4 °C (97.5 °F), Min:35.9 °C (96.7 °F), Max:36.8 °C (98.2 °F)  Temperature: 36.4 °C (97.5 °F)  Pulse  Av.8  Min: 63  Max: 99   Blood Pressure: (!) 162/71 (RN notified)     Respiratory:    Respiration: 18, Pulse Oximetry: 96 %           Neuro:  GCS = 15       Fluids:    Intake/Output Summary (Last 24 hours) at 19 1041  Last data filed at 19 0800   Gross per 24 hour   Intake              560 ml   Output             1327 ml   Net             -767 ml        Current Diet Order   Procedures   • Diet Order Regular     Physical Exam   Constitutional: She is oriented to person, place, and time. She appears well-developed and well-nourished. No distress.   HENT:   Head: Normocephalic.   Eyes: Pupils are equal, round, and reactive to light.   Cardiovascular: Normal rate, regular rhythm and normal heart sounds.    Pulmonary/Chest: Effort normal and breath sounds normal. No respiratory distress.   Wound vac in place, right chest, with good seal.  No erythema or induration in right or left chest    Neurological: She is alert and oriented to person, place, and time.   Skin: Skin is warm and dry.   Psychiatric: She has a normal mood and affect. Her behavior is normal.     Labs:  No results found for this or any previous visit (from the past 24 hour(s)).  Medical Decision Making, by Problem:  Post op wound infection, right mastectomy site - POD #5, status post drainage and debridement of right mastectomy site.  She is doing well, and is set up for wound vac care and changes in [place].  Will await ID final recommendations about antibiotics on Monday.   Immunoglobulin deficiency   GERD   HTN  Plan:  Follow up frequency of loose stools. ID recommendations tomorrow.    Quality Measures:  Quality-Core Measures    Reviewed items::  Medications reviewed  Carlin catheter::  No Carlin  DVT prophylaxis pharmacological::  Not indicated at this time, ambulatory  Ulcer Prophylaxis::  Yes  Antibiotics:  Treating active infection/contamination beyond 24 hours perioperative coverage      Discussed patient condition with Family and Patient

## 2019-04-21 NOTE — PROGRESS NOTES
"Infectious Disease Progress Note    Author: Vanessa Iraheta M.D. Date & Time created: 4/21/2019  4:48 PM    Interval History:  Rx: Piperacillin-tazobactam, day #3  Previously ampicillin-sulbactam 4/16-4/18.  POD #5    4/20: VAC in place.  Can hear leak, but sponge still compressed.  Tried adding another piece of adhesive, but still can hear leak.  Wound care team due to change dressing today.  Tolerating IV therapy.  4/21: VAC changed yesterday.  She thinks it is \"whistling\" again.    Culture of abscess has grown a Klebsiella pneumoniae resistant only to ampicillin, pan sensitive E.coli, and ampicillin sensitive E.faecalis.   Labs Reviewed, Medications Reviewed, Wound Reviewed and Fluids Reviewed    Review of Systems:  Review of Systems   Constitutional: Negative for chills, diaphoresis, fever and malaise/fatigue.   HENT: Positive for sore throat.    Respiratory: Negative for cough and shortness of breath.    Cardiovascular: Positive for chest pain.   Gastrointestinal: Negative for diarrhea, nausea and vomiting.   Genitourinary: Negative for dysuria.   Musculoskeletal: Negative for myalgias.   Skin: Negative for rash.   Neurological: Negative for dizziness and headaches.     Physical Exam:  Physical Exam   Constitutional: She is oriented to person, place, and time. She appears well-developed and well-nourished. No distress.   Cardiovascular: Normal rate and regular rhythm.  Exam reveals no gallop.    No murmur heard.  Pulmonary/Chest: Effort normal and breath sounds normal. No respiratory distress. She has no wheezes. She has no rales.   VAC in right chest mastectomy wound with surrounding tenderness.  No new photos to review.  Wound care notes from 4/20 reviewed.   Abdominal: Soft. Bowel sounds are normal. She exhibits no distension. There is no tenderness.   Musculoskeletal: She exhibits no edema.   Neurological: She is alert and oriented to person, place, and time.   Skin: Skin is warm and dry. No rash noted. She " is not diaphoretic.   Psychiatric: She has a normal mood and affect.   Nursing note and vitals reviewed.    Labs:  No results found for this or any previous visit (from the past 24 hour(s)).  Results     Procedure Component Value Units Date/Time    CULTURE WOUND W/ GRAM STAIN [[id number]]  (Abnormal)  (Susceptibility) Collected:  04/16/19 1626    Order Status:  Completed Specimen:  Wound Updated:  04/19/19 1019     Significant Indicator POS (POS)     Source WND     Site right mastectomy     Culture Result Wound - (A)     Gram Stain Result Many WBCs.  Rare Gram negative rods.       Culture Result Wound Escherichia coli  Light growth      Klebsiella pneumoniae  Light growth      Enterococcus faecalis  Light growth    Culture & Susceptibility     ENTEROCOCCUS FAECALIS     Antibiotic Sensitivity Microscan Unit Status    Ampicillin Sensitive <=2 mcg/mL Final    Method: RICKY    Daptomycin Sensitive 2 mcg/mL Final    Method: RICKY    Gent Synergy Sensitive <=500 mcg/mL Final    Method: RICKY    Penicillin Sensitive 2 mcg/mL Final    Method: RICKY    Vancomycin Sensitive 2 mcg/mL Final    Method: RICKY              ESCHERICHIA COLI     Antibiotic Sensitivity Microscan Unit Status    Ampicillin Sensitive <=8 mcg/mL Final    Method: RICKY    Cefepime Sensitive <=8 mcg/mL Final    Method: RICKY    Cefotaxime Sensitive <=2 mcg/mL Final    Method: RICKY    Cefotetan Sensitive <=16 mcg/mL Final    Method: RICKY    Ceftazidime Sensitive <=1 mcg/mL Final    Method: RICKY    Ceftriaxone Sensitive <=8 mcg/mL Final    Method: RICKY    Cefuroxime Sensitive <=4 mcg/mL Final    Method: RICKY    Ciprofloxacin Sensitive <=1 mcg/mL Final    Method: RICKY    Ertapenem Sensitive <=1 mcg/mL Final    Method: RICKY    Gentamicin Sensitive <=4 mcg/mL Final    Method: RICKY    Pip/Tazobactam Sensitive <=16 mcg/mL Final    Method: RICKY    Tigecycline Sensitive <=2 mcg/mL Final    Method: RICKY    Tobramycin Sensitive <=4 mcg/mL Final    Method: RICKY    Trimeth/Sulfa Sensitive  <=2/38 mcg/mL Final    Method: RICKY              KLEBSIELLA PNEUMONIAE     Antibiotic Sensitivity Microscan Unit Status    Ampicillin Resistant >16 mcg/mL Final    Method: RICKY    Cefepime Sensitive <=8 mcg/mL Final    Method: RICKY    Cefotaxime Sensitive <=2 mcg/mL Final    Method: RICKY    Cefotetan Sensitive <=16 mcg/mL Final    Method: RICKY    Ceftazidime Sensitive <=1 mcg/mL Final    Method: RICKY    Ceftriaxone Sensitive <=8 mcg/mL Final    Method: RICKY    Cefuroxime Sensitive <=4 mcg/mL Final    Method: RICKY    Ciprofloxacin Sensitive <=1 mcg/mL Final    Method: RICKY    Ertapenem Sensitive <=1 mcg/mL Final    Method: RICKY    Gentamicin Sensitive <=4 mcg/mL Final    Method: RICKY    Pip/Tazobactam Sensitive <=16 mcg/mL Final    Method: RICKY    Tigecycline Sensitive <=2 mcg/mL Final    Method: RICKY    Tobramycin Sensitive <=4 mcg/mL Final    Method: RICKY    Trimeth/Sulfa Sensitive <=2/38 mcg/mL Final    Method: RICKY                       Anaerobic Culture [[id number]] Collected:  19    Order Status:  Completed Specimen:  Wound Updated:  19 1019     Significant Indicator NEG     Source WND     Site right mastectomy     Anaerobic Culture, Culture Res Culture in progress.    GRAM STAIN [[id number]] Collected:  19    Order Status:  Completed Specimen:  Wound Updated:  19 0023     Significant Indicator .     Source WND     Site right mastectomy     Gram Stain Result Many WBCs.  Rare Gram negative rods.          Hemodynamics:  Temp (24hrs), Av.4 °C (97.5 °F), Min:35.9 °C (96.7 °F), Max:36.8 °C (98.2 °F)  Temperature: 36.4 °C (97.5 °F)  Pulse  Av.8  Min: 63  Max: 99   Blood Pressure: (!) 162/71 (RN notified)     Peripheral IV 19 20 G Right Hand (Active)   Site Assessment Clean;Dry;Intact 2019  8:35 AM     Wound:  Wound 19 Full Thickness Wound Breast right, complicated open surgical (Active)   Wound Image   2019 11:35 AM   Site Assessment DEVAN 2019  8:32 AM   Rin-wound  Assessment DEVAN 4/19/2019  8:32 AM   Margins DEVAN 4/19/2019  8:32 AM   Wound Length (cm) 4 cm 4/18/2019 11:35 AM   Wound Width (cm) 17.5 cm 4/18/2019 11:35 AM   Wound Depth (cm) 4 cm 4/18/2019 11:35 AM        Fluids:  Intake/Output       04/17/19 0700 - 04/18/19 0659 04/18/19 0700 - 04/19/19 0659 04/19/19 0700 - 04/20/19 0659      2382-1962 6952-3031 Total 4345-2960 4299-6408 Total 2769-7374 4734-1005 Total       Intake    P.O.  1120  600 1720  360  470 830  240  -- 240    I.V.  500  -- 500  --  -- --  --  -- --    IV Piggyback  220  -- 220  --  -- --  --  -- --    Total Intake 0534 656 2013 360 470 830 240 -- 240       Output    Urine  1050  800 1850  750  1100 1850  400  -- 400    Total Output 7271 905 7430 750 1150 1900 450 -- 450       Net I/O     790 -200 590 -390 -680 -1070 -210 -- -210      Medications:  Current Facility-Administered Medications   Medication Last Dose   • piperacillin-tazobactam (ZOSYN) 4.5 g in  mL IVPB Stopped at 04/19/19 0924   • DILTIAZem CD (CARDIZEM CD) capsule 240 mg 240 mg at 04/19/19 0523   • omeprazole (PRILOSEC) capsule 20 mg 20 mg at 04/19/19 0523   • spironolactone (ALDACTONE) tablet 25 mg 25 mg at 04/19/19 0523     Medical Decision Making, by Problem:  IMPRESSION:  1.  Right-sided breast abscess following mastectomy, acute polymicrobial with   E. coli, Klebsiella pneumoniae, and group D enterococcus.  Further identification is   pending.  2.  Invasive ductal carcinoma of the left breast, status post bilateral   mastectomies.  3.  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  4.  Gastroesophageal reflux disease.  5.  Hypertension.    Plan:  1. Continue IV piperacillin-tazobactam over the weekend and decide tomorrow whether she should go home on   Augmentin plus cefdinir or Augmentin plus IV ceftriaxone at the [facility] ER/infusion center.  2. Monitor wound.  3. Wound VAC to be changed tomorrow.    Discussed with patient, and her  . Wound VAC to be changed tomorrow.  Would have wound care team take another photo/measuements.  I can see wound ~ 9:30 am.  If the wound continues to have good granulation tissue and is getting smaller, I anticipate she will be able to go home on the oral regimen: Augmentin 500 mg TID and Cefdinir 300 mg BID.  She is coming back for surgical follow ups in a few days, so if she fails oral therapy could then arrange outpt IV later.    Discussed with pt and her .  30 minutes, >50% in face to face contact coordinating care and counseling..

## 2019-04-22 VITALS
WEIGHT: 214.07 LBS | HEIGHT: 63 IN | HEART RATE: 66 BPM | BODY MASS INDEX: 37.93 KG/M2 | RESPIRATION RATE: 18 BRPM | TEMPERATURE: 97.8 F | SYSTOLIC BLOOD PRESSURE: 143 MMHG | DIASTOLIC BLOOD PRESSURE: 90 MMHG | OXYGEN SATURATION: 96 %

## 2019-04-22 PROBLEM — T81.49XA WOUND INFECTION AFTER SURGERY: Status: ACTIVE | Noted: 2019-04-22

## 2019-04-22 LAB
ALBUMIN SERPL BCP-MCNC: 3.1 G/DL (ref 3.2–4.9)
ALBUMIN/GLOB SERPL: 1 G/DL
ALP SERPL-CCNC: 87 U/L (ref 30–99)
ALT SERPL-CCNC: 29 U/L (ref 2–50)
ANION GAP SERPL CALC-SCNC: 7 MMOL/L (ref 0–11.9)
AST SERPL-CCNC: 15 U/L (ref 12–45)
BASOPHILS # BLD AUTO: 0.8 % (ref 0–1.8)
BASOPHILS # BLD: 0.08 K/UL (ref 0–0.12)
BILIRUB SERPL-MCNC: 0.3 MG/DL (ref 0.1–1.5)
BUN SERPL-MCNC: 15 MG/DL (ref 8–22)
CALCIUM SERPL-MCNC: 8.5 MG/DL (ref 8.5–10.5)
CHLORIDE SERPL-SCNC: 104 MMOL/L (ref 96–112)
CO2 SERPL-SCNC: 28 MMOL/L (ref 20–33)
CREAT SERPL-MCNC: 1.06 MG/DL (ref 0.5–1.4)
EOSINOPHIL # BLD AUTO: 0.45 K/UL (ref 0–0.51)
EOSINOPHIL NFR BLD: 4.5 % (ref 0–6.9)
ERYTHROCYTE [DISTWIDTH] IN BLOOD BY AUTOMATED COUNT: 48 FL (ref 35.9–50)
GLOBULIN SER CALC-MCNC: 3.1 G/DL (ref 1.9–3.5)
GLUCOSE SERPL-MCNC: 84 MG/DL (ref 65–99)
HCT VFR BLD AUTO: 34.5 % (ref 37–47)
HGB BLD-MCNC: 10.9 G/DL (ref 12–16)
IMM GRANULOCYTES # BLD AUTO: 0.38 K/UL (ref 0–0.11)
IMM GRANULOCYTES NFR BLD AUTO: 3.8 % (ref 0–0.9)
LYMPHOCYTES # BLD AUTO: 2.97 K/UL (ref 1–4.8)
LYMPHOCYTES NFR BLD: 29.4 % (ref 22–41)
MCH RBC QN AUTO: 27.9 PG (ref 27–33)
MCHC RBC AUTO-ENTMCNC: 31.6 G/DL (ref 33.6–35)
MCV RBC AUTO: 88.2 FL (ref 81.4–97.8)
MONOCYTES # BLD AUTO: 1.43 K/UL (ref 0–0.85)
MONOCYTES NFR BLD AUTO: 14.2 % (ref 0–13.4)
NEUTROPHILS # BLD AUTO: 4.78 K/UL (ref 2–7.15)
NEUTROPHILS NFR BLD: 47.3 % (ref 44–72)
NRBC # BLD AUTO: 0 K/UL
NRBC BLD-RTO: 0 /100 WBC
PLATELET # BLD AUTO: 536 K/UL (ref 164–446)
PMV BLD AUTO: 8.4 FL (ref 9–12.9)
POTASSIUM SERPL-SCNC: 4 MMOL/L (ref 3.6–5.5)
PROT SERPL-MCNC: 6.2 G/DL (ref 6–8.2)
RBC # BLD AUTO: 3.91 M/UL (ref 4.2–5.4)
SODIUM SERPL-SCNC: 139 MMOL/L (ref 135–145)
WBC # BLD AUTO: 10.1 K/UL (ref 4.8–10.8)

## 2019-04-22 PROCEDURE — 36415 COLL VENOUS BLD VENIPUNCTURE: CPT

## 2019-04-22 PROCEDURE — 80053 COMPREHEN METABOLIC PANEL: CPT

## 2019-04-22 PROCEDURE — 700105 HCHG RX REV CODE 258: Performed by: INTERNAL MEDICINE

## 2019-04-22 PROCEDURE — 97606 NEG PRS WND THER DME>50 SQCM: CPT

## 2019-04-22 PROCEDURE — 700102 HCHG RX REV CODE 250 W/ 637 OVERRIDE(OP): Performed by: SURGERY

## 2019-04-22 PROCEDURE — 85025 COMPLETE CBC W/AUTO DIFF WBC: CPT

## 2019-04-22 PROCEDURE — A9270 NON-COVERED ITEM OR SERVICE: HCPCS | Performed by: SURGERY

## 2019-04-22 PROCEDURE — 700111 HCHG RX REV CODE 636 W/ 250 OVERRIDE (IP): Performed by: INTERNAL MEDICINE

## 2019-04-22 RX ADMIN — DILTIAZEM HYDROCHLORIDE 240 MG: 120 CAPSULE, COATED, EXTENDED RELEASE ORAL at 05:23

## 2019-04-22 RX ADMIN — OXYCODONE HYDROCHLORIDE AND ACETAMINOPHEN 2 TABLET: 5; 325 TABLET ORAL at 08:58

## 2019-04-22 RX ADMIN — OMEPRAZOLE 20 MG: 20 CAPSULE, DELAYED RELEASE ORAL at 05:23

## 2019-04-22 RX ADMIN — PIPERACILLIN AND TAZOBACTAM 4.5 G: 4; .5 INJECTION, POWDER, LYOPHILIZED, FOR SOLUTION INTRAVENOUS; PARENTERAL at 05:23

## 2019-04-22 RX ADMIN — SPIRONOLACTONE 25 MG: 50 TABLET ORAL at 05:23

## 2019-04-22 ASSESSMENT — ENCOUNTER SYMPTOMS
NAUSEA: 0
FEVER: 0
DIARRHEA: 0
DIAPHORESIS: 0
COUGH: 0
VOMITING: 0
HEADACHES: 0
SHORTNESS OF BREATH: 0
DIZZINESS: 0
CHILLS: 0
MYALGIAS: 0
SORE THROAT: 1

## 2019-04-22 NOTE — WOUND TEAM
[facility]  Inpatient Services  Wound and Skin Care Progress Note    Admission Date:  4/16/2019   HPI, PMH, SH: Reviewed  Unit where seen by Wound Team: T413/01    WOUND TEAM FOLLOW UP:  Scheduled npwt drsg change    SUBJECTIVE:  Pleasant/agreeable -- spouse present      Self Report / Pain Level:  Pain with drsg removal despite pre-med     OBJECTIVE:  nsg attempted to reinforce 2/2 leak    WOUND TYPE, LOCATION, CHARACTERISTICS (Pressure ulcers: location, stage, POA or date identified)     Negative Pressure Wound Therapy Breast Right (Active)   NPWT Pump Mode / Pressure Setting 125 mmHg;Continuous    Dressing Type Medium;Black foam    Number of Foam Pieces Used 2 plus bridge and button    Canister Changed No      Wound 04/16/19 Full Thickness Wound Breast right, complicated open surgical (Active)   Wound Image   4/18/2019   Site Assessment Red    Rin-wound Assessment Intact    Margins Defined edges; some yellow/black    Wound Length (cm) 4 cm Measured 4/18/2019   Wound Width (cm) 17.5 cm    Wound Depth (cm) 4 cm    Wound Surface Area (cm^2) 70 cm^2    Undermining 5 cm    Closure Secondary intention    Drainage Amount mod    Drainage Description Serosanguineous    Non-staged Wound Description Full thickness    Treatments npwt    Cleansing Approved Wound Cleanser    Periwound Protectant Benzoin;Drape    Dressing Options Wound Vac    Dressing Changed Changed    Dressing Status nsg attempted to reinforce    Dressing Change Frequency Monday, Wednesday, Friday    NEXT Dressing Change  04/24/19    NEXT Weekly Photo (Inpatient Only) 04/24/19    WOUND NURSE ONLY - Odor None    WOUND NURSE ONLY - Exposed Structures None    WOUND NURSE ONLY - Tissue Type and Percentage red 90 yellow 10        INTERVENTIONS BY WOUND TEAM:  Prev drsg removed -- wnd irrigated with wnd cleanser -- Jasmin Iraheta/[place] in to have a look -- benzoin and drape rin-wnd -- adaptic over the wnd -- 2 pieces black foam to fill the wnd; plus  a bridge and button to the upper chest -- sealed with drape and cont neg pressure applied at 125mmhg    Interdisciplinary consultation:  RN, patient, Liz Iraheta/[place]    EVALUATION:  wnd generally clean with some necrotic tissue/slough along the edges; should make progress with npwt    Factors affecting wound healing:  Invasive ductal cancer, incisional infection    Goals:  Control drainage -- increase granulation -- steady decrease in wound area and depth weekly     NURSING PLAN OF CARE ORDERS (x):    Dressing changes: See Dressing Care orders:  x  Skin care: See Skin Care orders: x  Rectal tube care: See Rectal Tube Care orders:   Other orders:      WOUND TEAM PLAN OF CARE (x):   NPWT change 3 x week:   x     Dressing changes by wound team:       Follow up as needed:     x  Other (explain):    Anticipated discharge plans (x):  SNF:           Home Care:           Outpatient Wound Center:   [facility]         Self Care:            Other:

## 2019-04-22 NOTE — PROGRESS NOTES
Surgical Progress Note    Author: Jorgito Marmolejo Date & Time created: 2019   9:56 AM     Interval Events:  Doing well, undergoing wound vac change now.  Per Dr. Iraheta, may be discharged home with oral antibiotics x 2 weeks, Dr. Iraheta to prescribe.    ROS  Hemodynamics:  Temp (24hrs), Av.7 °C (98 °F), Min:36.6 °C (97.8 °F), Max:36.9 °C (98.4 °F)  Temperature: 36.6 °C (97.8 °F)  Pulse  Av.6  Min: 63  Max: 99   Blood Pressure: 143/90     Respiratory:    Respiration: 18, Pulse Oximetry: 96 %           Neuro:  GCS = 15       Fluids:    Intake/Output Summary (Last 24 hours) at 19 0956  Last data filed at 19 0900   Gross per 24 hour   Intake              680 ml   Output             1625 ml   Net             -945 ml        Current Diet Order   Procedures   • Diet Order Regular     Physical Exam   Pulmonary/Chest:   Wound clean, beginning to granulate.     Labs:  Recent Results (from the past 24 hour(s))   CBC WITH DIFFERENTIAL    Collection Time: 19  2:59 AM   Result Value Ref Range    WBC 10.1 4.8 - 10.8 K/uL    RBC 3.91 (L) 4.20 - 5.40 M/uL    Hemoglobin 10.9 (L) 12.0 - 16.0 g/dL    Hematocrit 34.5 (L) 37.0 - 47.0 %    MCV 88.2 81.4 - 97.8 fL    MCH 27.9 27.0 - 33.0 pg    MCHC 31.6 (L) 33.6 - 35.0 g/dL    RDW 48.0 35.9 - 50.0 fL    Platelet Count 536 (H) 164 - 446 K/uL    MPV 8.4 (L) 9.0 - 12.9 fL    Neutrophils-Polys 47.30 44.00 - 72.00 %    Lymphocytes 29.40 22.00 - 41.00 %    Monocytes 14.20 (H) 0.00 - 13.40 %    Eosinophils 4.50 0.00 - 6.90 %    Basophils 0.80 0.00 - 1.80 %    Immature Granulocytes 3.80 (H) 0.00 - 0.90 %    Nucleated RBC 0.00 /100 WBC    Neutrophils (Absolute) 4.78 2.00 - 7.15 K/uL    Lymphs (Absolute) 2.97 1.00 - 4.80 K/uL    Monos (Absolute) 1.43 (H) 0.00 - 0.85 K/uL    Eos (Absolute) 0.45 0.00 - 0.51 K/uL    Baso (Absolute) 0.08 0.00 - 0.12 K/uL    Immature Granulocytes (abs) 0.38 (H) 0.00 - 0.11 K/uL    NRBC (Absolute) 0.00 K/uL   Comp Metabolic Panel    Collection  Time: 04/22/19  2:59 AM   Result Value Ref Range    Sodium 139 135 - 145 mmol/L    Potassium 4.0 3.6 - 5.5 mmol/L    Chloride 104 96 - 112 mmol/L    Co2 28 20 - 33 mmol/L    Anion Gap 7.0 0.0 - 11.9    Glucose 84 65 - 99 mg/dL    Bun 15 8 - 22 mg/dL    Creatinine 1.06 0.50 - 1.40 mg/dL    Calcium 8.5 8.5 - 10.5 mg/dL    AST(SGOT) 15 12 - 45 U/L    ALT(SGPT) 29 2 - 50 U/L    Alkaline Phosphatase 87 30 - 99 U/L    Total Bilirubin 0.3 0.1 - 1.5 mg/dL    Albumin 3.1 (L) 3.2 - 4.9 g/dL    Total Protein 6.2 6.0 - 8.2 g/dL    Globulin 3.1 1.9 - 3.5 g/dL    A-G Ratio 1.0 g/dL   ESTIMATED GFR    Collection Time: 04/22/19  2:59 AM   Result Value Ref Range    GFR If African American >60 >60 mL/min/1.73 m 2    GFR If Non  53 (A) >60 mL/min/1.73 m 2     Medical Decision Making, by Problem:    Post op wound infection, right mastectomy site - POD #6, status post drainage and debridement of right mastectomy site.  She is doing well, and is set up for wound vac care and changes in [place].  ID to E-prescribe antibiotics for patient.   Immunoglobulin deficiency   GERD   HTN    Plan:  Discharge home today    Quality Measures:  Quality-Core Measures    Discussed patient condition with RN, Patient and infectious disease

## 2019-04-22 NOTE — PROGRESS NOTES
Received bedside report and assumed care at 1900    Pt is A&O x4  Pain 6/10, medicated per MAR  Denies nausea  Tolerating regular diet  + Urine Output  + Flatus  + BM   Wound vac to right breast, track pad changed  Up self  Bed in lowest position and locked.  Reviewed plan of care with patient, pt resting comfortably now, call light within reach, all needs met at this time

## 2019-04-22 NOTE — DISCHARGE INSTRUCTIONS
Discharge Instructions    Discharged to home by car with relative. Discharged via wheelchair, hospital escort: Yes.  Special equipment needed: Wound VAC    Be sure to schedule a follow-up appointment with your primary care doctor or any specialists as instructed.     Discharge Plan:   Diet Plan: Discussed  Confirmed Follow up Appointment: Patient to Call and Schedule Appointment  Confirmed Symptoms Management: Discussed  Medication Reconciliation Updated: Yes  Pneumococcal Vaccine Administered/Refused: Not given - Patient refused pneumococcal vaccine  Influenza Vaccine Indication: Not indicated: Previously immunized this influenza season and > 8 years of age    I understand that a diet low in cholesterol, fat, and sodium is recommended for good health. Unless I have been given specific instructions below for another diet, I accept this instruction as my diet prescription.   Other diet: Regular diet as tolerated    Special Instructions:   Monitor for signs and symptoms of infection (fever, chills, nausea, vomiting)  Monitor incisions for swelling, redness, or drainage    Follow up with Francie this week   Follow up with plastic surgeon within a week  Follow up with Dr. Iraheta in 2 weeks.    No lifting restrictions.   Use over the counter laxative of choice if constipated.   Okay to shower with wound vac disconnected.   Follow up with Dr. Marmolejo ([facility], [phone]) as previously appointed    Incision Care, Adult  An incision is a surgical cut that is made through your skin. Most incisions are closed after surgery. Your incision may be closed with stitches (sutures), staples, skin glue, or adhesive strips. You may need to return to your health care provider to have sutures or staples removed. This may occur several days to several weeks after your surgery. The incision needs to be cared for properly to prevent infection.  How to care for your incision  Incision care   · Follow instructions from  your health care provider about how to take care of your incision. Make sure you:  ¨ Wash your hands with soap and water before you change the bandage (dressing). If soap and water are not available, use hand .  ¨ Change your dressing as told by your health care provider.  ¨ Leave sutures, skin glue, or adhesive strips in place. These skin closures may need to stay in place for 2 weeks or longer. If adhesive strip edges start to loosen and curl up, you may trim the loose edges. Do not remove adhesive strips completely unless your health care provider tells you to do that.  · Check your incision area every day for signs of infection. Check for:  ¨ More redness, swelling, or pain.  ¨ More fluid or blood.  ¨ Warmth.  ¨ Pus or a bad smell.  · Ask your health care provider how to clean the incision. This may include:  ¨ Using mild soap and water.  ¨ Using a clean towel to pat the incision dry after cleaning it.  ¨ Applying a cream or ointment. Do this only as told by your health care provider.  ¨ Covering the incision with a clean dressing.  · Ask your health care provider when you can leave the incision uncovered.  · Do not take baths, swim, or use a hot tub until your health care provider approves. Ask your health care provider if you can take showers. You may only be allowed to take sponge baths for bathing.  Medicines  · If you were prescribed an antibiotic medicine, cream, or ointment, take or apply the antibiotic as told by your health care provider. Do not stop taking or applying the antibiotic even if your condition improves.  · Take over-the-counter and prescription medicines only as told by your health care provider.  General instructions  · Limit movement around your incision to improve healing.  ¨ Avoid straining, lifting, or exercise for the first month, or for as long as told by your health care provider.  ¨ Follow instructions from your health care provider about returning to your normal  activities.  ¨ Ask your health care provider what activities are safe.  · Protect your incision from the sun when you are outside for the first 6 months, or for as long as told by your health care provider. Apply sunscreen around the scar or cover it up.  · Keep all follow-up visits as told by your health care provider. This is important.  Contact a health care provider if:  · Your have more redness, swelling, or pain around the incision.  · You have more fluid or blood coming from the incision.  · Your incision feels warm to the touch.  · You have pus or a bad smell coming from the incision.  · You have a fever or shaking chills.  · You are nauseous or you vomit.  · You are dizzy.  · Your sutures or staples come undone.  Get help right away if:  · You have a red streak coming from your incision.  · Your incision bleeds through the dressing and the bleeding does not stop with gentle pressure.  · The edges of your incision open up and separate.  · You have severe pain.  · You have a rash.  · You are confused.  · You faint.  · You have trouble breathing and a fast heartbeat.  This information is not intended to replace advice given to you by your health care provider. Make sure you discuss any questions you have with your health care provider.  Document Released: 07/07/2006 Document Revised: 08/25/2017 Document Reviewed: 07/05/2017  [employer] Interactive Patient Education © 2017 [employer]      Vacuum-Assisted Closure Therapy Home Guide  Vacuum-assisted closure therapy (VAC therapy) is a device that helps wounds heal. It is used on wounds that cannot be closed with stitches. They often heal slowly. VAC therapy helps the wound stay clean and healthy while its edges slowly grow back together.  VAC therapy uses a bandage (dressing) that is made of foam. It is put inside the wound. Then, a drape is placed over the wound. This drape sticks to your skin (adhesive) to keep air out. A tube is hooked up to a small pump and  is attached to the drape. The pump sucks fluid and germs from the wound. It can also decrease any bad smell that comes from the wound.  RISKS AND COMPLICATIONS  VAC therapy is usually safe to use at home. Your skin may get sore from the adhesive drape. That is the most common problem. However, more serious problems can develop, such as:   · Bleeding. This can happen if the dressing in the wound comes into contact with blood vessels. A little bleeding may occur when the dressing is being changed. This is normal now and then. Major bleeding can happen if a large blood vessel breaks. This is more likely if you are taking blood-thinning medicine. Emergency surgery may be needed.  · Infection. This can happen if the dressing has an air leak that is not repaired within a couple of hours.  · Dehydration. This can happen if the pump sucks out too much body fluid.  DRESSING CHANGES  Your dressing will have to be changed. Sometimes this is needed once a day. Other times, a dressing change must be done 3 times a week. How often you change your dressing will depend on what your wound is like. A trained caregiver will most likely change the dressing. However, a family member or friend may be trained to change the dressing. Below are steps to change a dressing in order to prevent an infection. The steps apply to you or the person that changes your dressing.  · Wash your hands with soap and water before and after each dressing change.  · Wear gloves and protective clothing. This may include eye protection.  · Do not allow anyone to change your dressing if they have an infection or a skin condition. Even a small cut can be a problem.  To change the dressing:   · Turn off the pump.  · Take off the adhesive drape.  · Disconnect the tube from the dressing.  · Take out the dressing that is inside the wound. If the dressing sticks, use a germ-free (sterile), saltwater solution to wet the dressing. This helps it come out more easily. If  it hurts when the dressing is changed, take pain medicine 30 minutes before the dressing change.  · Cleanse the wound with normal saline or sterile water.  · Apply a skin barrier film to the skin that will be covered with the drape. This will protect the skin.  · Put a new dressing into the wound.  · Apply a new drape and tube.  · Replace the container in the pump that collects fluid if it is full. Do this at least once per week.  · Turn the pump back on.  · Your doctor will decide what setting of suction is best. Do not change the settings on the machine without talking to your nurse or doctor.  HOME CARE INSTRUCTIONS   · The VAC pump has an alarm. It goes off if there are any problems such as a leak.  ¨ Ask your caregiver what to do if the alarm goes off.  ¨ Call your caregiver right away if the alarm goes off and you cannot fix the problem.  · Do not turn off the pump for more than 2 hours.  · Check your wound carefully at each dressing change for signs of infection. Watch for redness, swelling, or any fluid leaking from the wound. If you develop an infection:  ¨ You may have to stop VAC therapy.  ¨ The wound will need to be cleaned and washed out.  ¨ You will have to take antibiotic medicine.  · Ask your caregiver what activities you should or should not do while you are getting VAC therapy. This will depend on your particular wound.  · Ask if it is okay to turn off the pump so you can take a shower. If it is okay, make sure the wound is covered with plastic. The wound area must stay dry.  · Drink enough fluids to keep your urine clear or pale yellow.  · Eat foods that contain a lot of protein. Examples are meat, poultry, seafood, eggs, nuts, beans, and peas. Protein can help your wound heal.  SEEK MEDICAL CARE IF:  · Your wound itches or hurts.  · Dressing changes are often painful or bleeding often occurs.  · You have a headache.  · You have diarrhea.  · You have a sore throat.  · You have a rash.  · You feel  nauseous.  · You feel dizzy or weak.  SEEK IMMEDIATE MEDICAL CARE IF:   · You have very bad pain.  · You have bleeding that will not stop.  · Your wound smells bad.  · You have redness, swelling, or fluid leaking from your wound.  · Your alarm goes off and you do not know what to do.  · You have a fever.  This information is not intended to replace advice given to you by your health care provider. Make sure you discuss any questions you have with your health care provider.  Document Released: 03/11/2013 Document Reviewed: 09/22/2016  [employer] Interactive Patient Education © 2017 [employer]      Depression / Suicide Risk  Vacuum-Assisted Closure Therapy  Vacuum-assisted closure (VAC) therapy uses a device that removes fluid and germs from wounds to help them heal. It is used on wounds that cannot be closed with stitches. They often heal slowly. Vacuum-assisted therapy helps the wound stay clean and healthy while the open wound slowly grows back together.  Vacuum-assisted closure therapy uses a bandage (dressing) that is made of foam. It is put inside the wound. Then, a drape is placed over the wound. This drape sticks to your skin to keep air out, and to protect the wound. A tube is hooked up to a small pump and is attached to the drape. The pump sucks out the fluid and germs. Vacuum-assisted closure therapy can also help reduce the bad smell that comes from the wound.  HOW DOES IT WORK?   The vacuum pump pulls fluid through the foam dressing. The dressing may wrinkle during this process. The fluid goes into the tube and away from the wound. The fluid then goes into a container. The fluid in the container must be replaced if it is full or at least once a week, even if the container is not full. The pulling from the pump helps to close the wound and bring better circulation to the wound area.  The foam dressing covers and protects the wound. It helps your wound heal faster.   HOW DOES IT FEEL?   · You might feel a  little pulling when the pump is on.  · You might also feel a mild vibrating sensation.    · You might feel some discomfort when the dressing is taken off.  CAN I MOVE AROUND WITH VACUUM-ASSISTED CLOSURE THERAPY?  Yes, it has a backup battery which is used when the machine is not plugged in, as long as the battery is working, you can move freely.  WHAT ARE SOME THINGS I MUST KNOW?  · Do not turn off the pump yourself, unless instructed to do so by your healthcare provider, such as for bathing.  · Do not take off the dressing yourself, unless instructed to do so by your caregiver.  · You can wash or shower with the dressing. However, do not take the pump into the shower. Make sure the wound dressing is protected and covered with plastic. The wound area must stay dry.  · Do not turn off the pump for more than 2 hours. If the pump is off for more than 2 hours, your nurse must change your dressing.  · Check frequently that the machine is on, that the machine indicates the therapy is on, and that all clamps are open.  THE ALARM IS SOUNDING! WHAT SHOULD I DO?   · Stay calm.  · Do not turn off the pump or do anything with the dressing.  · Call your clinic or caregiver right away if the alarm goes off and you cannot fix the problem. Some reasons the alarm might go off include:  ¨ The fluid collection container is full.  ¨ The battery is low.  ¨ The dressing has a leak.  · Explain to your caregiver what is happening. Follow the instructions you receive.  WHEN SHOULD I CALL FOR HELP?   · You have severe pain.  · You have difficulty breathing.  · You have bleeding that will not stop.  · Your wound smells bad.  · You have redness, swelling, or fluid leaking from your wound.  · Your alarm goes off and you do not know what to do.  · You have a fever.  · Your wound itches severely.  · Your dressing changes are often painful or bleeding often occurs.  · You have diarrhea.  · You have a sore throat.  · You have a rash around the  dressing or anywhere else on your body.  · You feel nauseous.  · You feel dizzy or weak.  · The VAC machine has been off for more than 2 hours.  HOW DO I GET READY TO GO HOME WITH A PUMP?   A trained caregiver will talk to you and answer your questions about your vacuum-assisted closure therapy before you go home. He or she will explain what to expect. A caregiver will come to your home to apply the pump and care for your wound.  The at-home caregiver will be available for questions and will come back for the scheduled dressing changes, usually every 48-72 hours (or more often for severely infected wounds). Your at-home caregiver will also come if you are having an unexpected problem. If you have questions or do not know what to do when you go home, talk to your healthcare provider.  This information is not intended to replace advice given to you by your health care provider. Make sure you discuss any questions you have with your health care provider.  Document Released: 11/30/2009 Document Revised: 08/20/2014 Document Reviewed: 09/22/2016  [employer] Interactive Patient Education © 2017 [employer]    As you are discharged from this [facility] facility, it is important to learn how to keep safe from harming yourself.    Recognize the warning signs:  · Abrupt changes in personality, positive or negative- including increase in energy   · Giving away possessions  · Change in eating patterns- significant weight changes-  positive or negative  · Change in sleeping patterns- unable to sleep or sleeping all the time   · Unwillingness or inability to communicate  · Depression  · Unusual sadness, discouragement and loneliness  · Talk of wanting to die  · Neglect of personal appearance   · Rebelliousness- reckless behavior  · Withdrawal from people/activities they love  · Confusion- inability to concentrate     If you or a loved one observes any of these behaviors or has concerns about self-harm, here's what you can  do:  · Talk about it- your feelings and reasons for harming yourself  · Remove any means that you might use to hurt yourself (examples: pills, rope, extension cords, firearm)  · Get professional help from the community (Mental Health, Substance Abuse, psychological counseling)  · Do not be alone:Call your Safe Contact- someone whom you trust who will be there for you.  · Call your local CRISIS HOTLINE 106-5930 or 785-176-2104  · Call your local Children's Mobile Crisis Response Team [place] (371) 844-2612 or www.GetFeedback  · Call the toll free National Suicide Prevention Hotlines   · National Suicide Prevention Lifeline 977-999-IPRE (3710)  · National Hope Line Network 800-SUICIDE (597-7994)

## 2019-04-22 NOTE — DISCHARGE SUMMARY
DATE OF ADMISSION:  04/16/2019    DATE OF DISCHARGE:  04/22/2019    DIAGNOSIS ON ADMISSION:  Right mastectomy site postoperative wound infection.    DIAGNOSIS ON DISCHARGE:  Right mastectomy site postoperative wound infection.    HISTORY AND PHYSICAL:  Please see the dictated history and physical.    HOSPITAL COURSE:  Patient was admitted to my service and was taken to the   operating room in the evening of 04/16/2019 for an incision and drainage and   debridement of right mastectomy site wound infection.  She tolerated this well   and was started on moist to dry dressing changes.  Cultures were sent from   the operating room.  On postop day #2, she had a wound VAC placed, she   remained hemodynamically stable and did well throughout her hospital stay.    She was noted, however, to have polymicrobial infection with 2 gram-negative   rods and group D enterococcus initially.  Infectious disease consult was   obtained and it was recommended that the patient stay in the hospital on IV   antibiotics until Monday.  She was continued on Zosyn 4.5 g q. 8 hours IV and   continued to do well.  She was seen again by Dr. Iraheta, today who determined   that she could be discharged home on oral antibiotics and will be Dr. Iraheta   prescribing those oral antibiotics to her.  She is undergoing a wound VAC   change and has been set up for scheduled wound VAC changes at [facility].    Her pain was well controlled and she is ready for discharge home.    DISCHARGE MEDICATIONS:  She will be prescribed antibiotics by Dr. Iraheta and will   resume her home medications.  She has no new prescriptions for me.    DISCHARGE RECOMMENDATIONS:  She will follow up with me later this week and   with her plastic surgeon the day after that.  She will follow up with Dr. Iraheta   in 2 weeks.       ____________________________________     MD JON Rodriguez / LIOR    DD:  04/22/2019 10:05:05  DT:  04/22/2019 10:25:16    D#:  [id number]  Job#:  [id number]

## 2019-04-22 NOTE — PROGRESS NOTES
Bedside report received. Assessment completed.  Pt is A&O x4. Pt on room air.   Medicating for pain PRN per MAR  Denies nausea.   - numbness, - tingling.  Wound vac to Right breast with air leak at change of shift, drape tape applied-vac now running   Last BM 4/20   +void.  Regular diet. Tolerates well.   Pt up self.   Call light within reach. All needs met at this time. Fall Precautions and hourly rounding in place.

## 2019-04-22 NOTE — DISCHARGE PLANNING
Anticipated Discharge Disposition: Home with wound vac and homehealth    Action: This RN case manager called  [facility] to verify patient was on service for wound vac changes. Patient's first apt is scheduled for Wednesday April 24 at 0830, patient to check in at main hospital entrance.    Barriers to Discharge: none    Plan: No further DC needs identified at this point, This RN CM updated bedside RN, Dr. Castanon, and patient

## 2019-04-22 NOTE — PROGRESS NOTES
"Infectious Disease Progress Note    Author: Vanessa Iraheta M.D. Date & Time created: 4/22/2019  10:20 AM    Interval History:  Rx: Piperacillin-tazobactam, day #4  Previously ampicillin-sulbactam 4/16-4/18.  POD #6    4/20: VAC in place.  Can hear leak, but sponge still compressed.  Tried adding another piece of adhesive, but still can hear leak.  Wound care team due to change dressing today.  Tolerating IV therapy.  4/21: VAC changed yesterday.  She thinks it is \"whistling\" again.  4/22: Wound seen with wound care team and Dr Marmolejo.  Still having issues with leaking.  Culture of abscess has grown a Klebsiella pneumoniae resistant only to ampicillin, pan sensitive E.coli, and ampicillin sensitive E.faecalis.   Labs Reviewed, Medications Reviewed, Wound Reviewed and Fluids Reviewed    Review of Systems:  Review of Systems   Constitutional: Negative for chills, diaphoresis, fever and malaise/fatigue.   HENT: Positive for sore throat.    Respiratory: Negative for cough and shortness of breath.    Cardiovascular: Positive for chest pain.   Gastrointestinal: Negative for diarrhea, nausea and vomiting.   Genitourinary: Negative for dysuria.   Musculoskeletal: Negative for myalgias.   Skin: Negative for rash.   Neurological: Negative for dizziness and headaches.     Physical Exam:  Physical Exam   Constitutional: She is oriented to person, place, and time. She appears well-developed and well-nourished. No distress.   Cardiovascular: Normal rate and regular rhythm.  Exam reveals no gallop.    No murmur heard.  Pulmonary/Chest: Effort normal and breath sounds normal. No respiratory distress. She has no wheezes. She has no rales.   VAC in right chest mastectomy wound with surrounding tenderness.  Wound examined.  Good granulation in the more medial and upper aspect.  Non in the lateral aspect.  No drainage or odor.   Abdominal: Soft. Bowel sounds are normal. She exhibits no distension. There is no tenderness. "   Musculoskeletal: She exhibits no edema.   Neurological: She is alert and oriented to person, place, and time.   Skin: Skin is warm and dry. No rash noted. She is not diaphoretic.   Psychiatric: She has a normal mood and affect.   Nursing note and vitals reviewed.    Labs:  Recent Results (from the past 24 hour(s))   CBC WITH DIFFERENTIAL    Collection Time: 04/22/19  2:59 AM   Result Value Ref Range    WBC 10.1 4.8 - 10.8 K/uL    RBC 3.91 (L) 4.20 - 5.40 M/uL    Hemoglobin 10.9 (L) 12.0 - 16.0 g/dL    Hematocrit 34.5 (L) 37.0 - 47.0 %    MCV 88.2 81.4 - 97.8 fL    MCH 27.9 27.0 - 33.0 pg    MCHC 31.6 (L) 33.6 - 35.0 g/dL    RDW 48.0 35.9 - 50.0 fL    Platelet Count 536 (H) 164 - 446 K/uL    MPV 8.4 (L) 9.0 - 12.9 fL    Neutrophils-Polys 47.30 44.00 - 72.00 %    Lymphocytes 29.40 22.00 - 41.00 %    Monocytes 14.20 (H) 0.00 - 13.40 %    Eosinophils 4.50 0.00 - 6.90 %    Basophils 0.80 0.00 - 1.80 %    Immature Granulocytes 3.80 (H) 0.00 - 0.90 %    Nucleated RBC 0.00 /100 WBC    Neutrophils (Absolute) 4.78 2.00 - 7.15 K/uL    Lymphs (Absolute) 2.97 1.00 - 4.80 K/uL    Monos (Absolute) 1.43 (H) 0.00 - 0.85 K/uL    Eos (Absolute) 0.45 0.00 - 0.51 K/uL    Baso (Absolute) 0.08 0.00 - 0.12 K/uL    Immature Granulocytes (abs) 0.38 (H) 0.00 - 0.11 K/uL    NRBC (Absolute) 0.00 K/uL   Comp Metabolic Panel    Collection Time: 04/22/19  2:59 AM   Result Value Ref Range    Sodium 139 135 - 145 mmol/L    Potassium 4.0 3.6 - 5.5 mmol/L    Chloride 104 96 - 112 mmol/L    Co2 28 20 - 33 mmol/L    Anion Gap 7.0 0.0 - 11.9    Glucose 84 65 - 99 mg/dL    Bun 15 8 - 22 mg/dL    Creatinine 1.06 0.50 - 1.40 mg/dL    Calcium 8.5 8.5 - 10.5 mg/dL    AST(SGOT) 15 12 - 45 U/L    ALT(SGPT) 29 2 - 50 U/L    Alkaline Phosphatase 87 30 - 99 U/L    Total Bilirubin 0.3 0.1 - 1.5 mg/dL    Albumin 3.1 (L) 3.2 - 4.9 g/dL    Total Protein 6.2 6.0 - 8.2 g/dL    Globulin 3.1 1.9 - 3.5 g/dL    A-G Ratio 1.0 g/dL   ESTIMATED GFR    Collection Time:  04/22/19  2:59 AM   Result Value Ref Range    GFR If African American >60 >60 mL/min/1.73 m 2    GFR If Non  53 (A) >60 mL/min/1.73 m 2     Results     Procedure Component Value Units Date/Time    CULTURE WOUND W/ GRAM STAIN [[id number]]  (Abnormal)  (Susceptibility) Collected:  04/16/19 1626    Order Status:  Completed Specimen:  Wound Updated:  04/19/19 1019     Significant Indicator POS (POS)     Source WND     Site right mastectomy     Culture Result Wound - (A)     Gram Stain Result Many WBCs.  Rare Gram negative rods.       Culture Result Wound Escherichia coli  Light growth      Klebsiella pneumoniae  Light growth      Enterococcus faecalis  Light growth    Culture & Susceptibility     ENTEROCOCCUS FAECALIS     Antibiotic Sensitivity Microscan Unit Status    Ampicillin Sensitive <=2 mcg/mL Final    Method: RICKY    Daptomycin Sensitive 2 mcg/mL Final    Method: RICKY    Gent Synergy Sensitive <=500 mcg/mL Final    Method: RICKY    Penicillin Sensitive 2 mcg/mL Final    Method: RICKY    Vancomycin Sensitive 2 mcg/mL Final    Method: RICKY              ESCHERICHIA COLI     Antibiotic Sensitivity Microscan Unit Status    Ampicillin Sensitive <=8 mcg/mL Final    Method: RICKY    Cefepime Sensitive <=8 mcg/mL Final    Method: RICKY    Cefotaxime Sensitive <=2 mcg/mL Final    Method: RICKY    Cefotetan Sensitive <=16 mcg/mL Final    Method: RICKY    Ceftazidime Sensitive <=1 mcg/mL Final    Method: RICKY    Ceftriaxone Sensitive <=8 mcg/mL Final    Method: RICKY    Cefuroxime Sensitive <=4 mcg/mL Final    Method: RICKY    Ciprofloxacin Sensitive <=1 mcg/mL Final    Method: RICKY    Ertapenem Sensitive <=1 mcg/mL Final    Method: RICKY    Gentamicin Sensitive <=4 mcg/mL Final    Method: RICKY    Pip/Tazobactam Sensitive <=16 mcg/mL Final    Method: RICKY    Tigecycline Sensitive <=2 mcg/mL Final    Method: RICKY    Tobramycin Sensitive <=4 mcg/mL Final    Method: RICKY    Trimeth/Sulfa Sensitive <=2/38 mcg/mL Final    Method: RICKY               KLEBSIELLA PNEUMONIAE     Antibiotic Sensitivity Microscan Unit Status    Ampicillin Resistant >16 mcg/mL Final    Method: RICKY    Cefepime Sensitive <=8 mcg/mL Final    Method: RICKY    Cefotaxime Sensitive <=2 mcg/mL Final    Method: RICKY    Cefotetan Sensitive <=16 mcg/mL Final    Method: RICKY    Ceftazidime Sensitive <=1 mcg/mL Final    Method: RICKY    Ceftriaxone Sensitive <=8 mcg/mL Final    Method: RICKY    Cefuroxime Sensitive <=4 mcg/mL Final    Method: RICKY    Ciprofloxacin Sensitive <=1 mcg/mL Final    Method: RICKY    Ertapenem Sensitive <=1 mcg/mL Final    Method: RICKY    Gentamicin Sensitive <=4 mcg/mL Final    Method: RICKY    Pip/Tazobactam Sensitive <=16 mcg/mL Final    Method: RICKY    Tigecycline Sensitive <=2 mcg/mL Final    Method: RICKY    Tobramycin Sensitive <=4 mcg/mL Final    Method: RICKY    Trimeth/Sulfa Sensitive <=2/38 mcg/mL Final    Method: RICKY                       Anaerobic Culture [[id number]] Collected:  19    Order Status:  Completed Specimen:  Wound Updated:  19 1019     Significant Indicator NEG     Source WND     Site right mastectomy     Anaerobic Culture, Culture Res Culture in progress.    GRAM STAIN [[id number]] Collected:  19    Order Status:  Completed Specimen:  Wound Updated:  19 0023     Significant Indicator .     Source WND     Site right mastectomy     Gram Stain Result Many WBCs.  Rare Gram negative rods.          Hemodynamics:  Temp (24hrs), Av.7 °C (98 °F), Min:36.6 °C (97.8 °F), Max:36.9 °C (98.4 °F)  Temperature: 36.6 °C (97.8 °F)  Pulse  Av.6  Min: 63  Max: 99   Blood Pressure: 143/90     Peripheral IV 19 20 G Right Hand (Active)   Site Assessment Clean;Dry;Intact 2019  8:35 AM     Wound:  Wound 19 Full Thickness Wound Breast right, complicated open surgical (Active)   Wound Image   2019 11:35 AM   Site Assessment DEVAN 2019  8:32 AM   Rin-wound Assessment DEVAN 2019  8:32 AM   Margins DEVAN 2019   8:32 AM   Wound Length (cm) 4 cm 4/18/2019 11:35 AM   Wound Width (cm) 17.5 cm 4/18/2019 11:35 AM   Wound Depth (cm) 4 cm 4/18/2019 11:35 AM        Fluids:  Intake/Output       04/17/19 0700 - 04/18/19 0659 04/18/19 0700 - 04/19/19 0659 04/19/19 0700 - 04/20/19 0659      2402-5214 3572-9509 Total 9953-0804 8027-9859 Total 2804-1109 1045-9148 Total       Intake    P.O.  1120  600 1720  360  470 830  240  -- 240    I.V.  500  -- 500  --  -- --  --  -- --    IV Piggyback  220  -- 220  --  -- --  --  -- --    Total Intake 5233 182 3019 360 470 830 240 -- 240       Output    Urine  1050  800 1850  750  1100 1850  400  -- 400    Total Output 2516 503 2535 750 1150 1900 450 -- 450       Net I/O     790 -200 590 -390 -680 -1070 -210 -- -210      Medications:  Current Facility-Administered Medications   Medication Last Dose   • piperacillin-tazobactam (ZOSYN) 4.5 g in  mL IVPB Stopped at 04/19/19 0924   • DILTIAZem CD (CARDIZEM CD) capsule 240 mg 240 mg at 04/19/19 0523   • omeprazole (PRILOSEC) capsule 20 mg 20 mg at 04/19/19 0523   • spironolactone (ALDACTONE) tablet 25 mg 25 mg at 04/19/19 0523     Medical Decision Making, by Problem:  IMPRESSION:  1.  Right-sided breast abscess following mastectomy, acute polymicrobial with   E. coli, Klebsiella pneumoniae, and group D enterococcus.  Further identification is   pending.  2.  Invasive ductal carcinoma of the left breast, status post bilateral   mastectomies.  3.  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  4.  Gastroesophageal reflux disease.  5.  Hypertension.    Plan:  DC home today with wound VAC.  Wound examined.   Home with two weeks of oral antibiotics: Augmentin 500 mg TID and Cefdinir 300 mg BID.  RX to be escribed to her pharmacy of choice in [place] ( [facility] ).  Should not need any follow up with ID for the wound.  Will reschedule her follow up for her underlying CVID in a month or two.  Did receive her 30 gm of IVIG while in the  [facility].      Discussed with pt , wound care team and Dr Marmolejo.  55minutes, >50% in face to face contact coordinating care and counseling..

## 2019-04-22 NOTE — PROGRESS NOTES
Discharging Patient home per physician order.  Discharged with .  Demonstrated understanding of discharge instructions, follow up appointments, home medications, prescriptions, home care for surgical wound and nursing care instructions.  Ambulating without assistance, voiding without difficulty, pain well controlled, tolerating oral medications, oxygen saturation greater than 90%, tolerating diet.  Educational handouts given and discussed.  Verbalized understanding of discharge instructions and educational handouts.  All questions answered.  Belongings with patient at time of discharge.

## 2019-04-22 NOTE — CARE PLAN
Problem: Safety  Goal: Will remain free from falls  Outcome: PROGRESSING AS EXPECTED  Patient up self    Problem: Bowel/Gastric:  Goal: Normal bowel function is maintained or improved  Outcome: PROGRESSING AS EXPECTED  Patient having regular BMs

## 2019-04-26 ENCOUNTER — PATIENT OUTREACH (OUTPATIENT)
Dept: OTHER | Facility: MEDICAL CENTER | Age: 59
End: 2019-04-26

## 2019-04-30 ENCOUNTER — PATIENT OUTREACH (OUTPATIENT)
Dept: OTHER | Facility: MEDICAL CENTER | Age: 59
End: 2019-04-30

## 2019-04-30 NOTE — PROGRESS NOTES
Oncology Nurse Navigator telephone outreach. Pt states she still has her wound vac and is still having moderate pain. She stated it has been difficult to attend some of her social obligations due to the pump. She was interested in getting information re: breast prostheses and bras. Provided pt with information on where to get prostheses and bras in [place] and online, pt does not yet have RX, LM with Dr. Francie Acuna's MA.

## 2019-06-20 ENCOUNTER — PATIENT OUTREACH (OUTPATIENT)
Dept: OTHER | Facility: MEDICAL CENTER | Age: 59
End: 2019-06-20

## 2019-06-20 NOTE — PROGRESS NOTES
Oncology Nurse Navigator telephone outreach.  Pt states she still has the wound vac for at least the next four weeks. She states she is still experiencing some fatigue. She has obtained her prosthesis and mastectomy bras. She states she met with Dr. Salamanca and is taking an aromatase inhibitor, she states her next follow up will be in August. Discussed nutrition, she states she has lost a few pounds, discussed Ramakrishna for wound healing. Discussed purpose and contents of treatment summary / survivorship care plan, pt states she would like to have it mailed with telephone follow up.

## 2019-06-28 ENCOUNTER — PATIENT OUTREACH (OUTPATIENT)
Dept: OTHER | Facility: MEDICAL CENTER | Age: 59
End: 2019-06-28

## 2019-06-28 NOTE — PROGRESS NOTES
Phoned to review cancer treatment summary/ survivorship care plan which was mailed to pt 6/21/19.  No answer, left message to call back on home and cell lines.

## 2019-07-19 ENCOUNTER — HOSPITAL ENCOUNTER (OUTPATIENT)
Facility: MEDICAL CENTER | Age: 59
End: 2019-07-19
Attending: SURGERY | Admitting: SURGERY
Payer: COMMERCIAL

## 2019-07-19 ENCOUNTER — ANESTHESIA (OUTPATIENT)
Dept: SURGERY | Facility: MEDICAL CENTER | Age: 59
End: 2019-07-19
Payer: COMMERCIAL

## 2019-07-19 ENCOUNTER — ANESTHESIA EVENT (OUTPATIENT)
Dept: SURGERY | Facility: MEDICAL CENTER | Age: 59
End: 2019-07-19
Payer: COMMERCIAL

## 2019-07-19 VITALS
HEART RATE: 68 BPM | BODY MASS INDEX: 38 KG/M2 | TEMPERATURE: 98.3 F | OXYGEN SATURATION: 93 % | RESPIRATION RATE: 16 BRPM | WEIGHT: 214.51 LBS | SYSTOLIC BLOOD PRESSURE: 95 MMHG | DIASTOLIC BLOOD PRESSURE: 73 MMHG

## 2019-07-19 DIAGNOSIS — T81.49XA WOUND INFECTION AFTER SURGERY: ICD-10-CM

## 2019-07-19 LAB
BUN BLD-MCNC: 13 MG/DL (ref 8–22)
CA-I BLD ISE-SCNC: 1.14 MMOL/L (ref 1.1–1.3)
CHLORIDE BLD-SCNC: 105 MMOL/L (ref 96–112)
CO2 BLD-SCNC: 25 MMOL/L (ref 20–33)
CREAT BLD-MCNC: 1 MG/DL (ref 0.5–1.4)
EKG IMPRESSION: NORMAL
GLUCOSE BLD-MCNC: 124 MG/DL (ref 65–99)
HCT VFR BLD CALC: 40 % (ref 37–47)
HGB BLD-MCNC: 13.6 G/DL (ref 12–16)
PATHOLOGY CONSULT NOTE: NORMAL
POTASSIUM BLD-SCNC: 4 MMOL/L (ref 3.6–5.5)
SODIUM BLD-SCNC: 140 MMOL/L (ref 135–145)

## 2019-07-19 PROCEDURE — 700101 HCHG RX REV CODE 250: Performed by: ANESTHESIOLOGY

## 2019-07-19 PROCEDURE — 88305 TISSUE EXAM BY PATHOLOGIST: CPT

## 2019-07-19 PROCEDURE — 93010 ELECTROCARDIOGRAM REPORT: CPT | Performed by: INTERNAL MEDICINE

## 2019-07-19 PROCEDURE — 700105 HCHG RX REV CODE 258: Performed by: SURGERY

## 2019-07-19 PROCEDURE — 700111 HCHG RX REV CODE 636 W/ 250 OVERRIDE (IP): Performed by: ANESTHESIOLOGY

## 2019-07-19 PROCEDURE — 160025 RECOVERY II MINUTES (STATS): Performed by: SURGERY

## 2019-07-19 PROCEDURE — 700102 HCHG RX REV CODE 250 W/ 637 OVERRIDE(OP): Performed by: ANESTHESIOLOGY

## 2019-07-19 PROCEDURE — 160035 HCHG PACU - 1ST 60 MINS PHASE I: Performed by: SURGERY

## 2019-07-19 PROCEDURE — 160046 HCHG PACU - 1ST 60 MINS PHASE II: Performed by: SURGERY

## 2019-07-19 PROCEDURE — 160039 HCHG SURGERY MINUTES - EA ADDL 1 MIN LEVEL 3: Performed by: SURGERY

## 2019-07-19 PROCEDURE — 160002 HCHG RECOVERY MINUTES (STAT): Performed by: SURGERY

## 2019-07-19 PROCEDURE — 500440 HCHG DRESSING, STERILE ROLL (KERLIX): Performed by: SURGERY

## 2019-07-19 PROCEDURE — 85014 HEMATOCRIT: CPT

## 2019-07-19 PROCEDURE — 500423 HCHG DRESSING, ABD COMBINE: Performed by: SURGERY

## 2019-07-19 PROCEDURE — 160028 HCHG SURGERY MINUTES - 1ST 30 MINS LEVEL 3: Performed by: SURGERY

## 2019-07-19 PROCEDURE — 93005 ELECTROCARDIOGRAM TRACING: CPT | Performed by: SURGERY

## 2019-07-19 PROCEDURE — 160009 HCHG ANES TIME/MIN: Performed by: SURGERY

## 2019-07-19 PROCEDURE — 160047 HCHG PACU  - EA ADDL 30 MINS PHASE II: Performed by: SURGERY

## 2019-07-19 PROCEDURE — 500389 HCHG DRAIN, RESERVOIR SUCT JP 100CC: Performed by: SURGERY

## 2019-07-19 PROCEDURE — 500371 HCHG DRAIN, BLAKE 10MM: Performed by: SURGERY

## 2019-07-19 PROCEDURE — 160048 HCHG OR STATISTICAL LEVEL 1-5: Performed by: SURGERY

## 2019-07-19 PROCEDURE — A9270 NON-COVERED ITEM OR SERVICE: HCPCS | Performed by: ANESTHESIOLOGY

## 2019-07-19 PROCEDURE — 501838 HCHG SUTURE GENERAL: Performed by: SURGERY

## 2019-07-19 PROCEDURE — 80047 BASIC METABLC PNL IONIZED CA: CPT

## 2019-07-19 RX ORDER — SODIUM CHLORIDE, SODIUM LACTATE, POTASSIUM CHLORIDE, CALCIUM CHLORIDE 600; 310; 30; 20 MG/100ML; MG/100ML; MG/100ML; MG/100ML
INJECTION, SOLUTION INTRAVENOUS CONTINUOUS
Status: DISCONTINUED | OUTPATIENT
Start: 2019-07-19 | End: 2019-07-19 | Stop reason: HOSPADM

## 2019-07-19 RX ORDER — DEXAMETHASONE SODIUM PHOSPHATE 4 MG/ML
INJECTION, SOLUTION INTRA-ARTICULAR; INTRALESIONAL; INTRAMUSCULAR; INTRAVENOUS; SOFT TISSUE PRN
Status: DISCONTINUED | OUTPATIENT
Start: 2019-07-19 | End: 2019-07-19 | Stop reason: SURG

## 2019-07-19 RX ORDER — ONDANSETRON 2 MG/ML
4 INJECTION INTRAMUSCULAR; INTRAVENOUS
Status: DISCONTINUED | OUTPATIENT
Start: 2019-07-19 | End: 2019-07-19 | Stop reason: HOSPADM

## 2019-07-19 RX ORDER — CEFAZOLIN SODIUM 1 G/3ML
INJECTION, POWDER, FOR SOLUTION INTRAMUSCULAR; INTRAVENOUS PRN
Status: DISCONTINUED | OUTPATIENT
Start: 2019-07-19 | End: 2019-07-19 | Stop reason: SURG

## 2019-07-19 RX ORDER — DIPHENHYDRAMINE HYDROCHLORIDE 50 MG/ML
12.5 INJECTION INTRAMUSCULAR; INTRAVENOUS
Status: DISCONTINUED | OUTPATIENT
Start: 2019-07-19 | End: 2019-07-19 | Stop reason: HOSPADM

## 2019-07-19 RX ORDER — HYDROMORPHONE HYDROCHLORIDE 1 MG/ML
0.2 INJECTION, SOLUTION INTRAMUSCULAR; INTRAVENOUS; SUBCUTANEOUS
Status: DISCONTINUED | OUTPATIENT
Start: 2019-07-19 | End: 2019-07-19 | Stop reason: HOSPADM

## 2019-07-19 RX ORDER — OXYCODONE HCL 5 MG/5 ML
5 SOLUTION, ORAL ORAL
Status: COMPLETED | OUTPATIENT
Start: 2019-07-19 | End: 2019-07-19

## 2019-07-19 RX ORDER — OXYCODONE HCL 5 MG/5 ML
10 SOLUTION, ORAL ORAL
Status: COMPLETED | OUTPATIENT
Start: 2019-07-19 | End: 2019-07-19

## 2019-07-19 RX ORDER — BUPIVACAINE HYDROCHLORIDE AND EPINEPHRINE 5; 5 MG/ML; UG/ML
INJECTION, SOLUTION EPIDURAL; INTRACAUDAL; PERINEURAL
Status: DISCONTINUED
Start: 2019-07-19 | End: 2019-07-19 | Stop reason: HOSPADM

## 2019-07-19 RX ORDER — ONDANSETRON 2 MG/ML
INJECTION INTRAMUSCULAR; INTRAVENOUS PRN
Status: DISCONTINUED | OUTPATIENT
Start: 2019-07-19 | End: 2019-07-19 | Stop reason: SURG

## 2019-07-19 RX ORDER — EXEMESTANE 25 MG/1
25 TABLET ORAL
COMMUNITY
End: 2020-07-13

## 2019-07-19 RX ORDER — HYDROMORPHONE HYDROCHLORIDE 1 MG/ML
0.1 INJECTION, SOLUTION INTRAMUSCULAR; INTRAVENOUS; SUBCUTANEOUS
Status: DISCONTINUED | OUTPATIENT
Start: 2019-07-19 | End: 2019-07-19 | Stop reason: HOSPADM

## 2019-07-19 RX ORDER — HYDROMORPHONE HYDROCHLORIDE 2 MG/ML
INJECTION, SOLUTION INTRAMUSCULAR; INTRAVENOUS; SUBCUTANEOUS PRN
Status: DISCONTINUED | OUTPATIENT
Start: 2019-07-19 | End: 2019-07-19 | Stop reason: SURG

## 2019-07-19 RX ORDER — HYDROMORPHONE HYDROCHLORIDE 1 MG/ML
0.4 INJECTION, SOLUTION INTRAMUSCULAR; INTRAVENOUS; SUBCUTANEOUS
Status: DISCONTINUED | OUTPATIENT
Start: 2019-07-19 | End: 2019-07-19 | Stop reason: HOSPADM

## 2019-07-19 RX ORDER — HALOPERIDOL 5 MG/ML
1 INJECTION INTRAMUSCULAR
Status: DISCONTINUED | OUTPATIENT
Start: 2019-07-19 | End: 2019-07-19 | Stop reason: HOSPADM

## 2019-07-19 RX ORDER — MEPERIDINE HYDROCHLORIDE 25 MG/ML
12.5 INJECTION INTRAMUSCULAR; INTRAVENOUS; SUBCUTANEOUS
Status: DISCONTINUED | OUTPATIENT
Start: 2019-07-19 | End: 2019-07-19 | Stop reason: HOSPADM

## 2019-07-19 RX ORDER — IPRATROPIUM BROMIDE AND ALBUTEROL SULFATE 2.5; .5 MG/3ML; MG/3ML
3 SOLUTION RESPIRATORY (INHALATION)
Status: DISCONTINUED | OUTPATIENT
Start: 2019-07-19 | End: 2019-07-19 | Stop reason: HOSPADM

## 2019-07-19 RX ORDER — OXYCODONE HYDROCHLORIDE AND ACETAMINOPHEN 5; 325 MG/1; MG/1
1-2 TABLET ORAL EVERY 4 HOURS PRN
Qty: 35 TAB | Refills: 0 | Status: SHIPPED | OUTPATIENT
Start: 2019-07-19 | End: 2019-07-25

## 2019-07-19 RX ORDER — ACETAMINOPHEN 500 MG
1000 TABLET ORAL ONCE
Status: COMPLETED | OUTPATIENT
Start: 2019-07-19 | End: 2019-07-19

## 2019-07-19 RX ORDER — CELECOXIB 200 MG/1
200 CAPSULE ORAL ONCE
Status: COMPLETED | OUTPATIENT
Start: 2019-07-19 | End: 2019-07-19

## 2019-07-19 RX ADMIN — SODIUM CHLORIDE, POTASSIUM CHLORIDE, SODIUM LACTATE AND CALCIUM CHLORIDE: 600; 310; 30; 20 INJECTION, SOLUTION INTRAVENOUS at 11:41

## 2019-07-19 RX ADMIN — ACETAMINOPHEN 1000 MG: 500 TABLET ORAL at 11:41

## 2019-07-19 RX ADMIN — PROPOFOL 150 MG: 10 INJECTION, EMULSION INTRAVENOUS at 12:33

## 2019-07-19 RX ADMIN — SODIUM CHLORIDE, POTASSIUM CHLORIDE, SODIUM LACTATE AND CALCIUM CHLORIDE: 600; 310; 30; 20 INJECTION, SOLUTION INTRAVENOUS at 12:33

## 2019-07-19 RX ADMIN — ONDANSETRON 4 MG: 2 INJECTION INTRAMUSCULAR; INTRAVENOUS at 12:33

## 2019-07-19 RX ADMIN — OXYCODONE HYDROCHLORIDE 5 MG: 5 SOLUTION ORAL at 14:18

## 2019-07-19 RX ADMIN — CELECOXIB 200 MG: 200 CAPSULE ORAL at 11:41

## 2019-07-19 RX ADMIN — CEFAZOLIN 2 G: 330 INJECTION, POWDER, FOR SOLUTION INTRAMUSCULAR; INTRAVENOUS at 12:33

## 2019-07-19 RX ADMIN — HYDROMORPHONE HYDROCHLORIDE 1 MG: 2 INJECTION, SOLUTION INTRAMUSCULAR; INTRAVENOUS; SUBCUTANEOUS at 12:40

## 2019-07-19 RX ADMIN — HYDROMORPHONE HYDROCHLORIDE 1 MG: 2 INJECTION, SOLUTION INTRAMUSCULAR; INTRAVENOUS; SUBCUTANEOUS at 12:33

## 2019-07-19 RX ADMIN — DEXAMETHASONE SODIUM PHOSPHATE 4 MG: 4 INJECTION, SOLUTION INTRA-ARTICULAR; INTRALESIONAL; INTRAMUSCULAR; INTRAVENOUS; SOFT TISSUE at 12:33

## 2019-07-19 RX ADMIN — LIDOCAINE HYDROCHLORIDE 100 MG: 20 INJECTION, SOLUTION INTRAVENOUS at 12:33

## 2019-07-19 ASSESSMENT — PAIN SCALES - GENERAL: PAIN_LEVEL: 0

## 2019-07-19 NOTE — OR NURSING
1418 After eating a cracker patient given oxycodone 5mg po for pain 3-4/10.    1435 Resting with spouse at side. VSS.

## 2019-07-19 NOTE — OR SURGEON
Immediate Post OP Note    PreOp Diagnosis:  Left chest wall mass, rule out recurrent left breast cancer    PostOp Diagnosis:  Left chest wall mass, rule out recurrent left breast cancer    Procedure(s):  EXCISION OF LEFT LATERAL CHEST SOFT TISSUE Mass - Wound Class: Contaminated    Surgeon(s):  Jorgito Marmolejo M.D.    Anesthesiologist/Type of Anesthesia:  Anesthesiologist: Yaakov Olivas M.D./General    Surgical Staff:  Circulator: Harry Fitzgerald R.N.  Scrub Person: Omaira Ley    Specimens removed if any:  ID Type Source Tests Collected by Time Destination   A : Left chest wall mass Tissue Chest PATHOLOGY SPECIMEN Jorgito Marmolejo M.D. 7/19/2019  1:05 PM        Estimated Blood Loss: 50 ml    Findings: Firm tissue in soft tissue at lateral aspect of left mastectomy site.  Draining sinus at that site, new since pre-op exam    Complications: None        7/19/2019 1:21 PM Jorgito Marmolejo M.D.

## 2019-07-19 NOTE — ANESTHESIA QCDR
2019 [facility] Clinical Data Registry (for Quality Improvement)     Postoperative nausea/vomiting risk protocol (Adult = 18 yrs and Pediatric 3-17 yrs)- (430 and 463)  General inhalation anesthetic (NOT TIVA) with PONV risk factors: Yes  Provision of anti-emetic therapy with at least 2 different classes of agents: Yes   Patient DID NOT receive anti-emetic therapy and reason is documented in Medical Record:  N/A    Multimodal Pain Management- (AQI59)  Patient undergoing Elective Surgery (i.e. Outpatient, or ASC, or Prescheduled Surgery prior to Hospital Admission): Yes  Use of Multimodal Pain Management, two or more drugs and/or interventions, NOT including systemic opioids: Yes   Exception: Documented allergy to multiple classes of analgesics:  N/A    PACU assessment of acute postoperative pain prior to Anesthesia Care End- Applies to Patients Age = 18- (ABG7)  Initial PACU pain score is which of the following: < 7/10  Patient unable to report pain score: N/A    Post-anesthetic transfer of care checklist/protocol to PACU/ICU- (426 and 427)  Upon conclusion of case, patient transferred to which of the following locations: PACU/Non-ICU  Use of transfer checklist/protocol: Yes  Exclusion: Service Performed in Patient Hospital Room (and thus did not require transfer): N/A    PACU Reintubation- (AQI31)  General anesthesia requiring endotracheal intubation (ETT) along with subsequent extubation in OR or PACU: No  Required reintubation in the PACU: N/A  Extubation was a planned trial documented in the medical record prior to removal of the original airway device: N/A    Unplanned admission to ICU related to anesthesia service up through end of PACU care- (MD51)  Unplanned admission to ICU (not initially anticipated at anesthesia start time): No

## 2019-07-19 NOTE — OP REPORT
DATE OF SERVICE:  07/19/2019    PREOPERATIVE DIAGNOSIS:  Left chest wall mass, rule out recurrent left breast   cancer.    POSTOPERATIVE DIAGNOSIS:  Left chest wall mass, rule out recurrent left breast   cancer.    INDICATION FOR SURGERY:  This is a 58-year-old female who underwent a   bilateral mastectomy and left sentinel lymph node biopsy for a left breast   cancer.  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  She was seen for   routine followup 2 days ago and was noted to have a new mass in the lateral   aspect of her left chest wall at the lateral aspect of the previous mastectomy   site.  She was brought to the operating room for excision of that mass to   rule out recurrent cancer.    PROCEDURE:  Excision of left chest wall soft tissue mass.    SURGEON:  Jorgito Marmolejo MD    ASSISTANT:  None.    ANESTHESIOLOGIST:  Yaakov Olivas MD    ANESTHESIA:  General anesthesia.    FINDINGS:  Inflamed tissue at the lateral aspect of the left mastectomy site,   approximately 2.5 cm in dimension.  Draining sinus tract out of that area,   which was not present 2 days ago.    PROCEDURE NARRATIVE:  Signed informed consent was on the chart at the time of   the procedure.  The patient was brought to the operating room and placed in   the supine position.  General anesthesia was induced.  The previously placed   dressing was removed and the skin and opened tissue on the left chest was   prepped and draped in a sterile fashion using Betadine prep.  A timeout was   performed.  The patient was administered 2 grams of Ancef IV preoperatively.    Using a 10 blade scalpel, skin incision was made in an elliptical fashion to   provide access to the underlying mass.  The elliptical incision was   approximately 3 cm in horizontal direction with incisions both superiorly and    inferiorly.  The underlying soft tissue was dissected through using sharp   dissection and the mass was carefully excised from the surrounding soft   tissue.  This was sent to pathology for permanent examination.  The wound was   irrigated and dried.  Multiple areas of bleeding were addressed with careful   Bovie cauterization.  Once I was satisfied with the hemostasis, the wound was   irrigated with copious amount of normal saline.  Given the fact that there was   a draining sinus at that site and she already has an open wound and has had 2   wound infections, it was decided that the skin would not be closed at the   site, but instead would be packed as discussed with the patient and her    prior to surgery.  The wound was then packed with a Kerlix, which had   been moistened with clean normal saline and run almost completely dry.  The   more medial open area of her mastectomy site was also dressed with moist   Kerlix, which had been run almost completely dry.  Both sites were then   covered with dry 4x4s, which were held in place with Medipore tape.    FLUIDS:  1000 mL crystalloid.    ESTIMATED BLOOD LOSS:  25 mL.    DRAINS:  None.    SPECIMENS:  Left lateral chest wall mass to pathology, to rule out recurrent   neoplasia.    COMPLICATIONS:  None.    DISPOSITION:  Patient was in stable condition to the postanesthesia care unit.       ____________________________________     MD JON Rodriguez / LIOR    DD:  07/19/2019 13:35:18  DT:  07/19/2019 14:19:11    D#:  [id number]  Job#:  [id number]

## 2019-07-19 NOTE — DISCHARGE INSTRUCTIONS
ACTIVITY: Rest and take it easy for the first 24 hours.  A responsible adult is recommended to remain with you during that time.  It is normal to feel sleepy.  We encourage you to not do anything that requires balance, judgment or coordination.    MILD FLU-LIKE SYMPTOMS ARE NORMAL. YOU MAY EXPERIENCE GENERALIZED MUSCLE ACHES, THROAT IRRITATION, HEADACHE AND/OR SOME NAUSEA.    FOR 24 HOURS DO NOT:  Drive, operate machinery or run household appliances.  Drink beer or alcoholic beverages.   Make important decisions or sign legal documents.    SPECIAL INSTRUCTIONS:   No lifting restrictions.   Use over the counter laxative of choice if constipated.   Start moist to dry dressing changes in AM of Saturday, 7/20/19.  Okay to shower with packing out.  After shower, replace packing with clean packing.  Wound vac to be placed on Monday, July 22.   No baths, hot tubs, or swimming until follow up appointment.   Follow up with Dr. Marmolejo ([facility], [phone]) as previously appointed    DIET: To avoid nausea, slowly advance diet as tolerated, avoiding spicy or greasy foods for the first day.  Add more substantial food to your diet according to your physician's instructions.  Babies can be fed formula or breast milk as soon as they are hungry.  INCREASE FLUIDS AND FIBER TO AVOID CONSTIPATION.    SURGICAL DRESSING/BATHING: See above    FOLLOW-UP APPOINTMENT:  A follow-up appointment should be arranged with your doctor in as scheduled; call to schedule.    You should CALL YOUR PHYSICIAN if you develop:  Fever greater than 101 degrees F.  Pain not relieved by medication, or persistent nausea or vomiting.  Excessive bleeding (blood soaking through dressing) or unexpected drainage from the wound.  Extreme redness or swelling around the incision site, drainage of pus or foul smelling drainage.  Inability to urinate or empty your bladder within 8 hours.  Problems with breathing or chest pain.    You should call 911  if you develop problems with breathing or chest pain.  If you are unable to contact your doctor or surgical center, you should go to the nearest emergency room or urgent care center.  Physician's telephone #: Dr. Marmolejo [phone]    If any questions arise, call your doctor.  If your doctor is not available, please feel free to call the Surgical Center at [phone].  The Center is open Monday through Friday from 7AM to 7PM.  You can also call the HEALTH HOTLINE open 24 hours/day, 7 days/week and speak to a nurse at [phone], or toll free at [phone].    A registered nurse may call you a few days after your surgery to see how you are doing after your procedure.    MEDICATIONS: Resume taking daily medication.  Take prescribed pain medication with food.  If no medication is prescribed, you may take non-aspirin pain medication if needed.  PAIN MEDICATION CAN BE VERY CONSTIPATING.  Take a stool softener or laxative such as senokot, pericolace, or milk of magnesia if needed.    Prescription given for Percocet.  Last pain medication given at 2:18 pm (oxycodone 5 mg), next dose due 6:18 pm.    If your physician has prescribed pain medication that includes Acetaminophen (Tylenol), do not take additional Acetaminophen (Tylenol) while taking the prescribed medication.    Depression / Suicide Risk    As you are discharged from this [facility] facility, it is important to learn how to keep safe from harming yourself.    Recognize the warning signs:  · Abrupt changes in personality, positive or negative- including increase in energy   · Giving away possessions  · Change in eating patterns- significant weight changes-  positive or negative  · Change in sleeping patterns- unable to sleep or sleeping all the time   · Unwillingness or inability to communicate  · Depression  · Unusual sadness, discouragement and loneliness  · Talk of wanting to die  · Neglect of personal appearance   · Rebelliousness- reckless  behavior  · Withdrawal from people/activities they love  · Confusion- inability to concentrate     If you or a loved one observes any of these behaviors or has concerns about self-harm, here's what you can do:  · Talk about it- your feelings and reasons for harming yourself  · Remove any means that you might use to hurt yourself (examples: pills, rope, extension cords, firearm)  · Get professional help from the community (Mental Health, Substance Abuse, psychological counseling)  · Do not be alone:Call your Safe Contact- someone whom you trust who will be there for you.  · Call your local CRISIS HOTLINE 972-5454 or 201-926-4122  · Call your local Children's Mobile Crisis Response Team [place] (529) 538-1145 or www.[employer]  · Call the toll free National Suicide Prevention Hotlines   · National Suicide Prevention Lifeline 508-090-YSQK (4656)  · National Hope Line Network 800-SUICIDE (306-2932)

## 2019-07-19 NOTE — ANESTHESIA POSTPROCEDURE EVALUATION
Patient: Melanie Leyva    Procedure Summary     Date:  07/19/19 Room / Location:  [facility] ROOM 24 / SURGERY SAME DAY [facility]    Anesthesia Start:  1233 Anesthesia Stop:  1325    Procedure:  MASTECTOMY- RE-EXCISION OF LEFT LATERAL SOFT TISSUE (Left Breast) Diagnosis:  (LEFT BREAST CANCER )    Surgeon:  Jorgito Marmolejo M.D. Responsible Provider:  Yaakov Olivas M.D.    Anesthesia Type:  general ASA Status:  2          Final Anesthesia Type: general  Last vitals  BP   Blood Pressure: (!) 95/73    Temp   36.2 °C (97.1 °F)    Pulse   Pulse: 81   Resp   16    SpO2   95 %      Anesthesia Post Evaluation    Patient location during evaluation: PACU  Patient participation: complete - patient participated  Level of consciousness: awake and alert  Pain score: 0    Airway patency: patent  Anesthetic complications: no  Cardiovascular status: hemodynamically stable  Respiratory status: acceptable  Hydration status: euvolemic    PONV: none           Nurse Pain Score: 5 (NPRS)

## 2019-07-19 NOTE — ANESTHESIA PREPROCEDURE EVALUATION
History of breast cancer. Bilateral mastectomies. No anesthetic problems. MICKY. Controlled GERD.     Denies angina, dyspnea.     Relevant Problems   (+) Breast cancer, left breast (HCC)   (+) GERD (gastroesophageal reflux disease)   (+) Hypertension       Physical Exam    Airway   Mallampati: II  TM distance: >3 FB  Neck ROM: full       Cardiovascular - normal exam  Rhythm: regular  Rate: normal  (-) murmur     Dental - normal exam         Pulmonary - normal exam  Breath sounds clear to auscultation     Abdominal    Neurological - normal exam                 Anesthesia Plan    ASA 2       Plan - general       Airway plan will be LMA        Induction: intravenous    Postoperative Plan: Postoperative administration of opioids is intended.    Pertinent diagnostic labs and testing reviewed    Informed Consent:    Anesthetic plan and risks discussed with patient.    Use of blood products discussed with: patient whom consented to blood products.

## 2019-07-19 NOTE — ANESTHESIA PROCEDURE NOTES
Airway  Date/Time: 7/19/2019 12:38 PM  Performed by: ÁNGEL KIM  Authorized by: ÁNGEL KIM     Location:  OR  Urgency:  Elective  Indications for Airway Management:  Anesthesia  Spontaneous Ventilation: absent    Sedation Level:  Deep  Preoxygenated: Yes    Mask Difficulty Assessment:  0 - not attempted  Final Airway Type:  Supraglottic airway  Final Supraglottic Airway:  Standard LMA  SGA Size:  4  Number of Attempts at Approach:  1

## 2019-07-19 NOTE — OR NURSING
1326 Pt to PACU from OR. Report from anesthesia and OR RN. Pt arouses on calling, airway patent. Respirations even and unlabored. VSS. Declines pain, no nausea. Dressing L breast CDI.     1355 Meets phase II criteria.     1356 Report to STAN Brooks, for break coverage.    1426 Returned from break. Pt resting, appears comfortable. Respirations even and unlabored. No needs at this time.     1501 Pt getting dressed.     1515 Discharge orders received. Meets discharge criteria at this time. Tolerable pain. No nausea. Tolerates PO. On RA. All lines and monitors discontinued. Reviewed discharge paperwork with pt and spouse. Discussed diet, activity, medications, follow up care and worsening symptoms. No questions at this time. Pt to be discharged to home via private vehicle. Pt escorted out of department in wheelchair with RN, , and all belongings.

## 2019-07-19 NOTE — ANESTHESIA TIME REPORT
Anesthesia Start and Stop Event Times     Date Time Event    7/19/2019 1233 Anesthesia Start     1325 Anesthesia Stop        Responsible Staff  07/19/19    Name Role Begin End    Yaakov Olivas M.D. Anesth 1233 1325        Preop Diagnosis (Free Text):  Pre-op Diagnosis     LEFT BREAST CANCER         Preop Diagnosis (Codes):  Diagnosis Information     Diagnosis Code(s):         Post op Diagnosis  Open breast wound      Premium Reason  Non-Premium    Comments:

## 2020-07-13 RX ORDER — LETROZOLE 2.5 MG/1
2.5 TABLET, FILM COATED ORAL DAILY
COMMUNITY

## 2020-07-20 ENCOUNTER — ANESTHESIA (OUTPATIENT)
Dept: SURGERY | Facility: MEDICAL CENTER | Age: 60
End: 2020-07-20
Payer: COMMERCIAL

## 2020-07-20 ENCOUNTER — HOSPITAL ENCOUNTER (OUTPATIENT)
Facility: MEDICAL CENTER | Age: 60
End: 2020-07-20
Attending: SURGERY | Admitting: SURGERY
Payer: COMMERCIAL

## 2020-07-20 ENCOUNTER — ANESTHESIA EVENT (OUTPATIENT)
Dept: SURGERY | Facility: MEDICAL CENTER | Age: 60
End: 2020-07-20
Payer: COMMERCIAL

## 2020-07-20 VITALS
TEMPERATURE: 98.1 F | WEIGHT: 216.71 LBS | HEIGHT: 63 IN | BODY MASS INDEX: 38.4 KG/M2 | RESPIRATION RATE: 14 BRPM | HEART RATE: 90 BPM | OXYGEN SATURATION: 92 % | DIASTOLIC BLOOD PRESSURE: 72 MMHG | SYSTOLIC BLOOD PRESSURE: 133 MMHG

## 2020-07-20 LAB
ANION GAP SERPL CALC-SCNC: 11 MMOL/L (ref 7–16)
BUN SERPL-MCNC: 14 MG/DL (ref 8–22)
CALCIUM SERPL-MCNC: 9 MG/DL (ref 8.5–10.5)
CHLORIDE SERPL-SCNC: 100 MMOL/L (ref 96–112)
CO2 SERPL-SCNC: 25 MMOL/L (ref 20–33)
COVID ORDER STATUS COVID19: NORMAL
CREAT SERPL-MCNC: 0.77 MG/DL (ref 0.5–1.4)
EKG IMPRESSION: NORMAL
ERYTHROCYTE [DISTWIDTH] IN BLOOD BY AUTOMATED COUNT: 52.8 FL (ref 35.9–50)
GLUCOSE BLD-MCNC: 101 MG/DL (ref 65–99)
GLUCOSE SERPL-MCNC: 100 MG/DL (ref 65–99)
HCT VFR BLD AUTO: 38.5 % (ref 37–47)
HGB BLD-MCNC: 12.2 G/DL (ref 12–16)
MCH RBC QN AUTO: 25.4 PG (ref 27–33)
MCHC RBC AUTO-ENTMCNC: 31.7 G/DL (ref 33.6–35)
MCV RBC AUTO: 80.2 FL (ref 81.4–97.8)
PLATELET # BLD AUTO: 295 K/UL (ref 164–446)
PMV BLD AUTO: 9.1 FL (ref 9–12.9)
POTASSIUM SERPL-SCNC: 3.7 MMOL/L (ref 3.6–5.5)
RBC # BLD AUTO: 4.8 M/UL (ref 4.2–5.4)
SARS-COV-2 RDRP RESP QL NAA+PROBE: NOTDETECTED
SODIUM SERPL-SCNC: 136 MMOL/L (ref 135–145)
SPECIMEN SOURCE: NORMAL
WBC # BLD AUTO: 9.8 K/UL (ref 4.8–10.8)

## 2020-07-20 PROCEDURE — 160048 HCHG OR STATISTICAL LEVEL 1-5: Performed by: SURGERY

## 2020-07-20 PROCEDURE — 700105 HCHG RX REV CODE 258: Performed by: SURGERY

## 2020-07-20 PROCEDURE — 700111 HCHG RX REV CODE 636 W/ 250 OVERRIDE (IP): Performed by: ANESTHESIOLOGY

## 2020-07-20 PROCEDURE — 700101 HCHG RX REV CODE 250

## 2020-07-20 PROCEDURE — 160036 HCHG PACU - EA ADDL 30 MINS PHASE I: Performed by: SURGERY

## 2020-07-20 PROCEDURE — U0004 COV-19 TEST NON-CDC HGH THRU: HCPCS

## 2020-07-20 PROCEDURE — 83036 HEMOGLOBIN GLYCOSYLATED A1C: CPT

## 2020-07-20 PROCEDURE — 85027 COMPLETE CBC AUTOMATED: CPT

## 2020-07-20 PROCEDURE — 501838 HCHG SUTURE GENERAL: Performed by: SURGERY

## 2020-07-20 PROCEDURE — 502000 HCHG MISC OR IMPLANTS RC 0278: Performed by: SURGERY

## 2020-07-20 PROCEDURE — 700111 HCHG RX REV CODE 636 W/ 250 OVERRIDE (IP): Performed by: SURGERY

## 2020-07-20 PROCEDURE — 80048 BASIC METABOLIC PNL TOTAL CA: CPT

## 2020-07-20 PROCEDURE — L8600 IMPLANT BREAST SILICONE/EQ: HCPCS | Performed by: SURGERY

## 2020-07-20 PROCEDURE — 700111 HCHG RX REV CODE 636 W/ 250 OVERRIDE (IP)

## 2020-07-20 PROCEDURE — C9803 HOPD COVID-19 SPEC COLLECT: HCPCS | Performed by: SPECIALIST

## 2020-07-20 PROCEDURE — 82962 GLUCOSE BLOOD TEST: CPT

## 2020-07-20 PROCEDURE — 501445 HCHG STAPLER, SKIN DISP: Performed by: SURGERY

## 2020-07-20 PROCEDURE — 700101 HCHG RX REV CODE 250: Performed by: ANESTHESIOLOGY

## 2020-07-20 PROCEDURE — 160035 HCHG PACU - 1ST 60 MINS PHASE I: Performed by: SURGERY

## 2020-07-20 PROCEDURE — 500389 HCHG DRAIN, RESERVOIR SUCT JP 100CC: Performed by: SURGERY

## 2020-07-20 PROCEDURE — 160029 HCHG SURGERY MINUTES - 1ST 30 MINS LEVEL 4: Performed by: SURGERY

## 2020-07-20 PROCEDURE — 160002 HCHG RECOVERY MINUTES (STAT): Performed by: SURGERY

## 2020-07-20 PROCEDURE — 160009 HCHG ANES TIME/MIN: Performed by: SURGERY

## 2020-07-20 PROCEDURE — A9270 NON-COVERED ITEM OR SERVICE: HCPCS | Performed by: ANESTHESIOLOGY

## 2020-07-20 PROCEDURE — 700101 HCHG RX REV CODE 250: Performed by: SURGERY

## 2020-07-20 PROCEDURE — 160046 HCHG PACU - 1ST 60 MINS PHASE II: Performed by: SURGERY

## 2020-07-20 PROCEDURE — 500054 HCHG BANDAGE, ELASTIC 6: Performed by: SURGERY

## 2020-07-20 PROCEDURE — 93005 ELECTROCARDIOGRAM TRACING: CPT | Performed by: SURGERY

## 2020-07-20 PROCEDURE — 500423 HCHG DRESSING, ABD COMBINE: Performed by: SURGERY

## 2020-07-20 PROCEDURE — 160025 RECOVERY II MINUTES (STATS): Performed by: SURGERY

## 2020-07-20 PROCEDURE — 93010 ELECTROCARDIOGRAM REPORT: CPT | Performed by: INTERNAL MEDICINE

## 2020-07-20 PROCEDURE — 160041 HCHG SURGERY MINUTES - EA ADDL 1 MIN LEVEL 4: Performed by: SURGERY

## 2020-07-20 PROCEDURE — 700102 HCHG RX REV CODE 250 W/ 637 OVERRIDE(OP): Performed by: ANESTHESIOLOGY

## 2020-07-20 DEVICE — TISSUE MATRIX ALLODERM PERFORATED SINGLE READY TO USE MEDIUM 1.5MM-1.8MM: Type: IMPLANTABLE DEVICE | Site: BREAST | Status: FUNCTIONAL

## 2020-07-20 DEVICE — IMPLANTABLE DEVICE: Type: IMPLANTABLE DEVICE | Site: BREAST | Status: FUNCTIONAL

## 2020-07-20 RX ORDER — DIPHENHYDRAMINE HYDROCHLORIDE 50 MG/ML
12.5 INJECTION INTRAMUSCULAR; INTRAVENOUS
Status: DISCONTINUED | OUTPATIENT
Start: 2020-07-20 | End: 2020-07-20 | Stop reason: HOSPADM

## 2020-07-20 RX ORDER — BACITRACIN 50000 [IU]/1
INJECTION, POWDER, FOR SOLUTION INTRAMUSCULAR
Status: DISCONTINUED | OUTPATIENT
Start: 2020-07-20 | End: 2020-07-20 | Stop reason: HOSPADM

## 2020-07-20 RX ORDER — HALOPERIDOL 5 MG/ML
1 INJECTION INTRAMUSCULAR
Status: DISCONTINUED | OUTPATIENT
Start: 2020-07-20 | End: 2020-07-20 | Stop reason: HOSPADM

## 2020-07-20 RX ORDER — HYDROMORPHONE HYDROCHLORIDE 1 MG/ML
0.1 INJECTION, SOLUTION INTRAMUSCULAR; INTRAVENOUS; SUBCUTANEOUS
Status: DISCONTINUED | OUTPATIENT
Start: 2020-07-20 | End: 2020-07-20 | Stop reason: HOSPADM

## 2020-07-20 RX ORDER — HYDROMORPHONE HYDROCHLORIDE 1 MG/ML
0.2 INJECTION, SOLUTION INTRAMUSCULAR; INTRAVENOUS; SUBCUTANEOUS
Status: DISCONTINUED | OUTPATIENT
Start: 2020-07-20 | End: 2020-07-20 | Stop reason: HOSPADM

## 2020-07-20 RX ORDER — CEFAZOLIN SODIUM 1 G/3ML
INJECTION, POWDER, FOR SOLUTION INTRAMUSCULAR; INTRAVENOUS
Status: DISCONTINUED | OUTPATIENT
Start: 2020-07-20 | End: 2020-07-20 | Stop reason: HOSPADM

## 2020-07-20 RX ORDER — OXYCODONE HCL 5 MG/5 ML
5 SOLUTION, ORAL ORAL
Status: COMPLETED | OUTPATIENT
Start: 2020-07-20 | End: 2020-07-20

## 2020-07-20 RX ORDER — ONDANSETRON 2 MG/ML
4 INJECTION INTRAMUSCULAR; INTRAVENOUS
Status: DISCONTINUED | OUTPATIENT
Start: 2020-07-20 | End: 2020-07-20 | Stop reason: HOSPADM

## 2020-07-20 RX ORDER — OXYCODONE HCL 5 MG/5 ML
10 SOLUTION, ORAL ORAL
Status: COMPLETED | OUTPATIENT
Start: 2020-07-20 | End: 2020-07-20

## 2020-07-20 RX ORDER — GENTAMICIN SULFATE 40 MG/ML
INJECTION, SOLUTION INTRAMUSCULAR; INTRAVENOUS
Status: DISCONTINUED | OUTPATIENT
Start: 2020-07-20 | End: 2020-07-20 | Stop reason: HOSPADM

## 2020-07-20 RX ORDER — LIDOCAINE HYDROCHLORIDE 10 MG/ML
INJECTION, SOLUTION EPIDURAL; INFILTRATION; INTRACAUDAL; PERINEURAL
Status: COMPLETED
Start: 2020-07-20 | End: 2020-07-20

## 2020-07-20 RX ORDER — CEFAZOLIN SODIUM 1 G/3ML
INJECTION, POWDER, FOR SOLUTION INTRAMUSCULAR; INTRAVENOUS PRN
Status: DISCONTINUED | OUTPATIENT
Start: 2020-07-20 | End: 2020-07-20 | Stop reason: SURG

## 2020-07-20 RX ORDER — SODIUM CHLORIDE, SODIUM LACTATE, POTASSIUM CHLORIDE, CALCIUM CHLORIDE 600; 310; 30; 20 MG/100ML; MG/100ML; MG/100ML; MG/100ML
INJECTION, SOLUTION INTRAVENOUS CONTINUOUS
Status: DISCONTINUED | OUTPATIENT
Start: 2020-07-20 | End: 2020-07-20 | Stop reason: HOSPADM

## 2020-07-20 RX ORDER — BUPIVACAINE HYDROCHLORIDE AND EPINEPHRINE 5; 5 MG/ML; UG/ML
INJECTION, SOLUTION EPIDURAL; INTRACAUDAL; PERINEURAL
Status: DISCONTINUED | OUTPATIENT
Start: 2020-07-20 | End: 2020-07-20 | Stop reason: HOSPADM

## 2020-07-20 RX ORDER — HYDROMORPHONE HYDROCHLORIDE 1 MG/ML
0.4 INJECTION, SOLUTION INTRAMUSCULAR; INTRAVENOUS; SUBCUTANEOUS
Status: DISCONTINUED | OUTPATIENT
Start: 2020-07-20 | End: 2020-07-20 | Stop reason: HOSPADM

## 2020-07-20 RX ORDER — LIDOCAINE HYDROCHLORIDE 20 MG/ML
INJECTION, SOLUTION EPIDURAL; INFILTRATION; INTRACAUDAL; PERINEURAL PRN
Status: DISCONTINUED | OUTPATIENT
Start: 2020-07-20 | End: 2020-07-20 | Stop reason: SURG

## 2020-07-20 RX ORDER — MEPERIDINE HYDROCHLORIDE 25 MG/ML
6.25 INJECTION INTRAMUSCULAR; INTRAVENOUS; SUBCUTANEOUS
Status: DISCONTINUED | OUTPATIENT
Start: 2020-07-20 | End: 2020-07-20 | Stop reason: HOSPADM

## 2020-07-20 RX ORDER — ONDANSETRON 2 MG/ML
INJECTION INTRAMUSCULAR; INTRAVENOUS PRN
Status: DISCONTINUED | OUTPATIENT
Start: 2020-07-20 | End: 2020-07-20 | Stop reason: SURG

## 2020-07-20 RX ADMIN — OXYCODONE HYDROCHLORIDE 10 MG: 5 SOLUTION ORAL at 16:07

## 2020-07-20 RX ADMIN — Medication 2.5 MG: at 15:59

## 2020-07-20 RX ADMIN — FENTANYL CITRATE 25 MCG: 50 INJECTION INTRAMUSCULAR; INTRAVENOUS at 16:09

## 2020-07-20 RX ADMIN — ALBUTEROL SULFATE 2.5 MG: 2.5 SOLUTION RESPIRATORY (INHALATION) at 15:59

## 2020-07-20 RX ADMIN — FENTANYL CITRATE 250 MCG: 50 INJECTION, SOLUTION INTRAMUSCULAR; INTRAVENOUS at 13:40

## 2020-07-20 RX ADMIN — LIDOCAINE HYDROCHLORIDE 0.5 ML: 10 INJECTION, SOLUTION EPIDURAL; INFILTRATION; INTRACAUDAL at 12:35

## 2020-07-20 RX ADMIN — Medication 0.5 ML: at 12:35

## 2020-07-20 RX ADMIN — HYDROMORPHONE HYDROCHLORIDE 0.2 MG: 1 INJECTION, SOLUTION INTRAMUSCULAR; INTRAVENOUS; SUBCUTANEOUS at 16:45

## 2020-07-20 RX ADMIN — FENTANYL CITRATE 25 MCG: 50 INJECTION INTRAMUSCULAR; INTRAVENOUS at 16:40

## 2020-07-20 RX ADMIN — SODIUM CHLORIDE, POTASSIUM CHLORIDE, SODIUM LACTATE AND CALCIUM CHLORIDE: 600; 310; 30; 20 INJECTION, SOLUTION INTRAVENOUS at 12:35

## 2020-07-20 RX ADMIN — HYDROMORPHONE HYDROCHLORIDE 0.4 MG: 1 INJECTION, SOLUTION INTRAMUSCULAR; INTRAVENOUS; SUBCUTANEOUS at 16:55

## 2020-07-20 RX ADMIN — ONDANSETRON 4 MG: 2 INJECTION INTRAMUSCULAR; INTRAVENOUS at 15:49

## 2020-07-20 RX ADMIN — CEFAZOLIN 2 G: 330 INJECTION, POWDER, FOR SOLUTION INTRAMUSCULAR; INTRAVENOUS at 13:46

## 2020-07-20 RX ADMIN — HYDROMORPHONE HYDROCHLORIDE 0.4 MG: 1 INJECTION, SOLUTION INTRAMUSCULAR; INTRAVENOUS; SUBCUTANEOUS at 16:50

## 2020-07-20 RX ADMIN — FENTANYL CITRATE 25 MCG: 50 INJECTION INTRAMUSCULAR; INTRAVENOUS at 16:35

## 2020-07-20 RX ADMIN — MEPERIDINE HYDROCHLORIDE 6.25 MG: 25 INJECTION INTRAMUSCULAR; INTRAVENOUS; SUBCUTANEOUS at 16:22

## 2020-07-20 RX ADMIN — PROPOFOL 200 MG: 10 INJECTION, EMULSION INTRAVENOUS at 13:40

## 2020-07-20 RX ADMIN — LIDOCAINE HYDROCHLORIDE 100 MG: 20 INJECTION, SOLUTION EPIDURAL; INFILTRATION; INTRACAUDAL at 13:40

## 2020-07-20 RX ADMIN — FENTANYL CITRATE 25 MCG: 50 INJECTION INTRAMUSCULAR; INTRAVENOUS at 16:15

## 2020-07-20 RX ADMIN — MEPERIDINE HYDROCHLORIDE 6.25 MG: 25 INJECTION INTRAMUSCULAR; INTRAVENOUS; SUBCUTANEOUS at 16:30

## 2020-07-20 ASSESSMENT — FIBROSIS 4 INDEX: FIB4 SCORE: 0.32

## 2020-07-20 NOTE — ANESTHESIA PROCEDURE NOTES
Airway    Date/Time: 7/20/2020 1:41 PM  Performed by: Henri Nguyen M.D.  Authorized by: Henri Nguyen M.D.     Location:  OR  Urgency:  Elective  Indications for Airway Management:  Anesthesia      Spontaneous Ventilation: absent    Sedation Level:  Deep  Preoxygenated: Yes    Final Airway Type:  Supraglottic airway  Final Supraglottic Airway:  Standard LMA    SGA Size:  3  Number of Attempts at Approach:  1

## 2020-07-20 NOTE — ANESTHESIA PREPROCEDURE EVALUATION
Relevant Problems   CARDIAC   (+) Hypertension      GI   (+) GERD (gastroesophageal reflux disease)       Physical Exam    Airway   Mallampati: II  TM distance: >3 FB  Neck ROM: full       Cardiovascular - normal exam  Rhythm: regular  Rate: normal  (-) murmur     Dental - normal exam           Pulmonary - normal exam  Breath sounds clear to auscultation     Abdominal    Neurological - normal exam                 Anesthesia Plan    ASA 3   ASA physical status 3 criteria: morbid obesity - BMI greater than or equal to 40    Plan - general       Airway plan will be LMA        Induction: intravenous    Postoperative Plan: Postoperative administration of opioids is intended.    Pertinent diagnostic labs and testing reviewed    Informed Consent:    Anesthetic plan and risks discussed with patient.    Use of blood products discussed with: patient whom consented to blood products.

## 2020-07-20 NOTE — ANESTHESIA TIME REPORT
Anesthesia Start and Stop Event Times     Date Time Event    7/20/2020 1332 Ready for Procedure     1337 Anesthesia Start     1601 Anesthesia Stop        Responsible Staff  07/20/20    Name Role Begin End    Henri Nguyen M.D. Anesth 1337 1601        Preop Diagnosis (Free Text):  Pre-op Diagnosis     ACQUIRED ABSENCE OF BILATERAL BREASTS        Preop Diagnosis (Codes):    Post op Diagnosis  Absence of both breasts      Premium Reason  Non-Premium    Comments:

## 2020-07-20 NOTE — ANESTHESIA POSTPROCEDURE EVALUATION
Patient: Melanie Leyva    Procedure Summary     Date:  07/20/20 Room / Location:  [place] [address] / SURGERY [facility]    Anesthesia Start:  1337 Anesthesia Stop:  1550    Procedures:       RECONSTRUCTION, BREAST (Bilateral Breast)      INSERTION, TISSUE EXPANDER-AND ALLODERM (Bilateral Breast) Diagnosis:  (ACQUIRED ABSENCE OF BILATERAL BREASTS)    Surgeon:  Guy Starks M.D. Responsible Provider:  Henri Nguyen M.D.    Anesthesia Type:  general ASA Status:  3          Final Anesthesia Type: general  Last vitals  BP   Blood Pressure: 111/66    Temp   36.3 °C (97.3 °F)    Pulse   Pulse: 90   Resp   18    SpO2   94 %      Anesthesia Post Evaluation     Nurse Pain Score: 0 (NPRS)

## 2020-07-20 NOTE — ANESTHESIA POSTPROCEDURE EVALUATION
Patient: Melanie Leyva    Procedure Summary     Date:  07/20/20 Room / Location:  [place] [address] / SURGERY [facility]    Anesthesia Start:  1337 Anesthesia Stop:  1550    Procedures:       RECONSTRUCTION, BREAST (Bilateral Breast)      INSERTION, TISSUE EXPANDER-AND ALLODERM (Bilateral Breast) Diagnosis:  (ACQUIRED ABSENCE OF BILATERAL BREASTS)    Surgeon:  Guy Starks M.D. Responsible Provider:  Henri Nguyen M.D.    Anesthesia Type:  general ASA Status:  3          Final Anesthesia Type: general  Last vitals  BP   Blood Pressure: 111/66    Temp   36.3 °C (97.3 °F)    Pulse   Pulse: 90   Resp   18    SpO2   94 %      Anesthesia Post Evaluation    Patient location during evaluation: PACU  Patient participation: complete - patient participated  Level of consciousness: awake and alert    Airway patency: patent  Anesthetic complications: no  Cardiovascular status: hemodynamically stable  Respiratory status: acceptable  Hydration status: euvolemic    PONV: none           Nurse Pain Score: 0 (NPRS)

## 2020-07-21 LAB
EST. AVERAGE GLUCOSE BLD GHB EST-MCNC: 146 MG/DL
HBA1C MFR BLD: 6.7 % (ref 0–5.6)

## 2020-07-21 NOTE — OP REPORT
DATE OF SERVICE:  07/20/2020    PREOPERATIVE DIAGNOSIS:  Acquired absence of bilateral breasts after bilateral   total mastectomies for left breast cancer.    POSTOPERATIVE DIAGNOSIS:  Acquired absence of bilateral breasts after   bilateral total mastectomies for left breast cancer.    PROCEDURE PERFORMED:  Delayed bilateral breast reconstruction with tissue   expanders and acellular dermal matrix placement.    SURGEON:  Guy Starks MD    ASSISTANT:  None.    ANESTHESIOLOGIST:  Henir Nguyen MD    ANESTHESIA TYPE:  General.    SPECIMENS:  None.    DRAINS:  A 10-Palestinian ERON drain was placed in both breasts.    ESTIMATED BLOOD LOSS:  50 mL.    COMPLICATIONS:  None.    IMPLANTS:  Natrelle 600 mL tissue expanders were placed, reference   #[id number].  On the right, serial #[id number] and on the left, serial   #[id number].  These were not filled with any saline intraoperatively.    BRIEF HISTORY:  This is a 59-year-old female who last year was diagnosed with   left breast cancer.  She saw Dr. Marmolejo for her surgical treatment options   and elected for bilateral total mastectomies.  Of note, the patient does have   a history of an immunodeficiency for which she received IVIG.  Unfortunately   and presumably because of this, the patient had wound healing issues with both   breasts after the mastectomies.  She ultimately was left with open wounds   requiring wound VAC for eventual closure.  Her mastectomy was in 04/2019, and   her wounds were finally closed in 09/2019.  After some delay, she then saw me   for delayed reconstruction options.  We discussed performing bilateral breast   reconstruction with tissue expanders and AlloDerm.  She understands that this   is a staged procedure and she will require serial visits to the office for   expansion.  Additional risks, benefits, and alternatives were discussed with   her, risks including bleeding, infection, anesthesia risks, wound healing   issues, especially given  her past history of wound healing issues, potential   infection or extrusion of the expanders, again tightened because of her   previous infection, hematoma or seroma formation, expander malposition or   malrotation, asymmetries, potential necrosis of the overlying skin flaps, and   poor cosmetic outcome.  She stated she understood and wished to proceed.    PROCEDURE IN DETAIL:  After informed consent was obtained, patient was taken   to the operating room and placed on the table in supine position.  After   induction of general anesthesia, her chest was prepped and draped in the usual   sterile fashion.  A timeout was called correctly identifying the patient and   procedure.  I began with the right side.  I excised a portion of her previous   transverse mastectomy scar, which was significantly widened because of the   wound healing issues.  Dissection was carried down through the underlying scar   tissue until the pectoralis major muscle was identified.  I raised skin flaps   along the inferior pole of the breast down to the premarked inframammary   crease.  I then created a subpectoral pocket by elevating the pectoralis   muscle, starting at the lateral border.  This was done with direct   visualization using electrocautery.  The inferior fibers of the pectoralis   were divided up to the midline.  Once an adequate sized pocket was created, I   then irrigated the wound with triple antibiotic solution of Ancef, bacitracin   and gentamicin in sterile saline.  I then prepped a sheet of AlloDerm by   rinsing it with antibiotic solution and I sutured the inferior border of the   AlloDerm to the inframammary crease with 2-0 Vicryl sutures.  After a final   irrigation and confirmation of hemostasis, I then prepped the above-mentioned   tissue expander by evacuating all of the air and bathing it in the triple   antibiotic solution.  After change of gloves, the expander was put into   position and sutured in place with the  supplied suture tabs using 2-0 Vicryl   sutures.  I then closed the superior border of the AlloDerm to the inferior   border of the pectoralis muscle with additional Vicryl sutures.  The lateral   border of the AlloDerm was sutured to the serratus fascia.  This provided full   coverage of the tissue expander.  A 10-Arabic drain was then placed through a   separate lateral stab incision.  The skin was then closed using a couple of   2-0 Vicryl sutures in the deep dermis for tension release, followed by 3-0   Monocryl deep dermal and 4-0 Monocryl subcuticular sutures.  The mirror   procedure was then performed on the left.  All the incisions were dressed with   Steri-Strips, ABD pads, and she was placed in a light compressive elastic   wrap.  The patient tolerated the procedure well and there were no   complications.  Sponge and instrument count were correct at the end of the   case.  She was extubated and taken to the recovery room in good condition.       ____________________________________     OMER HOYT MD P[facility] / NTS    DD:  07/20/2020 16:00:48  DT:  07/20/2020 17:03:57    D#:  [id number]  Job#:  [id number]

## 2020-07-21 NOTE — OR NURSING
Pt AA/Ox4. VSS. Dressing to bilateral breasts, CDI. Pt reports 4-5/10 pain scale at this time. Pain medications given. Pt denies numbness or tingling. No nausea or vomiting. SCDs in place. Intact left and right ERON drains to breast area.    Pt's , Chong, updated regarding plan of care.    Report given to STAN Guevara.    Pt via raya was transferred to PACU2 at 1715.

## 2020-07-21 NOTE — OR NURSING
"Assumed care of patient at approx 1715.  Patient alert and oriented x 4. See flowsheets for VS.  Pain is rated 6/10 \"tolerable\"     Call light and personal belongings within reach. Manny in lowest position. Monitor alarms set appropriately.    Dressing is CDI . See flowsheets for detailed wound documentation.   2 ERON drains in place.           "

## 2020-07-21 NOTE — OR NURSING
Elastic wrap DSG/gauze CDI around chest with two ERON drains present from left and right breast/chest area with serous sanguineous drainage present in drains, VSS, pt drinking PO fluids, states pain is tolerable at 4-5 of 10 level across chest surgical area, denies SOB or nausea, d/c instructions reviewed with pt and , they verbalize understanding of ERON drain care, PIV removed, tip intact, discharged via w/c to [facility] with  at 1800.

## 2020-07-21 NOTE — DISCHARGE INSTRUCTIONS
ACTIVITY: Rest and take it easy for the first 24 hours.  A responsible adult is recommended to remain with you during that time.  It is normal to feel sleepy.  We encourage you to not do anything that requires balance, judgment or coordination.    MILD FLU-LIKE SYMPTOMS ARE NORMAL. YOU MAY EXPERIENCE GENERALIZED MUSCLE ACHES, THROAT IRRITATION, HEADACHE AND/OR SOME NAUSEA.    FOR 24 HOURS DO NOT:  Drive, operate machinery or run household appliances.  Drink beer or alcoholic beverages.   Make important decisions or sign legal documents.    SPECIAL INSTRUCTIONS:     See [facility] Instructions    Surgical Drain Home Care  Surgical drains are used to remove extra fluid that normally builds up in a surgical wound after surgery. A surgical drain helps to heal a surgical wound. Different kinds of surgical drains include:  · Active drains. These drains use suction to pull drainage away from the surgical wound. Drainage flows through a tube to a container outside of the body. With these drains, you need to keep the bulb or the drainage container flat (compressed) at all times, except while you empty it. Flattening the bulb or container creates suction.  · Passive drains. These drains allow fluid to drain naturally, by gravity. Drainage flows through a tube to a bandage (dressing) or a container outside of the body. Passive drains do not need to be emptied.  A drain is placed during surgery. Right after surgery, drainage is usually bright red and a little thicker than water. The drainage may gradually turn yellow or pink and become thinner. It is likely that your health care provider will remove the drain when the drainage stops or when the amount decreases to 1-2 Tbsp (15-30 mL) during a 24-hour period.  Supplies needed:  · Tape.  · Germ-free cleaning solution (sterile saline).  · Cotton swabs.  · Split gauze drain sponge: 4 x 4 inches (10 x 10 cm).  · Gauze square: 4 x 4 inches (10 x 10 cm).  How to care for  your surgical drain  Care for your drain as told by your health care provider. This is important to help prevent infection. If your drain is placed at your back, or any other hard-to-reach area, ask another person to assist you in performing the following tasks:  General care  · Keep the skin around the drain dry and covered with a dressing at all times.  · Check your drain area every day for signs of infection. Check for:  ? Redness, swelling, or pain.  ? Pus or a bad smell.  ? Cloudy drainage.  ? Tenderness or pressure at the drain exit site.  Changing the dressing  Follow instructions from your health care provider about how to change your dressing. Change your dressing at least once a day. Change it more often if needed to keep the dressing dry. Make sure you:  1. Gather your supplies.  2. Wash your hands with soap and water before you change your dressing. If soap and water are not available, use hand .  3. Remove the old dressing. Avoid using scissors to do that.  4. Wash your hands with soap and water again after removing the old dressing.  5. Use sterile saline to clean your skin around the drain. You may need to use a cotton swab to clean the skin.  6. Place the tube through the slit in a drain sponge. Place the drain sponge so that it covers your wound.  7. Place the gauze square or another drain sponge on top of the drain sponge that is on the wound. Make sure the tube is between those layers.  8. Tape the dressing to your skin.  9. Tape the drainage tube to your skin 1-2 inches (2.5-5 cm) below the place where the tube enters your body. Taping keeps the tube from pulling on any stitches (sutures) that you have.  10. Wash your hands with soap and water.  11. Write down the color of your drainage and how often you change your dressing.  How to empty your active drain    1. Make sure that you have a measuring cup that you can empty your drainage into.  2. Wash your hands with soap and water. If  soap and water are not available, use hand .  3. Loosen any pins or clips that hold the tube in place.  4. If your health care provider tells you to strip the tube to prevent clots and tube blockages:  ? Hold the tube at the skin with one hand. Use your other hand to pinch the tubing with your thumb and first finger.  ? Gently move your fingers down the tube while squeezing very lightly. This clears any drainage, clots, or tissue from the tube.  ? You may need to do this several times each day to keep the tube clear. Do not pull on the tube.  5. Open the bulb cap or the drain plug. Do not touch the inside of the cap or the bottom of the plug.  6. Turn the device upside down and gently squeeze.  7. Empty all of the drainage into the measuring cup.  8. Compress the bulb or the container and replace the cap or the plug. To compress the bulb or the container, squeeze it firmly in the middle while you close the cap or plug the container.  9. Write down the amount of drainage that you have in each 24-hour period. If you have less than 2 Tbsp (30 mL) of drainage during 24 hours, contact your health care provider.  10. Flush the drainage down the toilet.  11. Wash your hands with soap and water.  Contact a health care provider if:  · You have redness, swelling, or pain around your drain area.  · You have pus or a bad smell coming from your drain area.  · You have a fever or chills.  · The skin around your drain is warm to the touch.  · The amount of drainage that you have is increasing instead of decreasing.  · You have drainage that is cloudy.  · There is a sudden stop or a sudden decrease in the amount of drainage that you have.  · Your drain tube falls out.  · Your active drain does not stay compressed after you empty it.  Summary  · Surgical drains are used to remove extra fluid that normally builds up in a surgical wound after surgery.  · Different kinds of surgical drains include active drains and passive  drains. Active drains use suction to pull drainage away from the surgical wound, and passive drains allow fluid to drain naturally.  · It is important to care for your drain to prevent infection. If your drain is placed at your back, or any other hard-to-reach area, ask another person to assist you.  · Contact your health care provider if you have redness, swelling, or pain around your drain area.  This information is not intended to replace advice given to you by your health care provider. Make sure you discuss any questions you have with your health care provider.      Surgical Drain Record  Empty your surgical drain as told by your health care provider. Use this form to write down the amount of fluid that has collected in the drainage container. Bring this form with you to your follow-up visits.  Surgical drain #1 location: ___________________    Date __________ Time __________ Amount __________  Date __________ Time __________ Amount __________  Date __________ Time __________ Amount __________  Date __________ Time __________ Amount __________  Date __________ Time __________ Amount __________  Date __________ Time __________ Amount __________  Date __________ Time __________ Amount __________  Date __________ Time __________ Amount __________  Date __________ Time __________ Amount __________  Date __________ Time __________ Amount __________  Date __________ Time __________ Amount __________  Date __________ Time __________ Amount __________  Date __________ Time __________ Amount __________  Date __________ Time __________ Amount __________  Date __________ Time __________ Amount __________  Date __________ Time __________ Amount __________  Date __________ Time __________ Amount __________  Date __________ Time __________ Amount __________  Date __________ Time __________ Amount __________  Date __________ Time __________ Amount __________  Date __________ Time __________ Amount __________  Surgical drain  #2 location: ___________________  Date __________ Time __________ Amount __________  Date __________ Time __________ Amount __________  Date __________ Time __________ Amount __________  Date __________ Time __________ Amount __________  Date __________ Time __________ Amount __________  Date __________ Time __________ Amount __________  Date __________ Time __________ Amount __________  Date __________ Time __________ Amount __________  Date __________ Time __________ Amount __________  Date __________ Time __________ Amount __________  Date __________ Time __________ Amount __________  Date __________ Time __________ Amount __________  Date __________ Time __________ Amount __________  Date __________ Time __________ Amount __________  Date __________ Time __________ Amount __________  Date __________ Time __________ Amount __________  Date __________ Time __________ Amount __________  Date __________ Time __________ Amount __________  Date __________ Time __________ Amount __________  Date __________ Time __________ Amount __________  Date __________ Time __________ Amount __________  This information is not intended to replace advice given to you by your health care provider. Make sure you discuss any questions you have with your health care provider.        DIET: To avoid nausea, slowly advance diet as tolerated, avoiding spicy or greasy foods for the first day.  Add more substantial food to your diet according to your physician's instructions.  INCREASE FLUIDS AND FIBER TO AVOID CONSTIPATION.    SURGICAL DRESSING/BATHING: Keep dressing clean and dry until follow up appointment    FOLLOW-UP APPOINTMENT:  A follow-up appointment should be arranged with your doctor in 1-2 weeks; call to schedule.    You should CALL YOUR PHYSICIAN if you develop:  Fever greater than 101 degrees F.  Pain not relieved by medication, or persistent nausea or vomiting.  Excessive bleeding (blood soaking through dressing) or unexpected  drainage from the wound.  Extreme redness or swelling around the incision site, drainage of pus or foul smelling drainage.  Inability to urinate or empty your bladder within 8 hours.  Problems with breathing or chest pain.    You should call 911 if you develop problems with breathing or chest pain.  If you are unable to contact your doctor or surgical center, you should go to the nearest emergency room or urgent care center.    Physician's telephone #: [phone] Dr Shah    If any questions arise, call your doctor.  If your doctor is not available, please feel free to call the Surgical Center at [phone].  The Center is open Monday through Friday from 7AM to 7PM.  You can also call the HEALTH HOTLINE open 24 hours/day, 7 days/week and speak to a nurse at [phone], or toll free at [phone].    A registered nurse may call you a few days after your surgery to see how you are doing after your procedure.    MEDICATIONS: Resume taking daily medication.  Take prescribed pain medication with food.  If no medication is prescribed, you may take non-aspirin pain medication if needed.  PAIN MEDICATION CAN BE VERY CONSTIPATING.  Take a stool softener or laxative such as senokot, pericolace, or milk of magnesia if needed.    Prescription given for Norco, Keflex and Zofran.  Last pain medication given at Oxycodone 10mg at 4:07pm.    If your physician has prescribed pain medication that includes Acetaminophen (Tylenol), do not take additional Acetaminophen (Tylenol) while taking the prescribed medication.    Depression / Suicide Risk    As you are discharged from this [facility] facility, it is important to learn how to keep safe from harming yourself.    Recognize the warning signs:  · Abrupt changes in personality, positive or negative- including increase in energy   · Giving away possessions  · Change in eating patterns- significant weight changes-  positive or negative  · Change in sleeping patterns-  unable to sleep or sleeping all the time   · Unwillingness or inability to communicate  · Depression  · Unusual sadness, discouragement and loneliness  · Talk of wanting to die  · Neglect of personal appearance   · Rebelliousness- reckless behavior  · Withdrawal from people/activities they love  · Confusion- inability to concentrate     If you or a loved one observes any of these behaviors or has concerns about self-harm, here's what you can do:  · Talk about it- your feelings and reasons for harming yourself  · Remove any means that you might use to hurt yourself (examples: pills, rope, extension cords, firearm)  · Get professional help from the community (Mental Health, Substance Abuse, psychological counseling)  · Do not be alone:Call your Safe Contact- someone whom you trust who will be there for you.  · Call your local CRISIS HOTLINE 239-1963 or 038-544-3628  · Call your local Children's Mobile Crisis Response Team [place] (304) 314-9710 or www.[employer]  · Call the toll free National Suicide Prevention Hotlines   · National Suicide Prevention Lifeline 469-544-ZAQS (3896)  · National Hope Line Network 800-SUICIDE (506-3092)

## 2020-09-02 ENCOUNTER — PRE-ADMISSION TESTING (OUTPATIENT)
Dept: ADMISSIONS | Facility: MEDICAL CENTER | Age: 60
End: 2020-09-02
Payer: COMMERCIAL

## 2020-09-02 DIAGNOSIS — Z01.812 PRE-OPERATIVE LABORATORY EXAMINATION: ICD-10-CM

## 2020-09-02 LAB
ANION GAP SERPL CALC-SCNC: 14 MMOL/L (ref 7–16)
BUN SERPL-MCNC: 17 MG/DL (ref 8–22)
CALCIUM SERPL-MCNC: 10 MG/DL (ref 8.5–10.5)
CHLORIDE SERPL-SCNC: 102 MMOL/L (ref 96–112)
CO2 SERPL-SCNC: 25 MMOL/L (ref 20–33)
COVID ORDER STATUS COVID19: NORMAL
CREAT SERPL-MCNC: 1.05 MG/DL (ref 0.5–1.4)
GLUCOSE SERPL-MCNC: 189 MG/DL (ref 65–99)
POTASSIUM SERPL-SCNC: 4.2 MMOL/L (ref 3.6–5.5)
SARS-COV-2 RDRP RESP QL NAA+PROBE: NOTDETECTED
SODIUM SERPL-SCNC: 141 MMOL/L (ref 135–145)
SPECIMEN SOURCE: NORMAL

## 2020-09-02 PROCEDURE — 80048 BASIC METABOLIC PNL TOTAL CA: CPT

## 2020-09-02 PROCEDURE — 36415 COLL VENOUS BLD VENIPUNCTURE: CPT

## 2020-09-02 PROCEDURE — U0004 COV-19 TEST NON-CDC HGH THRU: HCPCS

## 2020-09-03 ENCOUNTER — HOSPITAL ENCOUNTER (OUTPATIENT)
Facility: MEDICAL CENTER | Age: 60
End: 2020-09-03
Attending: SURGERY | Admitting: SURGERY
Payer: COMMERCIAL

## 2020-09-03 ENCOUNTER — ANESTHESIA (OUTPATIENT)
Dept: SURGERY | Facility: MEDICAL CENTER | Age: 60
End: 2020-09-03
Payer: COMMERCIAL

## 2020-09-03 ENCOUNTER — ANESTHESIA EVENT (OUTPATIENT)
Dept: SURGERY | Facility: MEDICAL CENTER | Age: 60
End: 2020-09-03
Payer: COMMERCIAL

## 2020-09-03 VITALS
OXYGEN SATURATION: 93 % | HEIGHT: 63 IN | RESPIRATION RATE: 16 BRPM | DIASTOLIC BLOOD PRESSURE: 48 MMHG | HEART RATE: 72 BPM | BODY MASS INDEX: 37.62 KG/M2 | SYSTOLIC BLOOD PRESSURE: 118 MMHG | WEIGHT: 212.3 LBS | TEMPERATURE: 97.3 F

## 2020-09-03 LAB — GLUCOSE BLD-MCNC: 85 MG/DL (ref 65–99)

## 2020-09-03 PROCEDURE — 500389 HCHG DRAIN, RESERVOIR SUCT JP 100CC: Performed by: SURGERY

## 2020-09-03 PROCEDURE — 160046 HCHG PACU - 1ST 60 MINS PHASE II: Performed by: SURGERY

## 2020-09-03 PROCEDURE — 700111 HCHG RX REV CODE 636 W/ 250 OVERRIDE (IP): Performed by: ANESTHESIOLOGY

## 2020-09-03 PROCEDURE — 87186 SC STD MICRODIL/AGAR DIL: CPT

## 2020-09-03 PROCEDURE — 87075 CULTR BACTERIA EXCEPT BLOOD: CPT

## 2020-09-03 PROCEDURE — 501838 HCHG SUTURE GENERAL: Performed by: SURGERY

## 2020-09-03 PROCEDURE — 87205 SMEAR GRAM STAIN: CPT

## 2020-09-03 PROCEDURE — 160048 HCHG OR STATISTICAL LEVEL 1-5: Performed by: SURGERY

## 2020-09-03 PROCEDURE — 160002 HCHG RECOVERY MINUTES (STAT): Performed by: SURGERY

## 2020-09-03 PROCEDURE — 700101 HCHG RX REV CODE 250: Performed by: ANESTHESIOLOGY

## 2020-09-03 PROCEDURE — 700105 HCHG RX REV CODE 258: Performed by: SURGERY

## 2020-09-03 PROCEDURE — 160036 HCHG PACU - EA ADDL 30 MINS PHASE I: Performed by: SURGERY

## 2020-09-03 PROCEDURE — A9270 NON-COVERED ITEM OR SERVICE: HCPCS | Performed by: ANESTHESIOLOGY

## 2020-09-03 PROCEDURE — 160039 HCHG SURGERY MINUTES - EA ADDL 1 MIN LEVEL 3: Performed by: SURGERY

## 2020-09-03 PROCEDURE — 700102 HCHG RX REV CODE 250 W/ 637 OVERRIDE(OP): Performed by: ANESTHESIOLOGY

## 2020-09-03 PROCEDURE — 87070 CULTURE OTHR SPECIMN AEROBIC: CPT

## 2020-09-03 PROCEDURE — 82962 GLUCOSE BLOOD TEST: CPT

## 2020-09-03 PROCEDURE — 160025 RECOVERY II MINUTES (STATS): Performed by: SURGERY

## 2020-09-03 PROCEDURE — 700101 HCHG RX REV CODE 250: Performed by: SURGERY

## 2020-09-03 PROCEDURE — 501445 HCHG STAPLER, SKIN DISP: Performed by: SURGERY

## 2020-09-03 PROCEDURE — 500371 HCHG DRAIN, BLAKE 10MM: Performed by: SURGERY

## 2020-09-03 PROCEDURE — 700105 HCHG RX REV CODE 258: Performed by: ANESTHESIOLOGY

## 2020-09-03 PROCEDURE — 87077 CULTURE AEROBIC IDENTIFY: CPT

## 2020-09-03 PROCEDURE — 87147 CULTURE TYPE IMMUNOLOGIC: CPT

## 2020-09-03 PROCEDURE — A6402 STERILE GAUZE <= 16 SQ IN: HCPCS | Performed by: SURGERY

## 2020-09-03 PROCEDURE — 160009 HCHG ANES TIME/MIN: Performed by: SURGERY

## 2020-09-03 PROCEDURE — 160035 HCHG PACU - 1ST 60 MINS PHASE I: Performed by: SURGERY

## 2020-09-03 PROCEDURE — 160028 HCHG SURGERY MINUTES - 1ST 30 MINS LEVEL 3: Performed by: SURGERY

## 2020-09-03 RX ORDER — LIDOCAINE HYDROCHLORIDE 20 MG/ML
INJECTION, SOLUTION EPIDURAL; INFILTRATION; INTRACAUDAL; PERINEURAL PRN
Status: DISCONTINUED | OUTPATIENT
Start: 2020-09-03 | End: 2020-09-03 | Stop reason: SURG

## 2020-09-03 RX ORDER — DEXAMETHASONE SODIUM PHOSPHATE 4 MG/ML
INJECTION, SOLUTION INTRA-ARTICULAR; INTRALESIONAL; INTRAMUSCULAR; INTRAVENOUS; SOFT TISSUE PRN
Status: DISCONTINUED | OUTPATIENT
Start: 2020-09-03 | End: 2020-09-03 | Stop reason: SURG

## 2020-09-03 RX ORDER — HALOPERIDOL 5 MG/ML
1 INJECTION INTRAMUSCULAR
Status: DISCONTINUED | OUTPATIENT
Start: 2020-09-03 | End: 2020-09-03 | Stop reason: HOSPADM

## 2020-09-03 RX ORDER — GLYCOPYRROLATE 0.2 MG/ML
INJECTION INTRAMUSCULAR; INTRAVENOUS PRN
Status: DISCONTINUED | OUTPATIENT
Start: 2020-09-03 | End: 2020-09-03 | Stop reason: SURG

## 2020-09-03 RX ORDER — SODIUM CHLORIDE, SODIUM LACTATE, POTASSIUM CHLORIDE, CALCIUM CHLORIDE 600; 310; 30; 20 MG/100ML; MG/100ML; MG/100ML; MG/100ML
INJECTION, SOLUTION INTRAVENOUS CONTINUOUS
Status: DISCONTINUED | OUTPATIENT
Start: 2020-09-03 | End: 2020-09-03 | Stop reason: HOSPADM

## 2020-09-03 RX ORDER — METOCLOPRAMIDE HYDROCHLORIDE 5 MG/ML
INJECTION INTRAMUSCULAR; INTRAVENOUS PRN
Status: DISCONTINUED | OUTPATIENT
Start: 2020-09-03 | End: 2020-09-03 | Stop reason: SURG

## 2020-09-03 RX ORDER — HYDRALAZINE HYDROCHLORIDE 10 MG/1
10 TABLET, FILM COATED ORAL ONCE
Status: DISCONTINUED | OUTPATIENT
Start: 2020-09-03 | End: 2020-09-03 | Stop reason: HOSPADM

## 2020-09-03 RX ORDER — LABETALOL HYDROCHLORIDE 5 MG/ML
5 INJECTION, SOLUTION INTRAVENOUS
Status: DISCONTINUED | OUTPATIENT
Start: 2020-09-03 | End: 2020-09-03 | Stop reason: HOSPADM

## 2020-09-03 RX ORDER — HYDROMORPHONE HYDROCHLORIDE 1 MG/ML
0.4 INJECTION, SOLUTION INTRAMUSCULAR; INTRAVENOUS; SUBCUTANEOUS
Status: DISCONTINUED | OUTPATIENT
Start: 2020-09-03 | End: 2020-09-03 | Stop reason: HOSPADM

## 2020-09-03 RX ORDER — HYDROMORPHONE HYDROCHLORIDE 1 MG/ML
0.2 INJECTION, SOLUTION INTRAMUSCULAR; INTRAVENOUS; SUBCUTANEOUS
Status: DISCONTINUED | OUTPATIENT
Start: 2020-09-03 | End: 2020-09-03 | Stop reason: HOSPADM

## 2020-09-03 RX ORDER — MEPERIDINE HYDROCHLORIDE 25 MG/ML
12.5 INJECTION INTRAMUSCULAR; INTRAVENOUS; SUBCUTANEOUS
Status: DISCONTINUED | OUTPATIENT
Start: 2020-09-03 | End: 2020-09-03 | Stop reason: HOSPADM

## 2020-09-03 RX ORDER — CEFAZOLIN SODIUM 1 G/3ML
INJECTION, POWDER, FOR SOLUTION INTRAMUSCULAR; INTRAVENOUS PRN
Status: DISCONTINUED | OUTPATIENT
Start: 2020-09-03 | End: 2020-09-03 | Stop reason: SURG

## 2020-09-03 RX ORDER — ONDANSETRON 2 MG/ML
INJECTION INTRAMUSCULAR; INTRAVENOUS PRN
Status: DISCONTINUED | OUTPATIENT
Start: 2020-09-03 | End: 2020-09-03 | Stop reason: SURG

## 2020-09-03 RX ORDER — OXYCODONE HCL 5 MG/5 ML
5 SOLUTION, ORAL ORAL
Status: COMPLETED | OUTPATIENT
Start: 2020-09-03 | End: 2020-09-03

## 2020-09-03 RX ORDER — HYDROMORPHONE HYDROCHLORIDE 1 MG/ML
0.1 INJECTION, SOLUTION INTRAMUSCULAR; INTRAVENOUS; SUBCUTANEOUS
Status: DISCONTINUED | OUTPATIENT
Start: 2020-09-03 | End: 2020-09-03 | Stop reason: HOSPADM

## 2020-09-03 RX ORDER — NEOSTIGMINE METHYLSULFATE 1 MG/ML
INJECTION, SOLUTION INTRAVENOUS PRN
Status: DISCONTINUED | OUTPATIENT
Start: 2020-09-03 | End: 2020-09-03 | Stop reason: SURG

## 2020-09-03 RX ORDER — SODIUM CHLORIDE, SODIUM LACTATE, POTASSIUM CHLORIDE, CALCIUM CHLORIDE 600; 310; 30; 20 MG/100ML; MG/100ML; MG/100ML; MG/100ML
INJECTION, SOLUTION INTRAVENOUS
Status: DISCONTINUED | OUTPATIENT
Start: 2020-09-03 | End: 2020-09-03 | Stop reason: SURG

## 2020-09-03 RX ORDER — ONDANSETRON 2 MG/ML
4 INJECTION INTRAMUSCULAR; INTRAVENOUS
Status: DISCONTINUED | OUTPATIENT
Start: 2020-09-03 | End: 2020-09-03 | Stop reason: HOSPADM

## 2020-09-03 RX ORDER — METOPROLOL TARTRATE 1 MG/ML
1 INJECTION, SOLUTION INTRAVENOUS
Status: DISCONTINUED | OUTPATIENT
Start: 2020-09-03 | End: 2020-09-03 | Stop reason: HOSPADM

## 2020-09-03 RX ORDER — KETOROLAC TROMETHAMINE 30 MG/ML
INJECTION, SOLUTION INTRAMUSCULAR; INTRAVENOUS PRN
Status: DISCONTINUED | OUTPATIENT
Start: 2020-09-03 | End: 2020-09-03 | Stop reason: SURG

## 2020-09-03 RX ORDER — OXYCODONE HCL 5 MG/5 ML
10 SOLUTION, ORAL ORAL
Status: COMPLETED | OUTPATIENT
Start: 2020-09-03 | End: 2020-09-03

## 2020-09-03 RX ADMIN — FENTANYL CITRATE 25 MCG: 50 INJECTION INTRAMUSCULAR; INTRAVENOUS at 15:43

## 2020-09-03 RX ADMIN — SODIUM CHLORIDE, POTASSIUM CHLORIDE, SODIUM LACTATE AND CALCIUM CHLORIDE: 600; 310; 30; 20 INJECTION, SOLUTION INTRAVENOUS at 14:07

## 2020-09-03 RX ADMIN — NEOSTIGMINE METHYLSULFATE 5 MG: 1 INJECTION INTRAVENOUS at 15:53

## 2020-09-03 RX ADMIN — FENTANYL CITRATE 50 MCG: 50 INJECTION INTRAMUSCULAR; INTRAVENOUS at 16:57

## 2020-09-03 RX ADMIN — METOCLOPRAMIDE 10 MG: 5 INJECTION, SOLUTION INTRAMUSCULAR; INTRAVENOUS at 15:45

## 2020-09-03 RX ADMIN — KETOROLAC TROMETHAMINE 30 MG: 30 INJECTION, SOLUTION INTRAMUSCULAR at 15:45

## 2020-09-03 RX ADMIN — GLYCOPYRROLATE 0.6 MG: 0.2 INJECTION INTRAMUSCULAR; INTRAVENOUS at 15:53

## 2020-09-03 RX ADMIN — DEXAMETHASONE SODIUM PHOSPHATE 4 MG: 4 INJECTION, SOLUTION INTRA-ARTICULAR; INTRALESIONAL; INTRAMUSCULAR; INTRAVENOUS; SOFT TISSUE at 15:29

## 2020-09-03 RX ADMIN — OXYCODONE HYDROCHLORIDE 10 MG: 5 SOLUTION ORAL at 16:10

## 2020-09-03 RX ADMIN — PROPOFOL 200 MG: 10 INJECTION, EMULSION INTRAVENOUS at 15:29

## 2020-09-03 RX ADMIN — SODIUM CHLORIDE, POTASSIUM CHLORIDE, SODIUM LACTATE AND CALCIUM CHLORIDE: 600; 310; 30; 20 INJECTION, SOLUTION INTRAVENOUS at 15:20

## 2020-09-03 RX ADMIN — HALOPERIDOL LACTATE 1 MG: 5 INJECTION, SOLUTION INTRAMUSCULAR at 16:37

## 2020-09-03 RX ADMIN — FENTANYL CITRATE 25 MCG: 50 INJECTION INTRAMUSCULAR; INTRAVENOUS at 15:52

## 2020-09-03 RX ADMIN — FENTANYL CITRATE 50 MCG: 50 INJECTION INTRAMUSCULAR; INTRAVENOUS at 17:14

## 2020-09-03 RX ADMIN — LIDOCAINE HYDROCHLORIDE 40 MG: 20 INJECTION, SOLUTION EPIDURAL; INFILTRATION; INTRACAUDAL at 15:29

## 2020-09-03 RX ADMIN — CEFAZOLIN 2 G: 330 INJECTION, POWDER, FOR SOLUTION INTRAMUSCULAR; INTRAVENOUS at 15:29

## 2020-09-03 RX ADMIN — LIDOCAINE HYDROCHLORIDE 0.5 ML: 10 INJECTION, SOLUTION EPIDURAL; INFILTRATION; INTRACAUDAL at 14:08

## 2020-09-03 RX ADMIN — ONDANSETRON 4 MG: 2 INJECTION INTRAMUSCULAR; INTRAVENOUS at 15:45

## 2020-09-03 RX ADMIN — ROCURONIUM BROMIDE 20 MG: 10 INJECTION, SOLUTION INTRAVENOUS at 15:29

## 2020-09-03 RX ADMIN — FENTANYL CITRATE 25 MCG: 50 INJECTION INTRAMUSCULAR; INTRAVENOUS at 15:21

## 2020-09-03 ASSESSMENT — PAIN DESCRIPTION - PAIN TYPE
TYPE: SURGICAL PAIN

## 2020-09-03 ASSESSMENT — FIBROSIS 4 INDEX: FIB4 SCORE: 0.59

## 2020-09-03 NOTE — OR SURGEON
Immediate Post OP Note    PreOp Diagnosis: Extrusion of right breast tissue expander.    PostOp Diagnosis: Extrusion and infection of right breast tissue expander    Procedure(s):  REMOVAL, TISSUE EXPANDER-REMOVAL OF BREAST - Wound Class: Dirty or Infected    Surgeon(s):  Guy Starks M.D.    Anesthesiologist/Type of Anesthesia:  Anesthesiologist: Mich Vital M.D./General    Surgical Staff:  Circulator: Kaylie Hawkins R.N.  Scrub Person: Daisy Stephens    Specimens removed if any:  ID Type Source Tests Collected by Time Destination   1 : Right Breast Wound Other Other AEROBIC/ANAEROBIC CULTURE (SURGERY) Guy Starks M.D. 9/3/2020  3:36 PM        Estimated Blood Loss: 20cc    Findings:     Complications: none        9/3/2020 4:00 PM Guy Starks M.D.

## 2020-09-03 NOTE — ANESTHESIA PREPROCEDURE EVALUATION
Relevant Problems   CARDIAC   (+) Hypertension      GI   (+) GERD (gastroesophageal reflux disease)      Other   (+) Breast cancer, left breast (HCC)   (+) Wound infection after surgery       Physical Exam    Airway   Mallampati: II  TM distance: <3 FB  Neck ROM: full       Cardiovascular - normal exam  Rhythm: regular  Rate: normal  (-) murmur     Dental - normal exam           Pulmonary - normal exam  Breath sounds clear to auscultation     Abdominal    Neurological - normal exam                 Anesthesia Plan    ASA 2       Plan - general       Airway plan will be LMA        Induction: intravenous      Pertinent diagnostic labs and testing reviewed    Informed Consent:    Anesthetic plan and risks discussed with patient and spouse.

## 2020-09-03 NOTE — ANESTHESIA POSTPROCEDURE EVALUATION
Patient: Melanie Leyva    Procedure Summary     Date: 09/03/20 Room / Location: [place] [id number] / SURGERY [facility]    Anesthesia Start: 1520 Anesthesia Stop: 1603    Procedure: REMOVAL, TISSUE EXPANDER-REMOVAL OF BREAST (Right Chest) Diagnosis: (EXTRUSION OF RIGHT BREAST TISSUE EXPANDER)    Surgeon: Guy Starks M.D. Responsible Provider: Mich Vital M.D.    Anesthesia Type: general ASA Status: 2          Final Anesthesia Type: general  Last vitals  BP   Blood Pressure: 115/56    Temp   36.3 °C (97.4 °F)    Pulse   Pulse: 70   Resp   14    SpO2   96 %      Anesthesia Post Evaluation    Patient location during evaluation: PACU  Patient participation: complete - patient participated  Level of consciousness: awake and alert    Airway patency: patent  Anesthetic complications: no  Cardiovascular status: hemodynamically stable  Respiratory status: acceptable  Hydration status: euvolemic    PONV: none           Nurse Pain Score: 5 (NPRS)

## 2020-09-03 NOTE — ANESTHESIA TIME REPORT
Anesthesia Start and Stop Event Times     Date Time Event    9/3/2020 1507 Ready for Procedure     1520 Anesthesia Start     1603 Anesthesia Stop        Responsible Staff  09/03/20    Name Role Begin End    Mich Vital M.D. Anesth 1520 1603        Preop Diagnosis (Free Text):  Pre-op Diagnosis     EXTRUSION OF RIGHT BREAST TISSUE EXPANDER        Preop Diagnosis (Codes):    Post op Diagnosis  Infection      Premium Reason  A. 3PM - 7AM    Comments:

## 2020-09-03 NOTE — ANESTHESIA PROCEDURE NOTES
Airway    Date/Time: 9/3/2020 3:30 PM  Performed by: Mich Vital M.D.  Authorized by: Mich Vital M.D.     Location:  OR  Urgency:  Elective  Difficult Airway: No    Indications for Airway Management:  Anesthesia      Spontaneous Ventilation: absent    Sedation Level:  Deep  Preoxygenated: Yes    Patient Position:  Sniffing  Final Airway Type:  Endotracheal airway  Final Endotracheal Airway:  ETT  Cuffed: Yes    Technique Used for Successful ETT Placement:  Direct laryngoscopy  Devices/Methods Used in Placement:  Intubating stylet    Insertion Site:  Oral  Blade Type:  Mg  Laryngoscope Blade/Videolaryngoscope Blade Size:  3  ETT Size (mm):  6.0  Measured from:  Teeth  ETT to Teeth (cm):  21  Placement Verified by: auscultation and capnometry    Cormack-Lehane Classification:  Grade IIa - partial view of glottis  Number of Attempts at Approach:  1

## 2020-09-04 LAB
GRAM STN SPEC: NORMAL
SIGNIFICANT IND 70042: NORMAL
SITE SITE: NORMAL
SOURCE SOURCE: NORMAL

## 2020-09-04 NOTE — OR NURSING
16:00  Patient arrived from the OR to PACU 10B.  Bedside report received.  VSS.  Will continue to monitor for bleeding/pain/nausea.      1700  Updated pt spouse on pt status.

## 2020-09-04 NOTE — OR NURSING
Arrived to Phase II after report from Junie SHAH, denies nausea, reports pain 2/10. Tolerable per pt.  Ambulated from gurney to chair with standby assist.    Void X1 on arrival to discharge area.    Surgical site dressing clean and dry. ERON drain in place.     Alarms on and set to appropriate parameters. Call light within reach, rounding in place. Belongings given to pt. Pt.  at bedside.

## 2020-09-04 NOTE — DISCHARGE INSTRUCTIONS
ACTIVITY: Rest and take it easy for the first 24 hours.  A responsible adult is recommended to remain with you during that time.  It is normal to feel sleepy.  We encourage you to not do anything that requires balance, judgment or coordination.    MILD FLU-LIKE SYMPTOMS ARE NORMAL. YOU MAY EXPERIENCE GENERALIZED MUSCLE ACHES, THROAT IRRITATION, HEADACHE AND/OR SOME NAUSEA.    FOR 24 HOURS DO NOT:  Drive, operate machinery or run household appliances.  Drink beer or alcoholic beverages.   Make important decisions or sign legal documents.    SPECIAL INSTRUCTIONS: Follow any instructions given by Dr. Starks     DIET: To avoid nausea, slowly advance diet as tolerated, avoiding spicy or greasy foods for the first day.  Add more substantial food to your diet according to your physician's instructions.INCREASE FLUIDS AND FIBER TO AVOID CONSTIPATION.    SURGICAL DRESSING/BATHING: Do not remove dressing until seen for follow up.    FOLLOW-UP APPOINTMENT:  A follow-up appointment should be arranged with your doctor in 1-2 weeks or as instructed; call to schedule.    You should CALL YOUR PHYSICIAN if you develop:  Fever greater than 101 degrees F.  Pain not relieved by medication, or persistent nausea or vomiting.  Excessive bleeding (blood soaking through dressing) or unexpected drainage from the wound.  Extreme redness or swelling around the incision site, drainage of pus or foul smelling drainage.  Inability to urinate or empty your bladder within 8 hours.  Problems with breathing or chest pain.    You should call 911 if you develop problems with breathing or chest pain.  If you are unable to contact your doctor or surgical center, you should go to the nearest emergency room or urgent care center.    Physician's telephone #: [phone] Dr Starks    If any questions arise, call your doctor.  If your doctor is not available, please feel free to call the Surgical Center at [phone].  The Center is open Monday  through Friday from 7AM to 7PM.  You can also call the HEALTH HOTLINE open 24 hours/day, 7 days/week and speak to a nurse at [phone], or toll free at [phone].    A registered nurse may call you a few days after your surgery to see how you are doing after your procedure.    MEDICATIONS: Resume taking daily medication.  Take prescribed pain medication with food.  If no medication is prescribed, you may take non-aspirin pain medication if needed.  PAIN MEDICATION CAN BE VERY CONSTIPATING.  Take a stool softener or laxative such as senokot, pericolace, or milk of magnesia if needed.    No prescription medications given. May take Tylenol or Ibuprofen for pain. Call Dr. Starks if pain is not controlled with Tylenol/Ibuprofen. Last pain medication given at oxycodone 10mg at 4:10pm.    If your physician has prescribed pain medication that includes Acetaminophen (Tylenol), do not take additional Acetaminophen (Tylenol) while taking the prescribed medication.      Depression / Suicide Risk    As you are discharged from this [facility] facility, it is important to learn how to keep safe from harming yourself.    Recognize the warning signs:  · Abrupt changes in personality, positive or negative- including increase in energy   · Giving away possessions  · Change in eating patterns- significant weight changes-  positive or negative  · Change in sleeping patterns- unable to sleep or sleeping all the time   · Unwillingness or inability to communicate  · Depression  · Unusual sadness, discouragement and loneliness  · Talk of wanting to die  · Neglect of personal appearance   · Rebelliousness- reckless behavior  · Withdrawal from people/activities they love  · Confusion- inability to concentrate     If you or a loved one observes any of these behaviors or has concerns about self-harm, here's what you can do:  · Talk about it- your feelings and reasons for harming yourself  · Remove any means that you might use to  hurt yourself (examples: pills, rope, extension cords, firearm)  · Get professional help from the community (Mental Health, Substance Abuse, psychological counseling)  · Do not be alone:Call your Safe Contact- someone whom you trust who will be there for you.  · Call your local CRISIS HOTLINE 609-3687 or 726-550-5575  · Call your local Children's Mobile Crisis Response Team [place] (874) 777-2727 or www.[employer]  · Call the toll free National Suicide Prevention Hotlines   · National Suicide Prevention Lifeline 743-171-BUMF (9727)  · National Hope Line Network 800-SUICIDE (375-5207)          Surgical Drain Record  Empty your surgical drain as told by your health care provider. Use this form to write down the amount of fluid that has collected in the drainage container. Bring this form with you to your follow-up visits.  Surgical drain #1 location: ___________________    Date __________ Time __________ Amount __________  Date __________ Time __________ Amount __________  Date __________ Time __________ Amount __________  Date __________ Time __________ Amount __________  Date __________ Time __________ Amount __________  Date __________ Time __________ Amount __________  Date __________ Time __________ Amount __________  Date __________ Time __________ Amount __________  Date __________ Time __________ Amount __________  Date __________ Time __________ Amount __________  Date __________ Time __________ Amount __________  Date __________ Time __________ Amount __________  Date __________ Time __________ Amount __________  Date __________ Time __________ Amount __________  Date __________ Time __________ Amount __________  Date __________ Time __________ Amount __________  Date __________ Time __________ Amount __________  Date __________ Time __________ Amount __________  Date __________ Time __________ Amount __________  Date __________ Time __________ Amount __________  Date __________ Time __________  Amount __________  Surgical drain #2 location: ___________________  Date __________ Time __________ Amount __________  Date __________ Time __________ Amount __________  Date __________ Time __________ Amount __________  Date __________ Time __________ Amount __________  Date __________ Time __________ Amount __________  Date __________ Time __________ Amount __________  Date __________ Time __________ Amount __________  Date __________ Time __________ Amount __________  Date __________ Time __________ Amount __________  Date __________ Time __________ Amount __________  Date __________ Time __________ Amount __________  Date __________ Time __________ Amount __________  Date __________ Time __________ Amount __________  Date __________ Time __________ Amount __________  Date __________ Time __________ Amount __________  Date __________ Time __________ Amount __________  Date __________ Time __________ Amount __________  Date __________ Time __________ Amount __________  Date __________ Time __________ Amount __________  Date __________ Time __________ Amount __________  Date __________ Time __________ Amount __________  This information is not intended to replace advice given to you by your health care provider. Make sure you discuss any questions you have with your health care provider.  Document Released: 09/24/2018 Document Revised: 09/24/2018 Document Reviewed: 09/24/2018  Elsevier Patient Education © 2020 [employer]

## 2020-09-04 NOTE — OR NURSING
Pt's VSS; denies N/V; states pain is at tolerable level. Dressing CDI to chest D/c orders received. IV removed Pt changed into clothing with assistance. Pt up and ambulated to BR, steady gait, voided adequately. Discharge instructions given as well as pain management handout; demonstrated to pt. And  how to empty and care for ERON drain. Pt. States she has had this drain in the past and is familiar with it.  Pt. and family verbalized understanding and questions answered. Patient states ready to d/c home. No prescriptions given. Pt dc'd in w/c with RN in stable condition.

## 2020-09-05 LAB
BACTERIA WND AEROBE CULT: ABNORMAL
BACTERIA WND AEROBE CULT: ABNORMAL
GRAM STN SPEC: ABNORMAL
SIGNIFICANT IND 70042: ABNORMAL
SITE SITE: ABNORMAL
SOURCE SOURCE: ABNORMAL

## 2020-09-07 LAB
BACTERIA SPEC ANAEROBE CULT: NORMAL
SIGNIFICANT IND 70042: NORMAL
SITE SITE: NORMAL
SOURCE SOURCE: NORMAL

## 2020-09-11 NOTE — OP REPORT
DATE OF SERVICE:  09/03/2020    PREOPERATIVE DIAGNOSIS:  Exposure of right breast tissue expander.    POSTOPERATIVE DIAGNOSES:  1.  Exposure of right breast tissue expander.  2.  Infection of right breast tissue expander.    PROCEDURE PERFORMED:  Removal of right breast tissue expander.    SURGEON:  Guy Starks MD    ASSISTANT:  None.    ANESTHESIA TYPE:  General.    SPECIMENS:  Cultures of right breast fluid.    DRAINS:  A 10-Cypriot ERON drain.    ESTIMATED BLOOD LOSS:  10 mL.    COMPLICATIONS:  None.    BRIEF HISTORY:  This is a 60-year-old female who was diagnosed with left   breast cancer last year.  She ultimately underwent bilateral total   mastectomies without reconstruction.  Patient does have a history of   immunodeficiency.  Unfortunately, she wound up with bilateral wound healing   issues requiring wound VAC and closure by secondary intention.  After healing   from this, she then desired reconstruction and saw me in consultation.  We   discussed performing tissue expander reconstruction, with the understanding   that given her history, there would be an increased risk of potential   complications such as infection, wound healing issues and possible failure of   the reconstruction.  She underwent delayed bilateral breast reconstruction   with tissue expanders and AlloDerm placement on 07/20/2020.  She had been   doing well in the postop period, but approximately 1 week ago, noted some   leakage from the right lateral incision line.  This was serous in nature and   eventually resolved.  She did not have any fevers or chills, but she was   started on Augmentin.  This initially seemed to improve, but she ended up   having more swelling in the area.  She saw me for followup yesterday with a   return of drainage and on exam, there was a 1 cm opening at the lateral skin   incision with the tissue expander visible underneath.  For this reason, we   discussed removing the tissue expander and allowing the  wound to heal before   discussing any further reconstruction.  Risks, benefits and alternatives of   the procedure were explained to her and all of her questions answered.    PROCEDURE IN DETAIL:  After informed consent was obtained, the patient was   marked in the preoperative holding area.  She was then brought to the   operating room and placed on the table in the supine position.  After   induction of general anesthesia, her right chest was prepped and draped in the   usual sterile fashion.  A timeout was called correctly identifying the   patient and procedure.  I began by excising the open area of the right breast   lateral incision after anesthetizing the area with 0.5% Marcaine.  The   underlying AlloDerm had not fully taken in this area, and this was excised   with scissors.  The tissue expander was easily removed.  Of note, there was a   small amount of thin purulent fluid, which was cultured.  I then examined the   remainder of the cavity for any loose pieces of AlloDerm or other   abnormalities.  The entire area was then copiously irrigated with sterile   saline until the effluent was clear.  A 10-Serbian drain was then placed   through a separate lateral stab incision and the wound closed with 3-0   Monocryl deep dermal and 4-0 Monocryl subcuticular sutures.  She was dressed   with Steri-Strips and a bulky gauze dressing.  She tolerated the procedure   well and there were no complications.  The sponge and instrument count were   correct at the end of the case.       ____________________________________     OMER HOYT MD PSM / LIOR    DD:  09/11/2020 10:42:54  DT:  09/11/2020 13:57:22    D#:  [id number]  Job#:  [id number]

## 2020-10-16 ENCOUNTER — PRE-ADMISSION TESTING (OUTPATIENT)
Dept: ADMISSIONS | Facility: MEDICAL CENTER | Age: 60
End: 2020-10-16
Attending: INTERNAL MEDICINE
Payer: COMMERCIAL

## 2020-10-16 VITALS — HEIGHT: 63 IN | BODY MASS INDEX: 37.61 KG/M2

## 2020-10-16 RX ORDER — SPIRONOLACTONE 25 MG/1
25 TABLET ORAL
COMMUNITY
Start: 2020-09-14 | End: 2021-07-08

## 2020-10-16 RX ORDER — CHOLECALCIFEROL (VITAMIN D3) 1250 MCG
1 CAPSULE ORAL
COMMUNITY
Start: 2020-09-19

## 2020-10-16 SDOH — HEALTH STABILITY: MENTAL HEALTH: HOW OFTEN DO YOU HAVE A DRINK CONTAINING ALCOHOL?: MONTHLY OR LESS

## 2020-10-16 SDOH — HEALTH STABILITY: MENTAL HEALTH: HOW OFTEN DO YOU HAVE 6 OR MORE DRINKS ON ONE OCCASION?: NEVER

## 2020-10-16 NOTE — OR NURSING
Preadmit instructions given and emailed. Anesthesia fasting instructions given and emailed. Any patient questions addressed. Pt instructed to take these medications on day of surgery: Diltiazem and Prilosec. Appt made for covid and MANNY on 10/21

## 2020-10-21 ENCOUNTER — PRE-ADMISSION TESTING (OUTPATIENT)
Dept: ADMISSIONS | Facility: MEDICAL CENTER | Age: 60
End: 2020-10-21
Attending: SURGERY
Payer: COMMERCIAL

## 2020-10-21 DIAGNOSIS — Z01.812 PRE-OPERATIVE LABORATORY EXAMINATION: ICD-10-CM

## 2020-10-21 LAB
ANION GAP SERPL CALC-SCNC: 12 MMOL/L (ref 7–16)
BUN SERPL-MCNC: 18 MG/DL (ref 8–22)
CALCIUM SERPL-MCNC: 9.3 MG/DL (ref 8.4–10.2)
CHLORIDE SERPL-SCNC: 105 MMOL/L (ref 96–112)
CO2 SERPL-SCNC: 24 MMOL/L (ref 20–33)
COVID ORDER STATUS COVID19: NORMAL
CREAT SERPL-MCNC: 0.86 MG/DL (ref 0.5–1.4)
GLUCOSE SERPL-MCNC: 82 MG/DL (ref 65–99)
POTASSIUM SERPL-SCNC: 3.9 MMOL/L (ref 3.6–5.5)
SARS-COV-2 RNA RESP QL NAA+PROBE: NOTDETECTED
SODIUM SERPL-SCNC: 141 MMOL/L (ref 135–145)
SPECIMEN SOURCE: NORMAL

## 2020-10-21 PROCEDURE — 80048 BASIC METABOLIC PNL TOTAL CA: CPT

## 2020-10-21 NOTE — OR NURSING
"1527: COVID results not done yet, but pt was called and asked COVID screening questions. Answered \"No\" to all questions. Pt reminded to bring a mask and that only one visitor is allowed.  "

## 2020-10-22 ENCOUNTER — ANESTHESIA EVENT (OUTPATIENT)
Dept: SURGERY | Facility: MEDICAL CENTER | Age: 60
End: 2020-10-22
Payer: COMMERCIAL

## 2020-10-22 ENCOUNTER — ANESTHESIA (OUTPATIENT)
Dept: SURGERY | Facility: MEDICAL CENTER | Age: 60
End: 2020-10-22
Payer: COMMERCIAL

## 2020-10-22 ENCOUNTER — HOSPITAL ENCOUNTER (OUTPATIENT)
Facility: MEDICAL CENTER | Age: 60
End: 2020-10-22
Attending: SURGERY | Admitting: SURGERY
Payer: COMMERCIAL

## 2020-10-22 VITALS
HEIGHT: 63 IN | DIASTOLIC BLOOD PRESSURE: 61 MMHG | OXYGEN SATURATION: 94 % | HEART RATE: 67 BPM | SYSTOLIC BLOOD PRESSURE: 125 MMHG | TEMPERATURE: 97.5 F | WEIGHT: 211.64 LBS | RESPIRATION RATE: 14 BRPM | BODY MASS INDEX: 37.5 KG/M2

## 2020-10-22 DIAGNOSIS — G89.18 POSTOPERATIVE PAIN: ICD-10-CM

## 2020-10-22 PROBLEM — E11.9 DIABETES (HCC): Status: ACTIVE | Noted: 2020-10-22

## 2020-10-22 PROBLEM — E78.00 HIGH CHOLESTEROL: Status: ACTIVE | Noted: 2020-10-22

## 2020-10-22 PROBLEM — E66.9 OBESITY (BMI 35.0-39.9 WITHOUT COMORBIDITY): Status: ACTIVE | Noted: 2020-10-22

## 2020-10-22 PROBLEM — G47.30 SLEEP APNEA: Status: ACTIVE | Noted: 2020-10-22

## 2020-10-22 PROBLEM — Z85.3 HISTORY OF BREAST CANCER: Status: ACTIVE | Noted: 2020-10-22

## 2020-10-22 PROCEDURE — 160048 HCHG OR STATISTICAL LEVEL 1-5: Performed by: SURGERY

## 2020-10-22 PROCEDURE — 501835 HCHG SUTURE PLASTIC: Performed by: SURGERY

## 2020-10-22 PROCEDURE — 700101 HCHG RX REV CODE 250: Performed by: SURGERY

## 2020-10-22 PROCEDURE — 160028 HCHG SURGERY MINUTES - 1ST 30 MINS LEVEL 3: Performed by: SURGERY

## 2020-10-22 PROCEDURE — 160035 HCHG PACU - 1ST 60 MINS PHASE I: Performed by: SURGERY

## 2020-10-22 PROCEDURE — 160025 RECOVERY II MINUTES (STATS): Performed by: SURGERY

## 2020-10-22 PROCEDURE — 160039 HCHG SURGERY MINUTES - EA ADDL 1 MIN LEVEL 3: Performed by: SURGERY

## 2020-10-22 PROCEDURE — 700101 HCHG RX REV CODE 250: Performed by: ANESTHESIOLOGY

## 2020-10-22 PROCEDURE — 160002 HCHG RECOVERY MINUTES (STAT): Performed by: SURGERY

## 2020-10-22 PROCEDURE — 160009 HCHG ANES TIME/MIN: Performed by: SURGERY

## 2020-10-22 PROCEDURE — 700105 HCHG RX REV CODE 258: Performed by: SURGERY

## 2020-10-22 PROCEDURE — 160046 HCHG PACU - 1ST 60 MINS PHASE II: Performed by: SURGERY

## 2020-10-22 PROCEDURE — 700111 HCHG RX REV CODE 636 W/ 250 OVERRIDE (IP): Performed by: ANESTHESIOLOGY

## 2020-10-22 RX ORDER — BUPIVACAINE HYDROCHLORIDE AND EPINEPHRINE 5; 5 MG/ML; UG/ML
INJECTION, SOLUTION EPIDURAL; INTRACAUDAL; PERINEURAL
Status: DISCONTINUED | OUTPATIENT
Start: 2020-10-22 | End: 2020-10-22 | Stop reason: HOSPADM

## 2020-10-22 RX ORDER — ONDANSETRON 2 MG/ML
4 INJECTION INTRAMUSCULAR; INTRAVENOUS
Status: DISCONTINUED | OUTPATIENT
Start: 2020-10-22 | End: 2020-10-22 | Stop reason: HOSPADM

## 2020-10-22 RX ORDER — METOPROLOL TARTRATE 1 MG/ML
1 INJECTION, SOLUTION INTRAVENOUS
Status: DISCONTINUED | OUTPATIENT
Start: 2020-10-22 | End: 2020-10-22 | Stop reason: HOSPADM

## 2020-10-22 RX ORDER — HYDROCODONE BITARTRATE AND ACETAMINOPHEN 5; 325 MG/1; MG/1
1 TABLET ORAL EVERY 4 HOURS PRN
Qty: 18 TAB | Refills: 0 | Status: SHIPPED | OUTPATIENT
Start: 2020-10-22 | End: 2020-10-25

## 2020-10-22 RX ORDER — ONDANSETRON 2 MG/ML
INJECTION INTRAMUSCULAR; INTRAVENOUS PRN
Status: DISCONTINUED | OUTPATIENT
Start: 2020-10-22 | End: 2020-10-22 | Stop reason: SURG

## 2020-10-22 RX ORDER — LIDOCAINE HYDROCHLORIDE 20 MG/ML
INJECTION, SOLUTION EPIDURAL; INFILTRATION; INTRACAUDAL; PERINEURAL PRN
Status: DISCONTINUED | OUTPATIENT
Start: 2020-10-22 | End: 2020-10-22 | Stop reason: SURG

## 2020-10-22 RX ORDER — MIDAZOLAM HYDROCHLORIDE 1 MG/ML
INJECTION INTRAMUSCULAR; INTRAVENOUS PRN
Status: DISCONTINUED | OUTPATIENT
Start: 2020-10-22 | End: 2020-10-22 | Stop reason: SURG

## 2020-10-22 RX ORDER — SODIUM CHLORIDE, SODIUM LACTATE, POTASSIUM CHLORIDE, CALCIUM CHLORIDE 600; 310; 30; 20 MG/100ML; MG/100ML; MG/100ML; MG/100ML
INJECTION, SOLUTION INTRAVENOUS CONTINUOUS
Status: DISCONTINUED | OUTPATIENT
Start: 2020-10-22 | End: 2020-10-22 | Stop reason: HOSPADM

## 2020-10-22 RX ORDER — HALOPERIDOL 5 MG/ML
1 INJECTION INTRAMUSCULAR
Status: DISCONTINUED | OUTPATIENT
Start: 2020-10-22 | End: 2020-10-22 | Stop reason: HOSPADM

## 2020-10-22 RX ORDER — CEFAZOLIN SODIUM 1 G/3ML
INJECTION, POWDER, FOR SOLUTION INTRAMUSCULAR; INTRAVENOUS PRN
Status: DISCONTINUED | OUTPATIENT
Start: 2020-10-22 | End: 2020-10-22 | Stop reason: SURG

## 2020-10-22 RX ORDER — LABETALOL HYDROCHLORIDE 5 MG/ML
5 INJECTION, SOLUTION INTRAVENOUS
Status: DISCONTINUED | OUTPATIENT
Start: 2020-10-22 | End: 2020-10-22 | Stop reason: HOSPADM

## 2020-10-22 RX ORDER — HYDRALAZINE HYDROCHLORIDE 20 MG/ML
5 INJECTION INTRAMUSCULAR; INTRAVENOUS
Status: DISCONTINUED | OUTPATIENT
Start: 2020-10-22 | End: 2020-10-22 | Stop reason: HOSPADM

## 2020-10-22 RX ADMIN — LIDOCAINE HYDROCHLORIDE 0.5 ML: 10 INJECTION, SOLUTION INFILTRATION; PERINEURAL at 06:42

## 2020-10-22 RX ADMIN — SODIUM CHLORIDE, POTASSIUM CHLORIDE, SODIUM LACTATE AND CALCIUM CHLORIDE: 600; 310; 30; 20 INJECTION, SOLUTION INTRAVENOUS at 06:48

## 2020-10-22 RX ADMIN — ALFENTANIL HYDROCHLORIDE 5 MCG: 500 INJECTION, SOLUTION INTRAVENOUS at 07:30

## 2020-10-22 RX ADMIN — CEFAZOLIN 2 G: 1 INJECTION, POWDER, FOR SOLUTION INTRAVENOUS at 07:39

## 2020-10-22 RX ADMIN — FENTANYL CITRATE 50 MCG: 50 INJECTION, SOLUTION INTRAMUSCULAR; INTRAVENOUS at 07:30

## 2020-10-22 RX ADMIN — PROPOFOL 150 MG: 10 INJECTION, EMULSION INTRAVENOUS at 07:36

## 2020-10-22 RX ADMIN — FENTANYL CITRATE 25 MCG: 50 INJECTION, SOLUTION INTRAMUSCULAR; INTRAVENOUS at 07:50

## 2020-10-22 RX ADMIN — ONDANSETRON 4 MG: 2 INJECTION INTRAMUSCULAR; INTRAVENOUS at 07:55

## 2020-10-22 RX ADMIN — MIDAZOLAM HYDROCHLORIDE 1 MG: 1 INJECTION, SOLUTION INTRAMUSCULAR; INTRAVENOUS at 07:29

## 2020-10-22 RX ADMIN — LIDOCAINE HYDROCHLORIDE 100 MG: 20 INJECTION, SOLUTION EPIDURAL; INFILTRATION; INTRACAUDAL; PERINEURAL at 07:35

## 2020-10-22 RX ADMIN — PROPOFOL 30 MG: 10 INJECTION, EMULSION INTRAVENOUS at 07:59

## 2020-10-22 ASSESSMENT — PAIN SCALES - GENERAL: PAIN_LEVEL: 0

## 2020-10-22 ASSESSMENT — FIBROSIS 4 INDEX: FIB4 SCORE: 0.59

## 2020-10-22 NOTE — ANESTHESIA PROCEDURE NOTES
Airway    Date/Time: 10/22/2020 7:37 AM  Performed by: Keith Milner M.D.  Authorized by: Keith Milner M.D.     Location:  OR  Urgency:  Elective  Difficult Airway: No    Indications for Airway Management:  Anesthesia      Spontaneous Ventilation: absent    Sedation Level:  Deep  Preoxygenated: Yes    Mask Difficulty Assessment:  0 - not attempted  Final Airway Type:  Supraglottic airway  Final Supraglottic Airway:  Standard LMA    SGA Size:  4  Number of Attempts at Approach:  1  Ventilation Between Attempts:  None  Number of Other Approaches Attempted:  0

## 2020-10-22 NOTE — OR SURGEON
Immediate Post OP Note    PreOp Diagnosis: Complications of breast tissue expander    PostOp Diagnosis: Same    Procedure(s):  REMOVAL, TISSUE EXPANDER - REMOVAL BREAST - Wound Class: Clean    Surgeon(s):  Guy Starks M.D.    Anesthesiologist/Type of Anesthesia:  Anesthesiologist: Keith Milner M.D./General    Surgical Staff:  Circulator: Chao Kearns R.N.  Scrub Person: Kulwant Carbajal    Specimens removed if any:  * No specimens in log *    Estimated Blood Loss: 5cc    Findings:     Complications: none        10/22/2020 8:08 AM Guy Starks M.D.

## 2020-10-22 NOTE — ANESTHESIA POSTPROCEDURE EVALUATION
Patient: Melanie Leyva    Procedure Summary     Date: 10/22/20 Room / Location: [place] [address] / SURGERY [facility]    Anesthesia Start: 0729 Anesthesia Stop:     Procedure: REMOVAL, TISSUE EXPANDER - REMOVAL BREAST (Left Breast) Diagnosis: (COMPLICATIONS FROM BREAST RECONSTRUCTION)    Surgeon: Guy Starks M.D. Responsible Provider: Keith Milner M.D.    Anesthesia Type: general ASA Status: 3          Final Anesthesia Type: general  Last vitals  BP   Blood Pressure: 112/68    Temp   36 °C (96.8 °F)    Pulse   Pulse: 68   Resp   12    SpO2   94 %      Anesthesia Post Evaluation    Patient location during evaluation: PACU  Patient participation: complete - patient participated  Level of consciousness: awake and alert  Pain score: 0    Airway patency: patent  Anesthetic complications: no  Cardiovascular status: hemodynamically stable  Respiratory status: acceptable  Hydration status: euvolemic    PONV: none           Nurse Pain Score: 0 (NPRS)

## 2020-10-22 NOTE — ANESTHESIA PREPROCEDURE EVALUATION
Relevant Problems   ANESTHESIA   (+) Sleep apnea      NEURO   (+) History of breast cancer      CARDIAC   (+) Hypertension      GI   (+) GERD (gastroesophageal reflux disease)      Other   (+) Breast cancer, left breast (HCC)   (+) Diabetes (HCC)   (+) High cholesterol       Physical Exam    Airway   Mallampati: II  TM distance: >3 FB  Neck ROM: full       Cardiovascular - normal exam  Rhythm: regular  Rate: normal  (-) murmur     Dental - normal exam           Pulmonary - normal exam  Breath sounds clear to auscultation     Abdominal    Neurological - normal exam                 Anesthesia Plan    ASA 3 (MICKY. HTN. Breast cancer. Obesity.)   ASA physical status 3 criteria: other (comment)    Plan - general       Airway plan will be LMA        Induction: intravenous    Postoperative Plan: Postoperative administration of opioids is intended.    Pertinent diagnostic labs and testing reviewed    Informed Consent:    Anesthetic plan and risks discussed with patient.    Use of blood products discussed with: patient whom consented to blood products.

## 2020-10-22 NOTE — DISCHARGE INSTRUCTIONS
ACTIVITY: Rest and take it easy for the first 24 hours.  A responsible adult is recommended to remain with you during that time.  It is normal to feel sleepy.  We encourage you to not do anything that requires balance, judgment or coordination.    MILD FLU-LIKE SYMPTOMS ARE NORMAL. YOU MAY EXPERIENCE GENERALIZED MUSCLE ACHES, THROAT IRRITATION, HEADACHE AND/OR SOME NAUSEA.    FOR 24 HOURS DO NOT:  Drive, operate machinery or run household appliances.  Drink beer or alcoholic beverages.   Make important decisions or sign legal documents.        DIET: To avoid nausea, slowly advance diet as tolerated, avoiding spicy or greasy foods for the first day.  Add more substantial food to your diet according to your physician's instructions.  Babies can be fed formula or breast milk as soon as they are hungry.  INCREASE FLUIDS AND FIBER TO AVOID CONSTIPATION.    SURGICAL DRESSING/BATHING: Keep dressing dry and intact.  Do not remove until told by doctor.    FOLLOW-UP APPOINTMENT:  A follow-up appointment should be arranged with your doctor. call to schedule.    You should CALL YOUR PHYSICIAN if you develop:  Fever greater than 101 degrees F.  Pain not relieved by medication, or persistent nausea or vomiting.  Excessive bleeding (blood soaking through dressing) or unexpected drainage from the wound.  Extreme redness or swelling around the incision site, drainage of pus or foul smelling drainage.  Inability to urinate or empty your bladder within 8 hours.  Problems with breathing or chest pain.    You should call 911 if you develop problems with breathing or chest pain.  If you are unable to contact your doctor or surgical center, you should go to the nearest emergency room or urgent care center.  Physician's telephone #: [phone]    If any questions arise, call your doctor.  If your doctor is not available, please feel free to call the Surgical Center at [phone]. The Contact Center is open Monday through Friday 7AM  to 5PM and may speak to a nurse at [phone], or toll free at [phone].     A registered nurse may call you a few days after your surgery to see how you are doing after your procedure.    MEDICATIONS: Resume taking daily medication.  Take prescribed pain medication with food.  If no medication is prescribed, you may take non-aspirin pain medication if needed.  PAIN MEDICATION CAN BE VERY CONSTIPATING.  Take a stool softener or laxative such as senokot, pericolace, or milk of magnesia if needed.    Prescription given for Norco.  Last pain medication given at NA  If your physician has prescribed pain medication that includes Acetaminophen (Tylenol), do not take additional Acetaminophen (Tylenol) while taking the prescribed medication.    Depression / Suicide Risk    As you are discharged from this [facility] facility, it is important to learn how to keep safe from harming yourself.    Recognize the warning signs:  · Abrupt changes in personality, positive or negative- including increase in energy   · Giving away possessions  · Change in eating patterns- significant weight changes-  positive or negative  · Change in sleeping patterns- unable to sleep or sleeping all the time   · Unwillingness or inability to communicate  · Depression  · Unusual sadness, discouragement and loneliness  · Talk of wanting to die  · Neglect of personal appearance   · Rebelliousness- reckless behavior  · Withdrawal from people/activities they love  · Confusion- inability to concentrate     If you or a loved one observes any of these behaviors or has concerns about self-harm, here's what you can do:  · Talk about it- your feelings and reasons for harming yourself  · Remove any means that you might use to hurt yourself (examples: pills, rope, extension cords, firearm)  · Get professional help from the community (Mental Health, Substance Abuse, psychological counseling)  · Do not be alone:Call your Safe Contact- someone whom you  trust who will be there for you.  · Call your local CRISIS HOTLINE 273-2237 or 039-430-5619  · Call your local Children's Mobile Crisis Response Team [place] (266) 559-9158 or www.[employer]  · Call the toll free National Suicide Prevention Hotlines   · National Suicide Prevention Lifeline 927-635-VAKX (3007)  · National [employer] Line Network 800-SUICIDE (421-9337)

## 2020-10-22 NOTE — ANESTHESIA TIME REPORT
Anesthesia Start and Stop Event Times     Date Time Event    10/22/2020 0727 Ready for Procedure     0729 Anesthesia Start     0814 Anesthesia Stop        Responsible Staff  10/22/20    Name Role Begin End    Keith Milner M.D. Anesth 0729 0814        Preop Diagnosis (Free Text):  Pre-op Diagnosis     COMPLICATIONS FROM BREAST RECONSTRUCTION        Preop Diagnosis (Codes):    Post op Diagnosis  History of breast reconstruction  complications from breast reconstruction    Premium Reason  Non-Premium    Comments:

## 2020-10-23 NOTE — OP REPORT
DATE OF SERVICE:  10/22/2020    PREOPERATIVE DIAGNOSIS:   Complications of bilateral breast reconstruction.    POSTOPERATIVE DIAGNOSIS:  Complications of bilateral breast reconstruction.    PROCEDURE PERFORMED:  Removal of left breast tissue expander.    SURGEON:  Guy Starks MD    ASSISTANT:  None.    ANESTHESIOLOGIST:  Keith Milner MD    ANESTHESIA TYPE:  General.    SPECIMENS:  None.    DRAINS:  A 10-Gambian ERON drain.    COMPLICATIONS:  None.    BRIEF HISTORY:  This is a 60-year-old female who was diagnosed with left   breast cancer last year.  She underwent bilateral total mastectomies, but   unfortunately developed wound healing issues on both of the mastectomy sites.    She has a history of an immunodeficiency disorder, which has likely   contributed to the wound healing issues.  She eventually healed and earlier   this year, saw me in consultation for a delayed reconstruction.  I performed a   bilateral breast reconstruction with tissue expanders in July of this year.    She had been doing well until her right incision line opened up with the   expander visible underneath.  For this reason, I removed the expander, but did   not replace it as there were signs of infection.  The patient has   subsequently decided that she no longer wishes to pursue breast reconstruction   and desired to have her left tissue expander removed.  Risks, benefits and   alternatives of the procedure were explained to her and all of her questions   answered.    PROCEDURE IN DETAIL:  After informed consent was obtained, the patient was   marked in the preoperative holding area and then taken to the operating room   and placed on the table in the supine position.  After induction of general   anesthesia, her chest was prepped and draped in the usual sterile fashion.  A   timeout was called correctly identifying the patient and procedure.  She   received 2 grams of Ancef IV preoperatively.  I began by anesthetizing the    lateral aspect of her left transverse mastectomy scar.  The scar was excised   with a scalpel and I entered through the underlying AlloDerm.  The tissue   expander was readily visible and easily removed.  There were no signs of   infection.  Visual inspection of the pocket revealed no abnormalities.  The   AlloDerm was well incorporated into the overlying skin, so this was left in   place.  A 10-Azeri drain was placed through a separate lateral stab incision.    The area was irrigated with sterile saline and the skin closed with 3-0   Monocryl deep dermal and 4-0 Monocryl subcuticular sutures.  The incision line   was dressed with a Steri-Strip, ABD pads and tape.  The patient tolerated the   procedure well and there were no complications.  Sponge, instrument, and   needle count were correct at the end of the case.       ____________________________________     OMER HOYT MD PSM / LIOR    DD:  10/22/2020 20:25:52  DT:  10/22/2020 21:03:00    D#:  [id number]  Job#:  [id number]

## 2021-07-08 ENCOUNTER — TELEMEDICINE (OUTPATIENT)
Dept: ADMISSIONS | Facility: MEDICAL CENTER | Age: 61
End: 2021-07-08
Attending: SURGERY
Payer: COMMERCIAL

## 2021-07-08 RX ORDER — OMEPRAZOLE 20 MG/1
20 CAPSULE, DELAYED RELEASE ORAL
COMMUNITY
Start: 2021-06-18

## 2021-07-08 RX ORDER — DILTIAZEM HYDROCHLORIDE 240 MG/1
CAPSULE, EXTENDED RELEASE ORAL
COMMUNITY
Start: 2021-07-07

## 2021-07-08 RX ORDER — FUROSEMIDE 40 MG/1
40 TABLET ORAL
COMMUNITY
Start: 2021-04-18

## 2021-07-08 RX ORDER — METFORMIN HYDROCHLORIDE 500 MG/1
TABLET, EXTENDED RELEASE ORAL
COMMUNITY
Start: 2021-05-14

## 2021-07-08 RX ORDER — LANOLIN ALCOHOL/MO/W.PET/CERES
325 CREAM (GRAM) TOPICAL
COMMUNITY
Start: 2021-05-18

## 2021-07-08 RX ORDER — SUMATRIPTAN 25 MG/1
TABLET, FILM COATED ORAL
COMMUNITY
Start: 2021-06-17

## 2021-07-15 ENCOUNTER — PRE-ADMISSION TESTING (OUTPATIENT)
Dept: ADMISSIONS | Facility: MEDICAL CENTER | Age: 61
End: 2021-07-15
Attending: SURGERY
Payer: COMMERCIAL

## 2021-07-15 DIAGNOSIS — Z01.812 PRE-OPERATIVE LABORATORY EXAMINATION: ICD-10-CM

## 2021-07-15 LAB
ANION GAP SERPL CALC-SCNC: 14 MMOL/L (ref 7–16)
BUN SERPL-MCNC: 18 MG/DL (ref 8–22)
CALCIUM SERPL-MCNC: 9.3 MG/DL (ref 8.5–10.5)
CHLORIDE SERPL-SCNC: 106 MMOL/L (ref 96–112)
CO2 SERPL-SCNC: 22 MMOL/L (ref 20–33)
CREAT SERPL-MCNC: 0.88 MG/DL (ref 0.5–1.4)
EKG IMPRESSION: NORMAL
ERYTHROCYTE [DISTWIDTH] IN BLOOD BY AUTOMATED COUNT: 56.4 FL (ref 35.9–50)
GLUCOSE SERPL-MCNC: 57 MG/DL (ref 65–99)
HCT VFR BLD AUTO: 40.9 % (ref 37–47)
HGB BLD-MCNC: 12.7 G/DL (ref 12–16)
MCH RBC QN AUTO: 25.8 PG (ref 27–33)
MCHC RBC AUTO-ENTMCNC: 31.1 G/DL (ref 33.6–35)
MCV RBC AUTO: 83.1 FL (ref 81.4–97.8)
PLATELET # BLD AUTO: 398 K/UL (ref 164–446)
PMV BLD AUTO: 9.3 FL (ref 9–12.9)
POTASSIUM SERPL-SCNC: 3.9 MMOL/L (ref 3.6–5.5)
RBC # BLD AUTO: 4.92 M/UL (ref 4.2–5.4)
SODIUM SERPL-SCNC: 142 MMOL/L (ref 135–145)
WBC # BLD AUTO: 12.4 K/UL (ref 4.8–10.8)

## 2021-07-15 PROCEDURE — 93010 ELECTROCARDIOGRAM REPORT: CPT | Performed by: INTERNAL MEDICINE

## 2021-07-15 PROCEDURE — 85027 COMPLETE CBC AUTOMATED: CPT

## 2021-07-15 PROCEDURE — 80048 BASIC METABOLIC PNL TOTAL CA: CPT

## 2021-07-15 PROCEDURE — 93005 ELECTROCARDIOGRAM TRACING: CPT

## 2021-07-15 PROCEDURE — 36415 COLL VENOUS BLD VENIPUNCTURE: CPT

## 2021-07-15 NOTE — OR NURSING
COVID-19 Pre-surgery screenin. Do you have an undiagnosed respiratory illness or symptoms such as coughing or sneezing? No (Yes/No)      2. Do you have an unexplained fever greater than 100.4 degrees Fahrenheit or 38 degrees Celsius?     No     3. Have you had direct exposure to a patient who tested positive for Covid-19?    No     4. Have you had any loss of your sense of taste or smell? Have you had N/V or sore throat? No    Patient has been informed of visitor policy and asked to wear a mask upon entering the hospital   Yes

## 2021-07-15 NOTE — PROGRESS NOTES
COVID-19 Pre-surgery screening:    Patient did not answer. Left message with callback number.

## 2021-07-16 ENCOUNTER — ANESTHESIA (OUTPATIENT)
Dept: SURGERY | Facility: MEDICAL CENTER | Age: 61
End: 2021-07-16
Payer: COMMERCIAL

## 2021-07-16 ENCOUNTER — HOSPITAL ENCOUNTER (OUTPATIENT)
Facility: MEDICAL CENTER | Age: 61
End: 2021-07-16
Attending: SURGERY | Admitting: SURGERY
Payer: COMMERCIAL

## 2021-07-16 ENCOUNTER — ANESTHESIA EVENT (OUTPATIENT)
Dept: SURGERY | Facility: MEDICAL CENTER | Age: 61
End: 2021-07-16
Payer: COMMERCIAL

## 2021-07-16 VITALS
OXYGEN SATURATION: 96 % | BODY MASS INDEX: 37.03 KG/M2 | RESPIRATION RATE: 16 BRPM | HEIGHT: 63 IN | WEIGHT: 209 LBS | TEMPERATURE: 97 F | HEART RATE: 79 BPM | DIASTOLIC BLOOD PRESSURE: 81 MMHG | SYSTOLIC BLOOD PRESSURE: 129 MMHG

## 2021-07-16 DIAGNOSIS — G89.18 POSTOPERATIVE PAIN: ICD-10-CM

## 2021-07-16 LAB — GLUCOSE BLD-MCNC: 105 MG/DL (ref 65–99)

## 2021-07-16 PROCEDURE — 700102 HCHG RX REV CODE 250 W/ 637 OVERRIDE(OP): Performed by: ANESTHESIOLOGY

## 2021-07-16 PROCEDURE — 160048 HCHG OR STATISTICAL LEVEL 1-5: Performed by: SURGERY

## 2021-07-16 PROCEDURE — 160009 HCHG ANES TIME/MIN: Performed by: SURGERY

## 2021-07-16 PROCEDURE — 501838 HCHG SUTURE GENERAL: Performed by: SURGERY

## 2021-07-16 PROCEDURE — 700101 HCHG RX REV CODE 250: Performed by: SURGERY

## 2021-07-16 PROCEDURE — 700111 HCHG RX REV CODE 636 W/ 250 OVERRIDE (IP): Performed by: ANESTHESIOLOGY

## 2021-07-16 PROCEDURE — 160036 HCHG PACU - EA ADDL 30 MINS PHASE I: Performed by: SURGERY

## 2021-07-16 PROCEDURE — 160002 HCHG RECOVERY MINUTES (STAT): Performed by: SURGERY

## 2021-07-16 PROCEDURE — 160029 HCHG SURGERY MINUTES - 1ST 30 MINS LEVEL 4: Performed by: SURGERY

## 2021-07-16 PROCEDURE — 160041 HCHG SURGERY MINUTES - EA ADDL 1 MIN LEVEL 4: Performed by: SURGERY

## 2021-07-16 PROCEDURE — A9270 NON-COVERED ITEM OR SERVICE: HCPCS | Performed by: ANESTHESIOLOGY

## 2021-07-16 PROCEDURE — 160025 RECOVERY II MINUTES (STATS): Performed by: SURGERY

## 2021-07-16 PROCEDURE — 82962 GLUCOSE BLOOD TEST: CPT

## 2021-07-16 PROCEDURE — 160035 HCHG PACU - 1ST 60 MINS PHASE I: Performed by: SURGERY

## 2021-07-16 PROCEDURE — 160046 HCHG PACU - 1ST 60 MINS PHASE II: Performed by: SURGERY

## 2021-07-16 PROCEDURE — 700105 HCHG RX REV CODE 258: Performed by: SURGERY

## 2021-07-16 RX ORDER — CEFAZOLIN SODIUM 1 G/3ML
INJECTION, POWDER, FOR SOLUTION INTRAMUSCULAR; INTRAVENOUS PRN
Status: DISCONTINUED | OUTPATIENT
Start: 2021-07-16 | End: 2021-07-16 | Stop reason: SURG

## 2021-07-16 RX ORDER — DEXAMETHASONE SODIUM PHOSPHATE 4 MG/ML
INJECTION, SOLUTION INTRA-ARTICULAR; INTRALESIONAL; INTRAMUSCULAR; INTRAVENOUS; SOFT TISSUE PRN
Status: DISCONTINUED | OUTPATIENT
Start: 2021-07-16 | End: 2021-07-16 | Stop reason: SURG

## 2021-07-16 RX ORDER — OXYCODONE HCL 5 MG/5 ML
10 SOLUTION, ORAL ORAL
Status: COMPLETED | OUTPATIENT
Start: 2021-07-16 | End: 2021-07-16

## 2021-07-16 RX ORDER — HYDROCODONE BITARTRATE AND ACETAMINOPHEN 5; 325 MG/1; MG/1
1 TABLET ORAL EVERY 4 HOURS PRN
Qty: 30 TABLET | Refills: 0 | Status: SHIPPED | OUTPATIENT
Start: 2021-07-16 | End: 2021-07-21

## 2021-07-16 RX ORDER — ONDANSETRON 2 MG/ML
INJECTION INTRAMUSCULAR; INTRAVENOUS PRN
Status: DISCONTINUED | OUTPATIENT
Start: 2021-07-16 | End: 2021-07-16 | Stop reason: SURG

## 2021-07-16 RX ORDER — DIPHENHYDRAMINE HYDROCHLORIDE 50 MG/ML
12.5 INJECTION INTRAMUSCULAR; INTRAVENOUS
Status: DISCONTINUED | OUTPATIENT
Start: 2021-07-16 | End: 2021-07-16 | Stop reason: HOSPADM

## 2021-07-16 RX ORDER — BUPIVACAINE HYDROCHLORIDE AND EPINEPHRINE 5; 5 MG/ML; UG/ML
INJECTION, SOLUTION EPIDURAL; INTRACAUDAL; PERINEURAL
Status: DISCONTINUED
Start: 2021-07-16 | End: 2021-07-16 | Stop reason: HOSPADM

## 2021-07-16 RX ORDER — HALOPERIDOL 5 MG/ML
1 INJECTION INTRAMUSCULAR
Status: DISCONTINUED | OUTPATIENT
Start: 2021-07-16 | End: 2021-07-16 | Stop reason: HOSPADM

## 2021-07-16 RX ORDER — MIDAZOLAM HYDROCHLORIDE 1 MG/ML
INJECTION INTRAMUSCULAR; INTRAVENOUS PRN
Status: DISCONTINUED | OUTPATIENT
Start: 2021-07-16 | End: 2021-07-16 | Stop reason: SURG

## 2021-07-16 RX ORDER — SODIUM CHLORIDE, SODIUM LACTATE, POTASSIUM CHLORIDE, CALCIUM CHLORIDE 600; 310; 30; 20 MG/100ML; MG/100ML; MG/100ML; MG/100ML
INJECTION, SOLUTION INTRAVENOUS CONTINUOUS
Status: ACTIVE | OUTPATIENT
Start: 2021-07-16 | End: 2021-07-16

## 2021-07-16 RX ORDER — ONDANSETRON 2 MG/ML
4 INJECTION INTRAMUSCULAR; INTRAVENOUS
Status: DISCONTINUED | OUTPATIENT
Start: 2021-07-16 | End: 2021-07-16 | Stop reason: HOSPADM

## 2021-07-16 RX ORDER — SODIUM CHLORIDE, SODIUM LACTATE, POTASSIUM CHLORIDE, CALCIUM CHLORIDE 600; 310; 30; 20 MG/100ML; MG/100ML; MG/100ML; MG/100ML
INJECTION, SOLUTION INTRAVENOUS CONTINUOUS
Status: DISCONTINUED | OUTPATIENT
Start: 2021-07-16 | End: 2021-07-16 | Stop reason: HOSPADM

## 2021-07-16 RX ORDER — OXYCODONE HCL 5 MG/5 ML
5 SOLUTION, ORAL ORAL
Status: COMPLETED | OUTPATIENT
Start: 2021-07-16 | End: 2021-07-16

## 2021-07-16 RX ADMIN — MIDAZOLAM HYDROCHLORIDE 2 MG: 1 INJECTION, SOLUTION INTRAMUSCULAR; INTRAVENOUS at 07:46

## 2021-07-16 RX ADMIN — ONDANSETRON 4 MG: 2 INJECTION INTRAMUSCULAR; INTRAVENOUS at 08:00

## 2021-07-16 RX ADMIN — FENTANYL CITRATE 100 MCG: 50 INJECTION, SOLUTION INTRAMUSCULAR; INTRAVENOUS at 07:46

## 2021-07-16 RX ADMIN — SODIUM CHLORIDE, POTASSIUM CHLORIDE, SODIUM LACTATE AND CALCIUM CHLORIDE: 600; 310; 30; 20 INJECTION, SOLUTION INTRAVENOUS at 07:22

## 2021-07-16 RX ADMIN — CEFAZOLIN 2 G: 330 INJECTION, POWDER, FOR SOLUTION INTRAMUSCULAR; INTRAVENOUS at 07:53

## 2021-07-16 RX ADMIN — DEXAMETHASONE SODIUM PHOSPHATE 8 MG: 4 INJECTION, SOLUTION INTRA-ARTICULAR; INTRALESIONAL; INTRAMUSCULAR; INTRAVENOUS; SOFT TISSUE at 08:00

## 2021-07-16 RX ADMIN — PROPOFOL 200 MG: 10 INJECTION, EMULSION INTRAVENOUS at 07:46

## 2021-07-16 RX ADMIN — OXYCODONE HYDROCHLORIDE 10 MG: 5 SOLUTION ORAL at 10:46

## 2021-07-16 RX ADMIN — FENTANYL CITRATE 50 MCG: 50 INJECTION, SOLUTION INTRAMUSCULAR; INTRAVENOUS at 10:46

## 2021-07-16 ASSESSMENT — PAIN DESCRIPTION - PAIN TYPE
TYPE: SURGICAL PAIN

## 2021-07-16 ASSESSMENT — PAIN SCALES - GENERAL: PAIN_LEVEL: 1

## 2021-07-16 NOTE — ANESTHESIA PROCEDURE NOTES
Airway    Date/Time: 7/16/2021 7:39 AM  Performed by: Casa Ramesh M.D.  Authorized by: Casa Ramesh M.D.     Location:  OR  Urgency:  Elective  Difficult Airway: No    Indications for Airway Management:  Anesthesia      Spontaneous Ventilation: present    Sedation Level:  Deep  Preoxygenated: Yes    Patient Position:  Sniffing  MILS Maintained Throughout: No    Mask Difficulty Assessment:  0 - not attempted  Final Airway Type:  Supraglottic airway  Final Supraglottic Airway:  Standard LMA    SGA Size:  4  Number of Attempts at Approach:  1

## 2021-07-16 NOTE — ANESTHESIA TIME REPORT
Anesthesia Start and Stop Event Times     Date Time Event    7/16/2021 0718 Ready for Procedure     0736 Anesthesia Start     0957 Anesthesia Stop        Responsible Staff  07/16/21    Name Role Begin End    Casa Ramesh M.D. Anesth 0736 0957        Preop Diagnosis (Free Text):  Pre-op Diagnosis     BILATERAL LATERAL BREAST CONTOUR IRREGULARITY        Preop Diagnosis (Codes):    Post op Diagnosis  Breast cancer (HCC)      Premium Reason  Non-Premium    Comments:

## 2021-07-16 NOTE — OR SURGEON
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 - Wound Class: Clean    Surgeon(s):  Guy Starks M.D.    Anesthesiologist/Type of Anesthesia:  Anesthesiologist: Casa Ramesh M.D./General    Surgical Staff:  Circulator: DAVIDA Davidson Circulator: DAVIDA Healy Scrub: Claudette Gilman  Scrub Person: Juliana Knox    Specimens removed if any:  * No specimens in log *    Estimated Blood Loss: 20cc    Findings:     Complications: none        7/16/2021 10:04 AM Guy Starks M.D.

## 2021-07-16 NOTE — PROGRESS NOTES
1020- Report received from STAN Gracia. Patient is resting, vital signs are stable. Patient is on 2L mask, unlabored breathing. Dressing is clean, dry and intact.     *1035**- Report given to STAN Gracia.

## 2021-07-16 NOTE — OR NURSING
0975  RECEIVED PATIENT FROM OR.  REPORT FROM DR. MCCLOUD.  NO AIRWAY IN PLACE.  RESPIRATIONS ARE EVEN AND UNLABORED.  ACE WRAP TO CHEST IS CDI.       1020  REPORT TO DILMA PIERCE.     1035  RECEIVED REPORT FROM DILMA PIERCE.  RESUMED CARE OF PATIENT.      1046  MEDICATED WITH IV AND PO PAIN MEDICATION FOR C/O BILATERAL BREAST PAIN 7.    1100  PHASE 2.    1130  UP TO THE BATHROOM.    1155  DISCHARGED.  DISCHARGE INSTRUCTIONS GIVEN TO PATIENT AND HER FAMILY.  A VERBAL UNDERSTANDING OF ALL INSTRUCTIONS WAS STATED.  PATIENT TAKING PO, VOIDING AND AMBULATING WITHOUT DIFFICULTY.  PATIENT STATES SHE IS READY TO GO HOME.

## 2021-07-16 NOTE — DISCHARGE INSTRUCTIONS
ACTIVITY: Rest and take it easy for the first 24 hours.  A responsible adult is recommended to remain with you during that time.  It is normal to feel sleepy.  We encourage you to not do anything that requires balance, judgment or coordination.    MILD FLU-LIKE SYMPTOMS ARE NORMAL. YOU MAY EXPERIENCE GENERALIZED MUSCLE ACHES, THROAT IRRITATION, HEADACHE AND/OR SOME NAUSEA.    FOR 24 HOURS DO NOT:  Drive, operate machinery or run household appliances.  Drink beer or alcoholic beverages.   Make important decisions or sign legal documents.    SPECIAL INSTRUCTIONS:     DIET: To avoid nausea, slowly advance diet as tolerated, avoiding spicy or greasy foods for the first day.  Add more substantial food to your diet according to your physician's instructions.  Babies can be fed formula or breast milk as soon as they are hungry.  INCREASE FLUIDS AND FIBER TO AVOID CONSTIPATION.    SURGICAL DRESSING/BATHING: *LEAVE ACE WRAP ON FOR 3-4 DAYS  KEEP ACE WRAP CLEAN AND DRY    FOLLOW-UP APPOINTMENT:  A follow-up appointment should be arranged with your doctor in *FOLLOW UP WITH DR. HOYT**; call to schedule.    You should CALL YOUR PHYSICIAN if you develop:  Fever greater than 101 degrees F.  Pain not relieved by medication, or persistent nausea or vomiting.  Excessive bleeding (blood soaking through dressing) or unexpected drainage from the wound.  Extreme redness or swelling around the incision site, drainage of pus or foul smelling drainage.  Inability to urinate or empty your bladder within 8 hours.  Problems with breathing or chest pain.    You should call 911 if you develop problems with breathing or chest pain.  If you are unable to contact your doctor or surgical center, you should go to the nearest emergency room or urgent care center.  Physician's telephone #: *DR. HOYT [phone]**    If any questions arise, call your doctor.  If your doctor is not available, please feel free to call the Surgical Center at  [phone]. The Contact Center is open Monday through Friday 7AM to 5PM and may speak to a nurse at [phone], or toll free at [phone].     A registered nurse may call you a few days after your surgery to see how you are doing after your procedure.    MEDICATIONS: Resume taking daily medication.  Take prescribed pain medication with food.  If no medication is prescribed, you may take non-aspirin pain medication if needed.  PAIN MEDICATION CAN BE VERY CONSTIPATING.  Take a stool softener or laxative such as senokot, pericolace, or milk of magnesia if needed.    Prescription given for *NORCO**.  Last pain medication given at *11:00 AM**.    If your physician has prescribed pain medication that includes Acetaminophen (Tylenol), do not take additional Acetaminophen (Tylenol) while taking the prescribed medication.    Depression / Suicide Risk    As you are discharged from this [facility] facility, it is important to learn how to keep safe from harming yourself.    Recognize the warning signs:  · Abrupt changes in personality, positive or negative- including increase in energy   · Giving away possessions  · Change in eating patterns- significant weight changes-  positive or negative  · Change in sleeping patterns- unable to sleep or sleeping all the time   · Unwillingness or inability to communicate  · Depression  · Unusual sadness, discouragement and loneliness  · Talk of wanting to die  · Neglect of personal appearance   · Rebelliousness- reckless behavior  · Withdrawal from people/activities they love  · Confusion- inability to concentrate     If you or a loved one observes any of these behaviors or has concerns about self-harm, here's what you can do:  · Talk about it- your feelings and reasons for harming yourself  · Remove any means that you might use to hurt yourself (examples: pills, rope, extension cords, firearm)  · Get professional help from the community (Mental Health, Substance Abuse,  psychological counseling)  · Do not be alone:Call your Safe Contact- someone whom you trust who will be there for you.  · Call your local CRISIS HOTLINE 398-5062 or 063-553-7813  · Call your local Children's Mobile Crisis Response Team [place] (975) 720-6915 or www.[employer]  · Call the toll free National Suicide Prevention Hotlines   · National Suicide Prevention Lifeline 577-549-REXT (6769)  · National Hope Line Network 800-SUICIDE (056-3660)

## 2021-07-16 NOTE — ANESTHESIA PREPROCEDURE EVALUATION
Relevant Problems   ANESTHESIA   (positive) Sleep apnea      NEURO   (positive) History of breast cancer      CARDIAC   (positive) Hypertension      GI   (positive) GERD (gastroesophageal reflux disease)       Physical Exam    Airway   Mallampati: II  TM distance: >3 FB  Neck ROM: full       Cardiovascular   Rhythm: regular  Rate: normal     Dental - normal exam           Pulmonary - normal exam     Abdominal - normal exam     Neurological - normal exam                 Anesthesia Plan    ASA 2       Plan - general       Airway plan will be LMA        Plan Factors:   Patient was not previously instructed to abstain from smoking on day of procedure.  Patient did not smoke on day of procedure.      Induction: intravenous          Informed Consent:    Anesthetic plan and risks discussed with patient.    Use of blood products discussed with: patient whom.

## 2021-07-16 NOTE — ANESTHESIA POSTPROCEDURE EVALUATION
Patient: Melanie Leyva    Procedure Summary     Date: 07/16/21 Room / Location: [facility] ROOM 25 / SURGERY SAME DAY [facility]    Anesthesia Start: 0736 Anesthesia Stop: 0957    Procedure: REVISION, SCAR - OF BREAST INCISION. (Bilateral Breast) Diagnosis: (BILATERAL LATERAL BREAST CONTOUR IRREGULARITY)    Surgeons: Guy Starks M.D. Responsible Provider: Casa Ramesh M.D.    Anesthesia Type: general ASA Status: 2          Final Anesthesia Type: general  Last vitals  BP   Blood Pressure: 133/84    Temp   36.2 °C (97.2 °F)    Pulse   85   Resp   16    SpO2   94 %      Anesthesia Post Evaluation    Patient location during evaluation: PACU  Patient participation: complete - patient participated  Level of consciousness: awake and alert  Pain score: 1    Airway patency: patent  Anesthetic complications: no  Cardiovascular status: adequate  Respiratory status: acceptable  Hydration status: acceptable    PONV: none          No complications documented.     Nurse Pain Score: 0 (NPRS)

## 2021-07-18 NOTE — OP REPORT
DATE OF SERVICE:  07/16/2021     PREOPERATIVE DIAGNOSIS:  Deformity of chest wall after bilateral total   mastectomies for breast cancer.     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     PROCEDURE PERFORMED:  Revision of bilateral breast incisions via complex   closure, 54 cm total.     SURGEON:  Guy Starks MD     ASSISTANT:  None.     ANESTHESIOLOGIST:  Casa Ramesh MD     ANESTHESIA TYPE:  General.     SPECIMENS:  None.     DRAINS:  None.     ESTIMATED BLOOD LOSS:  20 mL.     COMPLICATIONS:  None.     BRIEF HISTORY:  This is a 60-year-old female who was diagnosed with breast   cancer in 2019.  She underwent bilateral total mastectomies, but had   postoperative wound healing issues at both sites, presumably secondary to an   immunodeficiency disorder that she has.  After having wound VAC therapy, her   chest wound is finally closed.  She then saw me in consultation for an attempt   at reconstruction.  I placed bilateral tissue expanders, but unfortunately   her reconstruction was not successful and the expanders were removed.  She   healed well from this, but has significant areas of excessive redundant skin   and subcutaneous fat lateral to the breast incisions bilaterally as well as   centrally on the left breast.  This is making it very difficult for her to fit   into a bra or clothing.  We discussed excising these areas and closing them   to make a more smooth, flush chest wall contour to make it easier for her to   wear clothing.  Risks, benefits and alternatives of the procedure were   explained to her, risks including bleeding, infection, anesthesia risks, wound   healing issues, especially given her history, seroma or hematoma formation,   need for revisional surgery as well as poor cosmetic outcome.  All of her   questions were answered.     PROCEDURE IN DETAIL:  After informed consent was obtained, the patient was   marked in the  preoperative holding area, marking out all of the areas of skin   redundancy.  She was then brought to the operating room and placed on the   table in supine position.  After induction of general anesthesia, her chest   was prepped and draped in the usual sterile fashion.  A timeout was called   correctly identifying the patient and procedure.  I initially began by placing   her in the right side up lateral position.  The previously marked incision   line was anesthetized with 1% lidocaine with epinephrine.  I then incised   through the skin and dissected down to the chest wall with electrocautery.  I   dissected all of the redundant skin and subcutaneous fat off of the abdominal   wall.  The area was then irrigated and hemostasis obtained.  I performed   undermining on either side of the incision to allow for a tension-free   closure.  The superficial fascia was closed with 2-0 Vicryl sutures and the   skin closed with 3-0 Monocryl deep dermal, 4-0 Monocryl subcuticular sutures.    The patient was then placed in the left side up lateral position, reprepped   and draped and the mirror procedure performed on the lateral tissue excess.    She additionally had an area on the left anterior chest wall that was marked   out and excised and closed in a similar fashion.  The total closure size on   the right measured 18 cm and on the left, the lateral closure measured 25 cm   and the anterior closure 11 cm.  All the incision lines were dressed with   Steri-Strips, ABD pads, and she was placed in a light compressive elastic   wrap.  The patient tolerated the procedure well and there were no   complications.  Sponge and needle count were correct at the end of the case.    She was extubated and taken to the recovery room in good condition.        ______________________________  OMER HOYT MD PSM/PRECIOUS    DD:  07/18/2021 14:53  DT:  07/18/2021 15:36    Job#:  [id number]

## 2021-08-16 ENCOUNTER — HOSPITAL ENCOUNTER (OUTPATIENT)
Dept: RADIOLOGY | Facility: MEDICAL CENTER | Age: 61
End: 2021-08-16
Attending: INTERNAL MEDICINE
Payer: COMMERCIAL

## 2021-08-16 DIAGNOSIS — C50.812 MALIGNANT NEOPLASM OF OVERLAPPING SITES OF LEFT FEMALE BREAST, UNSPECIFIED ESTROGEN RECEPTOR STATUS (HCC): ICD-10-CM

## 2021-08-16 PROCEDURE — 77080 DXA BONE DENSITY AXIAL: CPT

## 2022-05-05 ENCOUNTER — HOSPITAL ENCOUNTER (OUTPATIENT)
Dept: RADIOLOGY | Facility: MEDICAL CENTER | Age: 62
End: 2022-05-05
Attending: NURSE PRACTITIONER
Payer: COMMERCIAL

## 2022-05-05 DIAGNOSIS — M48.56XA COLLAPSE OF LUMBAR VERTEBRA, INITIAL ENCOUNTER (HCC): ICD-10-CM

## 2022-05-05 PROCEDURE — 72110 X-RAY EXAM L-2 SPINE 4/>VWS: CPT

## 2023-03-06 NOTE — CARE PLAN
Problem: Knowledge Deficit  Goal: Knowledge of disease process/condition, treatment plan, diagnostic tests, and medications will improve  Plan of care for shift discussed with pt. Pt requesting to remove dressing and shower and then complete wound packing.    Problem: Pain Management  Goal: Pain level will decrease to patient's comfort goal  PCA dc'd and percocet started       English

## (undated) DEVICE — SUTURE GENERAL

## (undated) DEVICE — DRAIN J-VAC 10MM FLAT - (10/CA)

## (undated) DEVICE — SUTURE 2-0 ETHILON FS - (36/BX) 18 INCH

## (undated) DEVICE — KIT  I.V. START (100EA/CA)

## (undated) DEVICE — DRAPE LARGE 3 QUARTER - (20/CA)

## (undated) DEVICE — ELECTRODE DUAL RETURN W/ CORD - (50/PK)

## (undated) DEVICE — SLEEVE, VASO, THIGH, MED

## (undated) DEVICE — ELECTRODE 850 FOAM ADHESIVE - HYDROGEL RADIOTRNSPRNT (50/PK)

## (undated) DEVICE — TUBING CLEARLINK DUO-VENT - C-FLO (48EA/CA)

## (undated) DEVICE — LACTATED RINGERS INJ 1000 ML - (14EA/CA 60CA/PF)

## (undated) DEVICE — GLOVE BIOGEL SZ 8 SURGICAL PF LTX - (50PR/BX 4BX/CA)

## (undated) DEVICE — SUTURE 4-0 MONOCRYL PLUS PS-2 - 27 INCH (36/BX)

## (undated) DEVICE — CLIP MED INTNL HRZN TI ESCP - (25/BX)

## (undated) DEVICE — WATER IRRIGATION STERILE 1000ML (12EA/CA)

## (undated) DEVICE — SUTURE 3-0 MONOCRYL PLUS PS-1 - 27 INCH (36/BX)

## (undated) DEVICE — CHLORAPREP 26 ML APPLICATOR - ORANGE TINT(25/CA)

## (undated) DEVICE — NEPTUNE 4 PORT MANIFOLD - (20/PK)

## (undated) DEVICE — DRAPE STRLE REG TOWEL 18X24 - (10/BX 4BX/CA)"

## (undated) DEVICE — KIT ANESTHESIA W/CIRCUIT & 3/LT BAG W/FILTER (20EA/CA)

## (undated) DEVICE — MASK ANESTHESIA ADULT  - (100/CA)

## (undated) DEVICE — COVER LIGHT HANDLE ALC PLUS DISP (18EA/BX)

## (undated) DEVICE — GLOVE BIOGEL PI ORTHO SZ 7.5 PF LF (40PR/BX)

## (undated) DEVICE — GLOVE SZ 6.5 BIOGEL PI MICRO - PF LF (50PR/BX)

## (undated) DEVICE — CANISTER SUCTION 3000ML MECHANICAL FILTER AUTO SHUTOFF MEDI-VAC NONSTERILE LF DISP  (40EA/CA)

## (undated) DEVICE — SET LEADWIRE 5 LEAD BEDSIDE DISPOSABLE ECG (1SET OF 5/EA)

## (undated) DEVICE — DRESSING ABDOMINAL PAD STERILE 8 X 10" (360EA/CA)"

## (undated) DEVICE — HANDPIECE 10FT INTPLS SCT PLS IRRIGATION HAND CONTROL SET (6/PK)

## (undated) DEVICE — MASK, LARYNGEAL AIRWAY #4

## (undated) DEVICE — STAPLER SKIN DISP - (6/BX 10BX/CA) VISISTAT

## (undated) DEVICE — SYRINGE DISP. 60 CC LL - (30/BX, 12BX/CA)**WHEN THESE ARE GONE ORDER #500206**

## (undated) DEVICE — SYRINGE 10 ML CONTROL LL (25EA/BX 4BX/CA)

## (undated) DEVICE — SENSOR SPO2 NEO LNCS ADHESIVE (20/BX) SEE USER NOTES

## (undated) DEVICE — SET EXTENSION WITH 2 PORTS (48EA/CA) ***PART #2C8610 IS A SUBSTITUTE*****

## (undated) DEVICE — SUTURE 3-0 ETHILON FS-1 - (36/BX) 30 INCH

## (undated) DEVICE — PEN SKIN MARKER W/RULER - (50EA/BX)

## (undated) DEVICE — CATHETER IV 20 GA X 1-1/4 ---SURG.& SDS ONLY--- (50EA/BX)

## (undated) DEVICE — HEAD HOLDER JUNIOR/ADULT

## (undated) DEVICE — SUCTION INSTRUMENT YANKAUER BULBOUS TIP W/O VENT (50EA/CA)

## (undated) DEVICE — BLADE SURGICAL #15 - (50/BX 3BX/CA)

## (undated) DEVICE — RESERVOIR SUCTION 100 CC - SILICONE (20EA/CA)

## (undated) DEVICE — PACK MINOR BASIN - (2EA/CA)

## (undated) DEVICE — MANIFOLD NEPTUNE 1 PORT (20/PK)

## (undated) DEVICE — SODIUM CHL IRRIGATION 0.9% 1000ML (12EA/CA)

## (undated) DEVICE — CANISTER SUCTION RIGID RED 1500CC (40EA/CA)

## (undated) DEVICE — GLOVE BIOGEL INDICATOR SZ 8 SURGICAL PF LTX - (50/BX 4BX/CA)

## (undated) DEVICE — GLOVE BIOGEL PI INDICATOR SZ 8.0 SURGICAL PF LF -(50/BX 4BX/CA)

## (undated) DEVICE — DRAPE CHEST/BREAST - (12EA/CA)

## (undated) DEVICE — GOWN WARMING STANDARD FLEX - (30/CA)

## (undated) DEVICE — TOWELS CLOTH SURGICAL - (4/PK 20PK/CA)

## (undated) DEVICE — BANDAGE ELASTIC STERILE MATRIX 6 X 10 (20EA/CA)

## (undated) DEVICE — DETERGENT RENUZYME PLUS 10 OZ PACKET (50/BX)

## (undated) DEVICE — BLADE SURGICAL #10 - (50/BX)

## (undated) DEVICE — PROTECTOR ULNA NERVE - (36PR/CA)

## (undated) DEVICE — GLOVE, LITE (PAIR)

## (undated) DEVICE — SUTURE 4-0 VICRYL PLUS FS-2 - 27 INCH (36/BX)

## (undated) DEVICE — CLIP SM INTNL HRZN TI ESCP LGT - (24EA/PK 25PK/BX)

## (undated) DEVICE — GLOVE BIOGEL SZ 6 PF LATEX - (50EA/BX 4BX/CA)

## (undated) DEVICE — BANDAGE ROLL STERILE BULKEE 4.5 IN X 4 YD (100EA/CA)

## (undated) DEVICE — SPONGE XRAY 8X4 STERL. 12PL - (10EA/TY 80TY/CA)

## (undated) DEVICE — DRAPE LAPAROTOMY T SHEET - (12EA/CA)

## (undated) DEVICE — SPONGE GAUZESTER 4 X 4 4PLY - (128PK/CA)

## (undated) DEVICE — Device

## (undated) DEVICE — SUTURE PLASTIC

## (undated) DEVICE — SPONGE DRAIN 4 X 4IN 6-PLY - (2/PK25PK/BX12BX/CS)

## (undated) DEVICE — GLOVE BIOGEL PI INDICATOR SZ 6.5 SURGICAL PF LF - (50/BX 4BX/CA)

## (undated) DEVICE — TUBE CONNECTING SUCTION - CLEAR PLASTIC STERILE 72 IN (50EA/CA)

## (undated) DEVICE — SHEET TRANSVERSE LAP - (12EA/CA)

## (undated) DEVICE — SUTURE 3-0 VICRYL PLUS - 12 X 18 INCH (12/BX)

## (undated) DEVICE — MAT PATIENT POSITIONING PREVALON (10EA/CA)

## (undated) DEVICE — SHEAR HS FOCUS 9CM CVD - (6/BX)

## (undated) DEVICE — SUTURE 2-0 ETHIBOND GREEN CT-2 TAPER (36PK/BX)

## (undated) DEVICE — GLOVE BIOGEL PI INDICATOR SZ 7.0 SURGICAL PF LF - (50/BX 4BX/CA)

## (undated) DEVICE — GOWN SURGEONS X-LARGE - DISP. (30/CA)

## (undated) DEVICE — NEEDLE NON SAFETY HYPO 22 GA X 1 1/2 IN (100/BX)

## (undated) DEVICE — SUTURE 3-0 MONOCRYL PLUS PS-2 - (12/BX)

## (undated) DEVICE — GLOVE BIOGEL INDICATOR SZ 8.5 SURGICAL PF LTX - (50/BX 4BX/CA)

## (undated) DEVICE — GLOVE BIOGEL SZ 8.5 SURGICAL PF LTX - (50PR/BX 4BX/CA)

## (undated) DEVICE — CLOSURE SKIN STRIP 1/2 X 4 IN - (STERI STRIP) (50/BX 4BX/CA)

## (undated) DEVICE — GLOVE SZ 7.5 BIOGEL PI MICRO - PF LF (50PR/BX)

## (undated) DEVICE — BOVIE  BLADE 6 EXTENDED - (50/PK)

## (undated) DEVICE — STOCKINET TUBULAR 4IN STERILE - 4 X 36YDS (25/CA)

## (undated) DEVICE — GUIDE TRACHE TUBE INTUBATING STYLET 5.0-10.0MM 14FR (20EA/PK)

## (undated) DEVICE — PACK MAJOR BASIN - (2EA/CA)

## (undated) DEVICE — SUTURE 3-0 VICRYL PLUS SH - 8X 18 INCH (12/BX)

## (undated) DEVICE — SLEEVE VASO CALF MED - (10PR/CA)

## (undated) DEVICE — SET MULTI-ADD FLUID TRANSFER 42 INCH - (50/CA)THIS IS A CUSTOM MADE ITEM 4 TO 6 WEEK LEAD TIME

## (undated) DEVICE — SUTURE 2-0 VICRYL PLUS CT-2 - 27 INCH (36/BX)

## (undated) DEVICE — TOWEL STOP TIMEOUT SAFETY FLAG (40EA/CA)

## (undated) DEVICE — TUBE E-T HI-LO CUFF 7.0MM (10EA/PK)

## (undated) DEVICE — PAD LAP STERILE 18 X 18 - (5/PK 40PK/CA)

## (undated) DEVICE — STERI STRIP COMPOUND BENZOIN - TINCTURE 0.6ML WITH APPLICATOR (40EA/BX)

## (undated) DEVICE — SUTURE 2-0 VICRYL PLUS SH - 8 X 18 INCH (12/BX)

## (undated) DEVICE — COVER PROBE STERILE CONE (12EA/CA)

## (undated) DEVICE — SWAB ANAEROBIC SPEC.COLLECTOR - (25/PK 4PK/CA 100EA/CA)